# Patient Record
Sex: FEMALE | Race: WHITE | NOT HISPANIC OR LATINO | Employment: OTHER | URBAN - METROPOLITAN AREA
[De-identification: names, ages, dates, MRNs, and addresses within clinical notes are randomized per-mention and may not be internally consistent; named-entity substitution may affect disease eponyms.]

---

## 2017-01-12 ENCOUNTER — TRANSFERRED RECORDS (OUTPATIENT)
Dept: HEALTH INFORMATION MANAGEMENT | Facility: CLINIC | Age: 62
End: 2017-01-12

## 2017-02-28 ENCOUNTER — OFFICE VISIT (OUTPATIENT)
Dept: DERMATOLOGY | Facility: CLINIC | Age: 62
End: 2017-02-28

## 2017-02-28 VITALS — DIASTOLIC BLOOD PRESSURE: 79 MMHG | HEART RATE: 85 BPM | SYSTOLIC BLOOD PRESSURE: 138 MMHG

## 2017-02-28 DIAGNOSIS — L66.10 LICHEN PLANO-PILARIS: Primary | ICD-10-CM

## 2017-02-28 DIAGNOSIS — L30.9 DERMATITIS: ICD-10-CM

## 2017-02-28 DIAGNOSIS — L43.9 LICHEN PLANUS: ICD-10-CM

## 2017-02-28 DIAGNOSIS — L29.9 PRURITUS: ICD-10-CM

## 2017-02-28 LAB
ALBUMIN SERPL-MCNC: 3.7 G/DL (ref 3.4–5)
ALP SERPL-CCNC: 55 U/L (ref 40–150)
ALT SERPL W P-5'-P-CCNC: 28 U/L (ref 0–50)
ANION GAP SERPL CALCULATED.3IONS-SCNC: 6 MMOL/L (ref 3–14)
AST SERPL W P-5'-P-CCNC: 19 U/L (ref 0–45)
BASOPHILS # BLD AUTO: 0 10E9/L (ref 0–0.2)
BASOPHILS NFR BLD AUTO: 0.3 %
BILIRUB SERPL-MCNC: 0.2 MG/DL (ref 0.2–1.3)
BUN SERPL-MCNC: 13 MG/DL (ref 7–30)
CALCIUM SERPL-MCNC: 8.7 MG/DL (ref 8.5–10.1)
CHLORIDE SERPL-SCNC: 99 MMOL/L (ref 94–109)
CO2 SERPL-SCNC: 32 MMOL/L (ref 20–32)
CREAT SERPL-MCNC: 0.73 MG/DL (ref 0.52–1.04)
DIFFERENTIAL METHOD BLD: NORMAL
EOSINOPHIL # BLD AUTO: 0.1 10E9/L (ref 0–0.7)
EOSINOPHIL NFR BLD AUTO: 1 %
ERYTHROCYTE [DISTWIDTH] IN BLOOD BY AUTOMATED COUNT: 12.9 % (ref 10–15)
GFR SERPL CREATININE-BSD FRML MDRD: 82 ML/MIN/1.7M2
GLUCOSE SERPL-MCNC: 94 MG/DL (ref 70–99)
HCT VFR BLD AUTO: 39.7 % (ref 35–47)
HGB BLD-MCNC: 13.4 G/DL (ref 11.7–15.7)
IMM GRANULOCYTES # BLD: 0 10E9/L (ref 0–0.4)
IMM GRANULOCYTES NFR BLD: 0.1 %
LYMPHOCYTES # BLD AUTO: 2.3 10E9/L (ref 0.8–5.3)
LYMPHOCYTES NFR BLD AUTO: 33.6 %
MCH RBC QN AUTO: 32.1 PG (ref 26.5–33)
MCHC RBC AUTO-ENTMCNC: 33.8 G/DL (ref 31.5–36.5)
MCV RBC AUTO: 95 FL (ref 78–100)
MONOCYTES # BLD AUTO: 0.5 10E9/L (ref 0–1.3)
MONOCYTES NFR BLD AUTO: 6.8 %
NEUTROPHILS # BLD AUTO: 4 10E9/L (ref 1.6–8.3)
NEUTROPHILS NFR BLD AUTO: 58.2 %
NRBC # BLD AUTO: 0 10*3/UL
NRBC BLD AUTO-RTO: 0 /100
PLATELET # BLD AUTO: 265 10E9/L (ref 150–450)
POTASSIUM SERPL-SCNC: 3.8 MMOL/L (ref 3.4–5.3)
PROT SERPL-MCNC: 7 G/DL (ref 6.8–8.8)
RBC # BLD AUTO: 4.18 10E12/L (ref 3.8–5.2)
SODIUM SERPL-SCNC: 138 MMOL/L (ref 133–144)
WBC # BLD AUTO: 6.9 10E9/L (ref 4–11)

## 2017-02-28 RX ORDER — BETAMETHASONE DIPROPIONATE 0.5 MG/G
LOTION TOPICAL DAILY
COMMUNITY
End: 2017-09-18

## 2017-02-28 ASSESSMENT — PAIN SCALES - GENERAL: PAINLEVEL: NO PAIN (0)

## 2017-02-28 NOTE — PROGRESS NOTES
"[facility] Dermatology Note      Dermatology Problem List:  1. Lichen planopilaris    Encounter Date: Feb 28, 2017    CC:   Chief Complaint   Patient presents with     Derm Problem     Hair loss. First visit with Dr. Quinn.  Referred by Dr. Arreola at [facility].         History of Present Illness:  Ms. Chitra Monet is a 61 year old female who is being seen at the request of  Dr. Arreola who presents with a history of biopsy-demonstrated lichen planopilaris, previously treated with betamethasone solution and clobetasol solution, as well as ketoconazole shampoo. She is currently only using the betamethasone solution. She has a history of hypothyroidism on supplementation. Despite use of the betamethasone solution she has continued to have hair loss with scalp tingling and itch with marked generalized and perifollicular erythema. Although notes from the outside physician indicate that she was started on plaquenil at one point, she does not recall ever taking this, \"I don't know, I might have taken it, but if I did it didn't work.\" She endorses itch and tingling of her scalp with tenderness to palpation.     She went to an allergist/immunologist who was concerned that her scalp symptom were indicative of contact allergy. He did patch testing which revealed allergy to disperse blue dye 124/106. He then told her that she is probably allergic to PPD and should stop dying her hair. On further clarification it does not appear that she tested positive, but rather that he just presumed she was.     Past Medical History:   There is no problem list on file for this patient.    Past Medical History   Diagnosis Date     Constipation      CHRONIC     Cough      CHRONIC     Migraine, unspecified, without mention of intractable migraine without mention of status migrainosus      Unspecified hypothyroidism      Past Surgical History   Procedure Laterality Date     Biopsy       BREAST     Ent surgery  "      TONSILLECTOMY     Gyn surgery       LAPAROSCOPY     Cosmetic surgery       bleph 2 weeks ago     Colonoscopy  6/6/2013     Procedure: COLONOSCOPY;  COLONOSCOPY;  Surgeon: Caridad Young MD;  Location: [facility]       Social History:  The patient works.     Family History:  No family history of hair loss.     Medications:  Current Outpatient Prescriptions   Medication Sig Dispense Refill     Thyroid (NATURE-THROID) 81.25 MG TABS Take by mouth daily       BuPROPion HCl (WELLBUTRIN PO) Take 50 mg by mouth daily       BIOTIN PO        betamethasone dipropionate (DIPROSONE) 0.05 % lotion Apply topically daily       albuterol (ALBUTEROL) 108 (90 BASE) MCG/ACT inhaler Inhale 2 puffs into the lungs as needed. Patient uses 4 days per week.       SUMAtriptan Succinate (IMITREX PO) Take 100 mg by mouth as needed.       Multiple Vitamin (MULTIVITAMINS PO) Take  by mouth daily.       Levothyroxine Sodium (SYNTHROID PO) Take 50 mcg by mouth daily Reported on 2/28/2017       Liothyronine Sodium (CYTOMEL PO) Take 5 mcg by mouth daily Reported on 2/28/2017       Benzonatate (TESSALON PO) Take 200 mg by mouth 3 times daily Reported on 2/28/2017       Polyethylene Glycol 3350 (MIRALAX PO) Reported on 2/28/2017          No Known Allergies      Review of Systems:  -As per HPI  -Constitutional: The patient denies fatigue, fevers, chills, unintended weight loss, and night sweats.  -HEENT: Patient denies nonhealing oral sores.  -Skin: As above in HPI. No additional skin concerns.    Physical exam:  Vitals: /79 (BP Location: Right arm, Patient Position: Chair, Cuff Size: Adult Regular)  Pulse 85  GEN: This is a well developed, well-nourished female in no acute distress, in a pleasant mood.    SKIN: Focused examination of the face and scalp were performed.   -LPPAI score 1.33 (pruritus, pain, erythema, perifollicular erythema)  -Diffuse thinning of the hair with patches of loss particularly on the back of the scalp with marked  thinning. There is scalp erythema diffusely and in a perifollicular distribution with minimal perifollicular scale. The scalp is mildly tender to palpation.   -No other lesions of concern on areas examined.     Impression/Plan:  1. Lichen planopilaris. Ms. Monet has lichen planopilaris recalcitrant to topical steroids. At this point it would be appropriate to consider plaquenil as a treatment option. We may consider having her go for more detailed patch testing depending on the results of her prior patch testing.     Labs: CBC, CMP    Ophthalmology records to be requested by patient    Patch testing records to be requested by patient    Betamethasone solution 5 days of the week    Ketoconazole shampoo 3 times a week      CC . Dr. Ciara Arreola on close of this encounter.    Follow-up 1 month.        Dr. Quinn staffed the patient.    Staff Involved:  Resident(Gina Richey)/Staff(as above)      Patient was seen and examined with the medicine/dermatology resident. I agree with the history, review of systems, physical examination, assessments and plan.    Rossana Quinn MD  Professor and  Chair  Department of Dermatology  [facility]

## 2017-02-28 NOTE — NURSING NOTE
Dermatology Rooming Note    Chitra Monet's goals for this visit include:   Chief Complaint   Patient presents with     Derm Problem     Hair loss. First visit with Dr. Quinn.  Referred by Dr. Arreola at [facility].       Char Rosenbaum LPN

## 2017-02-28 NOTE — PATIENT INSTRUCTIONS
Call the ophthalmology clinic to ask if you are okay to start plaquenil. Then have them fax the results to this clinic.     Please have your patch test results sent to this clinic.     Try to patch test the hairdye for allergy. Try behind the ear or on the forearm--if you pass that means that your skin can tolerate it. Use the steroid solution before and after coloring. But more important will be to get the records.     Use the betamethasone 5 of 7 days of the week to help decrease thinning of the skin  Use the ketoconazole shampoo 3 times a week

## 2017-02-28 NOTE — LETTER
"2/28/2017       RE: Chitra Monet  [address]  [address]  [facility] [address]     Dear Colleague,    Thank you for referring your patient, Chitra Monet, to the [facility] DERMATOLOGY at [facility]. Please see a copy of my visit note below.                  Pictures taken of patient today to be placed in chart for future reference.        [facility] Dermatology Note      Dermatology Problem List:  1. Lichen planopilaris    Encounter Date: Feb 28, 2017    CC:   Chief Complaint   Patient presents with     Derm Problem     Hair loss. First visit with Dr. Quinn.  Referred by Dr. Arreola at [facility].         History of Present Illness:  Ms. Chitra Monet is a 61 year old female who is being seen at the request of  Dr. Arreola who presents with a history of biopsy-demonstrated lichen planopilaris, previously treated with betamethasone solution and clobetasol solution, as well as ketoconazole shampoo. She is currently only using the betamethasone solution. She has a history of hypothyroidism on supplementation. Despite use of the betamethasone solution she has continued to have hair loss with scalp tingling and itch with marked generalized and perifollicular erythema. Although notes from the outside physician indicate that she was started on plaquenil at one point, she does not recall ever taking this, \"I don't know, I might have taken it, but if I did it didn't work.\" She endorses itch and tingling of her scalp with tenderness to palpation.     She went to an allergist/immunologist who was concerned that her scalp symptom were indicative of contact allergy. He did patch testing which revealed allergy to disperse blue dye 124/106. He then told her that she is probably allergic to PPD and should stop dying her hair. On further clarification it does not appear that she tested positive, but rather that he just presumed she was.     Past " Medical History:   There is no problem list on file for this patient.    Past Medical History   Diagnosis Date     Constipation      CHRONIC     Cough      CHRONIC     Migraine, unspecified, without mention of intractable migraine without mention of status migrainosus      Unspecified hypothyroidism      Past Surgical History   Procedure Laterality Date     Biopsy       BREAST     Ent surgery       TONSILLECTOMY     Gyn surgery       LAPAROSCOPY     Cosmetic surgery       bleph 2 weeks ago     Colonoscopy  6/6/2013     Procedure: COLONOSCOPY;  COLONOSCOPY;  Surgeon: Caridad Young MD;  Location: [facility]       Social History:  The patient works.     Family History:  No family history of hair loss.     Medications:  Current Outpatient Prescriptions   Medication Sig Dispense Refill     Thyroid (NATURE-THROID) 81.25 MG TABS Take by mouth daily       BuPROPion HCl (WELLBUTRIN PO) Take 50 mg by mouth daily       BIOTIN PO        betamethasone dipropionate (DIPROSONE) 0.05 % lotion Apply topically daily       albuterol (ALBUTEROL) 108 (90 BASE) MCG/ACT inhaler Inhale 2 puffs into the lungs as needed. Patient uses 4 days per week.       SUMAtriptan Succinate (IMITREX PO) Take 100 mg by mouth as needed.       Multiple Vitamin (MULTIVITAMINS PO) Take  by mouth daily.       Levothyroxine Sodium (SYNTHROID PO) Take 50 mcg by mouth daily Reported on 2/28/2017       Liothyronine Sodium (CYTOMEL PO) Take 5 mcg by mouth daily Reported on 2/28/2017       Benzonatate (TESSALON PO) Take 200 mg by mouth 3 times daily Reported on 2/28/2017       Polyethylene Glycol 3350 (MIRALAX PO) Reported on 2/28/2017          No Known Allergies      Review of Systems:  -As per HPI  -Constitutional: The patient denies fatigue, fevers, chills, unintended weight loss, and night sweats.  -HEENT: Patient denies nonhealing oral sores.  -Skin: As above in HPI. No additional skin concerns.    Physical exam:  Vitals: /79 (BP Location: Right arm,  Patient Position: Chair, Cuff Size: Adult Regular)  Pulse 85  GEN: This is a well developed, well-nourished female in no acute distress, in a pleasant mood.    SKIN: Focused examination of the face and scalp were performed.   -LPPAI score 1.33 (pruritus, pain, erythema, perifollicular erythema)  -Diffuse thinning of the hair with patches of loss particularly on the back of the scalp with marked thinning. There is scalp erythema diffusely and in a perifollicular distribution with minimal perifollicular scale. The scalp is mildly tender to palpation.   -No other lesions of concern on areas examined.     Impression/Plan:  1. Lichen planopilaris. Ms. Monet has lichen planopilaris recalcitrant to topical steroids. At this point it would be appropriate to consider plaquenil as a treatment option. We may consider having her go for more detailed patch testing depending on the results of her prior patch testing.     Labs: CBC, CMP    Ophthalmology records to be requested by patient    Patch testing records to be requested by patient    Betamethasone solution 5 days of the week    Ketoconazole shampoo 3 times a week      CC Dr. Dr. Ciara Arreola on close of this encounter.    Follow-up 1 month.        Dr. Quinn staffed the patient.    Staff Involved:  Resident(Gina Richey)/Staff(as above)      Patient was seen and examined with the medicine/dermatology resident. I agree with the history, review of systems, physical examination, assessments and plan.    Rossana Quinn MD  Professor and  Chair  Department of Dermatology  [facility]    Again, thank you for allowing me to participate in the care of your patient.      Sincerely,    Rossana Quinn MD

## 2017-02-28 NOTE — MR AVS SNAPSHOT
After Visit Summary   2/28/2017    Chitra Monet    MRN: [id number]           Patient Information     Date Of Birth          1955        Visit Information        Provider Department      2/28/2017 4:15 PM Rossana Quinn MD [facility] Dermatology        Today's Diagnoses     Lichen planus    -  1      Care Instructions    Call the ophthalmology clinic to ask if you are okay to start plaquenil. Then have them fax the results to this clinic.     Please have your patch test results sent to this clinic.     Try to patch test the hairdye for allergy. Try behind the ear or on the forearm--if you pass that means that your skin can tolerate it. Use the steroid solution before and after coloring. But more important will be to get the records.     Use the betamethasone 5 of 7 days of the week to help decrease thinning of the skin  Use the ketoconazole shampoo 3 times a week        Follow-ups after your visit        Follow-up notes from your care team     Return in about 4 weeks (around 3/28/2017).      Future tests that were ordered for you today     Open Future Orders        Priority Expected Expires Ordered    CBC with platelets differential Routine  2/28/2018 2/28/2017    Comprehensive metabolic panel Routine  2/28/2018 2/28/2017            Who to contact     Please call your clinic at [phone] to:    Ask questions about your health    Make or cancel appointments    Discuss your medicines    Learn about your test results    Speak to your doctor   If you have compliments or concerns about an experience at your clinic, or if you wish to file a complaint, please contact [facility] Patient Relations at [phone] or email us at [email or web address]         Additional Information About Your Visit        Care EveryWhere ID     This is your Care EveryWhere ID. This could be used by other organizations to access your Pep medical records  [id number]         Your Vitals Were     Pulse                   85            Blood Pressure from Last 3 Encounters:   02/28/17 138/79   06/06/13 129/82    Weight from Last 3 Encounters:   06/06/13 71.7 kg (158 lb)               Primary Care Provider Office Phone # Fax #    Amy Garcia [phone]       [employer] [facility] [address]  [facility] [address]        Thank you!     Thank you for choosing [facility] DERMATOLOGY  for your care. Our goal is always to provide you with excellent care. Hearing back from our patients is one way we can continue to improve our services. Please take a few minutes to complete the written survey that you may receive in the mail after your visit with us. Thank you!             Your Updated Medication List - Protect others around you: Learn how to safely use, store and throw away your medicines at www.disposemymeds.org.          This list is accurate as of: 2/28/17  5:19 PM.  Always use your most recent med list.                   Brand Name Dispense Instructions for use    albuterol 108 (90 BASE) MCG/ACT Inhaler   Generic drug:  albuterol      Inhale 2 puffs into the lungs as needed. Patient uses 4 days per week.       betamethasone dipropionate 0.05 % lotion    DIPROSONE     Apply topically daily       BIOTIN PO          CYTOMEL PO      Take 5 mcg by mouth daily Reported on 2/28/2017       IMITREX PO      Take 100 mg by mouth as needed.       MIRALAX PO      Reported on 2/28/2017       MULTIVITAMINS PO      Take  by mouth daily.       NATURE-THROID 81.25 MG Tabs   Generic drug:  Thyroid      Take by mouth daily       SYNTHROID PO      Take 50 mcg by mouth daily Reported on 2/28/2017       TESSALON PO      Take 200 mg by mouth 3 times daily Reported on 2/28/2017       WELLBUTRIN PO      Take 50 mg by mouth daily

## 2017-02-28 NOTE — LETTER
"2/28/2017      RE: Chitra Monet  [address]  [address]  [place] [facility] [address]                     Pictures taken of patient today to be placed in chart for future reference.        [facility] Dermatology Note      Dermatology Problem List:  1. Lichen planopilaris    Encounter Date: Feb 28, 2017    CC:   Chief Complaint   Patient presents with     Derm Problem     Hair loss. First visit with Dr. Quinn.  Referred by Dr. Arreola at [facility].         History of Present Illness:  Ms. Chitra Monet is a 61 year old female who is being seen at the request of  Dr. Arreola who presents with a history of biopsy-demonstrated lichen planopilaris, previously treated with betamethasone solution and clobetasol solution, as well as ketoconazole shampoo. She is currently only using the betamethasone solution. She has a history of hypothyroidism on supplementation. Despite use of the betamethasone solution she has continued to have hair loss with scalp tingling and itch with marked generalized and perifollicular erythema. Although notes from the outside physician indicate that she was started on plaquenil at one point, she does not recall ever taking this, \"I don't know, I might have taken it, but if I did it didn't work.\" She endorses itch and tingling of her scalp with tenderness to palpation.     She went to an allergist/immunologist who was concerned that her scalp symptom were indicative of contact allergy. He did patch testing which revealed allergy to disperse blue dye 124/106. He then told her that she is probably allergic to PPD and should stop dying her hair. On further clarification it does not appear that she tested positive, but rather that he just presumed she was.     Past Medical History:   There is no problem list on file for this patient.    Past Medical History   Diagnosis Date     Constipation      CHRONIC     Cough      CHRONIC     Migraine, unspecified, without " mention of intractable migraine without mention of status migrainosus      Unspecified hypothyroidism      Past Surgical History   Procedure Laterality Date     Biopsy       BREAST     Ent surgery       TONSILLECTOMY     Gyn surgery       LAPAROSCOPY     Cosmetic surgery       bleph 2 weeks ago     Colonoscopy  6/6/2013     Procedure: COLONOSCOPY;  COLONOSCOPY;  Surgeon: Caridad Young MD;  Location: [facility]       Social History:  The patient works.     Family History:  No family history of hair loss.     Medications:  Current Outpatient Prescriptions   Medication Sig Dispense Refill     Thyroid (NATURE-THROID) 81.25 MG TABS Take by mouth daily       BuPROPion HCl (WELLBUTRIN PO) Take 50 mg by mouth daily       BIOTIN PO        betamethasone dipropionate (DIPROSONE) 0.05 % lotion Apply topically daily       albuterol (ALBUTEROL) 108 (90 BASE) MCG/ACT inhaler Inhale 2 puffs into the lungs as needed. Patient uses 4 days per week.       SUMAtriptan Succinate (IMITREX PO) Take 100 mg by mouth as needed.       Multiple Vitamin (MULTIVITAMINS PO) Take  by mouth daily.       Levothyroxine Sodium (SYNTHROID PO) Take 50 mcg by mouth daily Reported on 2/28/2017       Liothyronine Sodium (CYTOMEL PO) Take 5 mcg by mouth daily Reported on 2/28/2017       Benzonatate (TESSALON PO) Take 200 mg by mouth 3 times daily Reported on 2/28/2017       Polyethylene Glycol 3350 (MIRALAX PO) Reported on 2/28/2017          No Known Allergies      Review of Systems:  -As per HPI  -Constitutional: The patient denies fatigue, fevers, chills, unintended weight loss, and night sweats.  -HEENT: Patient denies nonhealing oral sores.  -Skin: As above in HPI. No additional skin concerns.    Physical exam:  Vitals: /79 (BP Location: Right arm, Patient Position: Chair, Cuff Size: Adult Regular)  Pulse 85  GEN: This is a well developed, well-nourished female in no acute distress, in a pleasant mood.    SKIN: Focused examination of the face and  scalp were performed.   -LPPAI score 1.33 (pruritus, pain, erythema, perifollicular erythema)  -Diffuse thinning of the hair with patches of loss particularly on the back of the scalp with marked thinning. There is scalp erythema diffusely and in a perifollicular distribution with minimal perifollicular scale. The scalp is mildly tender to palpation.   -No other lesions of concern on areas examined.     Impression/Plan:  1. Lichen planopilaris. Ms. Monet has lichen planopilaris recalcitrant to topical steroids. At this point it would be appropriate to consider plaquenil as a treatment option. We may consider having her go for more detailed patch testing depending on the results of her prior patch testing.     Labs: CBC, CMP    Ophthalmology records to be requested by patient    Patch testing records to be requested by patient    Betamethasone solution 5 days of the week    Ketoconazole shampoo 3 times a week      CC Dr. Dr. Ciara Arreola on close of this encounter.    Follow-up 1 month.        Dr. Quinn staffed the patient.    Staff Involved:  Resident(Gina Richey)/Staff(as above)      Patient was seen and examined with the medicine/dermatology resident. I agree with the history, review of systems, physical examination, assessments and plan.    Rossana Quinn MD  Professor and  Chair  Department of Dermatology  [facility]

## 2017-03-10 ENCOUNTER — TELEPHONE (OUTPATIENT)
Dept: DERMATOLOGY | Facility: CLINIC | Age: 62
End: 2017-03-10

## 2017-03-27 ENCOUNTER — TRANSFERRED RECORDS (OUTPATIENT)
Dept: HEALTH INFORMATION MANAGEMENT | Facility: CLINIC | Age: 62
End: 2017-03-27

## 2017-03-30 ENCOUNTER — TELEPHONE (OUTPATIENT)
Dept: DERMATOLOGY | Facility: CLINIC | Age: 62
End: 2017-03-30

## 2017-04-02 PROBLEM — L66.10 LICHEN PLANO-PILARIS: Status: ACTIVE | Noted: 2017-04-02

## 2017-04-02 PROBLEM — L30.9 DERMATITIS: Status: ACTIVE | Noted: 2017-04-02

## 2017-04-02 PROBLEM — L29.9 PRURITUS: Status: ACTIVE | Noted: 2017-04-02

## 2017-04-21 ENCOUNTER — TELEPHONE (OUTPATIENT)
Dept: DERMATOLOGY | Facility: CLINIC | Age: 62
End: 2017-04-21

## 2017-04-21 DIAGNOSIS — L30.9 DERMATITIS: ICD-10-CM

## 2017-04-21 DIAGNOSIS — L66.10 LICHEN PLANO-PILARIS: Primary | ICD-10-CM

## 2017-04-21 RX ORDER — HYDROXYCHLOROQUINE SULFATE 200 MG/1
200 TABLET, FILM COATED ORAL 2 TIMES DAILY
Qty: 60 TABLET | Refills: 1 | Status: SHIPPED | OUTPATIENT
Start: 2017-04-21 | End: 2017-06-25

## 2017-04-21 NOTE — TELEPHONE ENCOUNTER
Spoke to Ms. Monet this afternoon - she is ready to start plaquenil. The following plan was initiated:    Bethamethasone solution at night and shampooing with 2% ketoconazole shampoo - do twice a week at a minimum  Initiate plaquenil 200 mg daily for one week and if tolerated increase to one tablet twice a day  Cancel May appointment and schedule a new appointment at the end of June - request routed to clinic manager and staff  Will have safety labs at that time.    Rossana Quinn MD

## 2017-06-19 ENCOUNTER — OFFICE VISIT (OUTPATIENT)
Dept: DERMATOLOGY | Facility: CLINIC | Age: 62
End: 2017-06-19

## 2017-06-19 VITALS — DIASTOLIC BLOOD PRESSURE: 70 MMHG | HEART RATE: 84 BPM | SYSTOLIC BLOOD PRESSURE: 116 MMHG

## 2017-06-19 DIAGNOSIS — L66.10 LICHEN PLANO-PILARIS: Primary | ICD-10-CM

## 2017-06-19 DIAGNOSIS — L66.10 LICHEN PLANO-PILARIS: ICD-10-CM

## 2017-06-19 DIAGNOSIS — L30.9 DERMATITIS: ICD-10-CM

## 2017-06-19 DIAGNOSIS — L29.9 PRURITUS: ICD-10-CM

## 2017-06-19 LAB
ALBUMIN SERPL-MCNC: 3.8 G/DL (ref 3.4–5)
ALP SERPL-CCNC: 69 U/L (ref 40–150)
ALT SERPL W P-5'-P-CCNC: 28 U/L (ref 0–50)
AST SERPL W P-5'-P-CCNC: 24 U/L (ref 0–45)
BASOPHILS # BLD AUTO: 0 10E9/L (ref 0–0.2)
BASOPHILS NFR BLD AUTO: 0.5 %
BILIRUB DIRECT SERPL-MCNC: 0.1 MG/DL (ref 0–0.2)
BILIRUB SERPL-MCNC: 0.3 MG/DL (ref 0.2–1.3)
DIFFERENTIAL METHOD BLD: NORMAL
EOSINOPHIL # BLD AUTO: 0.1 10E9/L (ref 0–0.7)
EOSINOPHIL NFR BLD AUTO: 1.2 %
ERYTHROCYTE [DISTWIDTH] IN BLOOD BY AUTOMATED COUNT: 12.6 % (ref 10–15)
HCT VFR BLD AUTO: 39.7 % (ref 35–47)
HGB BLD-MCNC: 13.3 G/DL (ref 11.7–15.7)
IMM GRANULOCYTES # BLD: 0 10E9/L (ref 0–0.4)
IMM GRANULOCYTES NFR BLD: 0.3 %
LYMPHOCYTES # BLD AUTO: 1.7 10E9/L (ref 0.8–5.3)
LYMPHOCYTES NFR BLD AUTO: 26.6 %
MCH RBC QN AUTO: 32 PG (ref 26.5–33)
MCHC RBC AUTO-ENTMCNC: 33.5 G/DL (ref 31.5–36.5)
MCV RBC AUTO: 95 FL (ref 78–100)
MONOCYTES # BLD AUTO: 0.5 10E9/L (ref 0–1.3)
MONOCYTES NFR BLD AUTO: 7.5 %
NEUTROPHILS # BLD AUTO: 4.2 10E9/L (ref 1.6–8.3)
NEUTROPHILS NFR BLD AUTO: 63.9 %
NRBC # BLD AUTO: 0 10*3/UL
NRBC BLD AUTO-RTO: 0 /100
PLATELET # BLD AUTO: 248 10E9/L (ref 150–450)
PROT SERPL-MCNC: 7.2 G/DL (ref 6.8–8.8)
RBC # BLD AUTO: 4.16 10E12/L (ref 3.8–5.2)
WBC # BLD AUTO: 6.6 10E9/L (ref 4–11)

## 2017-06-19 ASSESSMENT — PAIN SCALES - GENERAL: PAINLEVEL: NO PAIN (0)

## 2017-06-19 NOTE — LETTER
"6/19/2017       RE: Chitra Monet  [address]  [address]  [facility] [address]     Dear Colleague,    Thank you for referring your patient, Chitra Monet, to the [facility] DERMATOLOGY at [facility]. Please see a copy of my visit note below.    [facility] Dermatology Note      Dermatology Problem List:  1. Lichen planopilaris    Encounter Date: Jun 19, 2017    CC:  Chief Complaint   Patient presents with     Hair/Scalp Problem     Lichen planopilaris - \"I don't see much of a change.\"         History of Present Illness:  Ms. Chitra Monet is a 62 year old female who presents today as follow up for Lichen planopilaris. She was last seen 2/28/2017, at which time she was evaluated to start Plaquenil. On April 23rd she started Plaquenil 200 mg daily for one week, tolerated it well, and increased to one tablet twice a day. She sometimes feels dizzy/lightheaded when she stands up. Additionally she is using betamethasone solution at night 2-3 times a week and 2% ketoconazole shampoo twice a week. She feels like the inflammation is just is bad and is not sure about the hair growth/loss.     She has burning, pruritus, pain, and redness, which is worse at night. Betamethasone does seem to help with the pruritus.     Past Medical History:   Patient Active Problem List   Diagnosis     Lichen plano-pilaris     Dermatitis     Pruritus     Past Medical History:   Diagnosis Date     Constipation     CHRONIC     Cough     CHRONIC     Migraine, unspecified, without mention of intractable migraine without mention of status migrainosus      Unspecified hypothyroidism      Past Surgical History:   Procedure Laterality Date     BIOPSY      BREAST     COLONOSCOPY  6/6/2013    Procedure: COLONOSCOPY;  COLONOSCOPY;  Surgeon: Caridad Young MD;  Location: [facility]     COSMETIC SURGERY      bleph 2 weeks ago     ENT SURGERY      TONSILLECTOMY     GYN SURGERY      " LAPAROSCOPY       Social History:  The patient works.    Family History:  There is no family history of hair loss.     Medications:  Current Outpatient Prescriptions   Medication Sig Dispense Refill     hydroxychloroquine (PLAQUENIL) 200 MG tablet Take 1 tablet (200 mg) by mouth 2 times daily Take one tablet daily for one week, and if tolerated increase to twice a day 60 tablet 1     Thyroid (NATURE-THROID) 81.25 MG TABS Take by mouth daily       BuPROPion HCl (WELLBUTRIN PO) Take 50 mg by mouth daily       BIOTIN PO        betamethasone dipropionate (DIPROSONE) 0.05 % lotion Apply topically daily       Levothyroxine Sodium (SYNTHROID PO) Take 50 mcg by mouth daily Reported on 2/28/2017       Liothyronine Sodium (CYTOMEL PO) Take 5 mcg by mouth daily Reported on 2/28/2017       albuterol (ALBUTEROL) 108 (90 BASE) MCG/ACT inhaler Inhale 2 puffs into the lungs as needed. Patient uses 4 days per week.       Benzonatate (TESSALON PO) Take 200 mg by mouth 3 times daily Reported on 2/28/2017       Polyethylene Glycol 3350 (MIRALAX PO) Reported on 2/28/2017       SUMAtriptan Succinate (IMITREX PO) Take 100 mg by mouth as needed.       Multiple Vitamin (MULTIVITAMINS PO) Take  by mouth daily.       No Known Allergies      Review of Systems:  -Constitutional: The patient denies fatigue, fevers, chills, unintended weight loss, and night sweats.  -Skin: As above in HPI. No additional skin concerns.    Physical exam:  Vitals: /70  Pulse 84  GEN: This is a well developed, well-nourished female in no acute distress, in a pleasant mood.    SKIN: Focused examination of the scalp was performed.  -Decreased hair density on parietal and vertex scalp, stable hair density on temporal scalp, increased hair density on frontal hairline.   -Erythema of frontotemporal hairline and temporal scalp.   -Perifollicular erythema on parietal scalp.   -No other lesions of concern on areas examined.     Impression/Plan:  1. Lichen  planopilaris, associated with possible associated irritant or contact dermatitis    Discussed the importance of decreasing the topical steroids due to scalp atrophy.     Will start 6% topical gabapentin topical today to address scalp symptoms     Continue Plaquenil 200 mg one tablet twice a day.     Safety labs today.      Referred for patch testing.     Follow-up 8 weeks.     Staff Involved:  Scribed by Bill Greco, MS3 for Dr. Quinn.      I agree with the PFSH and ROS as completed by the Medical Student. The remainder of the encounter was performed by me and scribed by the Medical Student. The scribed note accurately reflects my personal services and the medical decisions made by me.      Rossana Quinn MD  Professor and  Chair  Department of Dermatology  [facility]    Pictures were placed in Pt's chart today for future reference.              Again, thank you for allowing me to participate in the care of your patient.      Sincerely,    Rossana Quinn MD

## 2017-06-19 NOTE — PROGRESS NOTES
"[facility] Dermatology Note      Dermatology Problem List:  1. Lichen planopilaris    Encounter Date: Jun 19, 2017    CC:  Chief Complaint   Patient presents with     Hair/Scalp Problem     Lichen planopilaris - \"I don't see much of a change.\"         History of Present Illness:  Ms. Chitra Monet is a 62 year old female who presents today as follow up for Lichen planopilaris. She was last seen 2/28/2017, at which time she was evaluated to start Plaquenil. On April 23rd she started Plaquenil 200 mg daily for one week, tolerated it well, and increased to one tablet twice a day. She sometimes feels dizzy/lightheaded when she stands up. Additionally she is using betamethasone solution at night 2-3 times a week and 2% ketoconazole shampoo twice a week. She feels like the inflammation is just is bad and is not sure about the hair growth/loss.     She has burning, pruritus, pain, and redness, which is worse at night. Betamethasone does seem to help with the pruritus.     Past Medical History:   Patient Active Problem List   Diagnosis     Lichen plano-pilaris     Dermatitis     Pruritus     Past Medical History:   Diagnosis Date     Constipation     CHRONIC     Cough     CHRONIC     Migraine, unspecified, without mention of intractable migraine without mention of status migrainosus      Unspecified hypothyroidism      Past Surgical History:   Procedure Laterality Date     BIOPSY      BREAST     COLONOSCOPY  6/6/2013    Procedure: COLONOSCOPY;  COLONOSCOPY;  Surgeon: Caridad Young MD;  Location: [facility]     COSMETIC SURGERY      bleph 2 weeks ago     ENT SURGERY      TONSILLECTOMY     GYN SURGERY      LAPAROSCOPY       Social History:  The patient works.    Family History:  There is no family history of hair loss.     Medications:  Current Outpatient Prescriptions   Medication Sig Dispense Refill     hydroxychloroquine (PLAQUENIL) 200 MG tablet Take 1 tablet (200 mg) by mouth 2 times daily Take one " tablet daily for one week, and if tolerated increase to twice a day 60 tablet 1     Thyroid (NATURE-THROID) 81.25 MG TABS Take by mouth daily       BuPROPion HCl (WELLBUTRIN PO) Take 50 mg by mouth daily       BIOTIN PO        betamethasone dipropionate (DIPROSONE) 0.05 % lotion Apply topically daily       Levothyroxine Sodium (SYNTHROID PO) Take 50 mcg by mouth daily Reported on 2/28/2017       Liothyronine Sodium (CYTOMEL PO) Take 5 mcg by mouth daily Reported on 2/28/2017       albuterol (ALBUTEROL) 108 (90 BASE) MCG/ACT inhaler Inhale 2 puffs into the lungs as needed. Patient uses 4 days per week.       Benzonatate (TESSALON PO) Take 200 mg by mouth 3 times daily Reported on 2/28/2017       Polyethylene Glycol 3350 (MIRALAX PO) Reported on 2/28/2017       SUMAtriptan Succinate (IMITREX PO) Take 100 mg by mouth as needed.       Multiple Vitamin (MULTIVITAMINS PO) Take  by mouth daily.       No Known Allergies      Review of Systems:  -Constitutional: The patient denies fatigue, fevers, chills, unintended weight loss, and night sweats.  -Skin: As above in HPI. No additional skin concerns.    Physical exam:  Vitals: /70  Pulse 84  GEN: This is a well developed, well-nourished female in no acute distress, in a pleasant mood.    SKIN: Focused examination of the scalp was performed.  -Decreased hair density on parietal and vertex scalp, stable hair density on temporal scalp, increased hair density on frontal hairline.   -Erythema of frontotemporal hairline and temporal scalp.   -Perifollicular erythema on parietal scalp.   -No other lesions of concern on areas examined.     Impression/Plan:  1. Lichen planopilaris, associated with possible associated irritant or contact dermatitis    Discussed the importance of decreasing the topical steroids due to scalp atrophy.     Will start 6% topical gabapentin topical today to address scalp symptoms     Continue Plaquenil 200 mg one tablet twice a day.     Safety labs  today.      Referred for patch testing.     Follow-up 8 weeks.     Staff Involved:  Scribed by Bill Greco, MS3 for Dr. Quinn.      I agree with the PFSH and ROS as completed by the Medical Student. The remainder of the encounter was performed by me and scribed by the Medical Student. The scribed note accurately reflects my personal services and the medical decisions made by me.      Rossana Quinn MD  Professor and  Chair  Department of Dermatology  [facility]

## 2017-06-19 NOTE — PATIENT INSTRUCTIONS
Will start topical gabapentin.   - Drop 2 cc onto the part once a day    Continue the Plaquenil    Will refer for patch testing to test against 120 chemicals.     Try shampooing every other day in the summer when outside a lot or with increased activity.

## 2017-06-19 NOTE — NURSING NOTE
"Dermatology Rooming Note    Chitra Monet's goals for this visit include:   Chief Complaint   Patient presents with     Hair/Scalp Problem     Lichen planopilaris - \"I don't see much of a change.\"     Keri Delgadillo, [facility]  "

## 2017-06-19 NOTE — MR AVS SNAPSHOT
"              After Visit Summary   6/19/2017    Chitra Monet    MRN: [id number]           Patient Information     Date Of Birth          1955        Visit Information        Provider Department      6/19/2017 2:05 PM Rossana Quinn MD [facility] Dermatology        Today's Diagnoses     Lichen plano-pilaris    -  1    Dermatitis          Care Instructions    Will start topical gabapentin.   - Drop 2 cc onto the part once a day    Continue the Plaquenil    Will refer for patch testing to test against 120 chemicals.     Try shampooing every other day in the summer when outside a lot or with increased activity.           Follow-ups after your visit        Additional Services     [facility] Patch Test Referral       [facility]  [address], [address], [facility] [address]    Nidia Zamudio M.D., M.S.  Vicky De Anda M.D.    Christen \"Necy\" Katie [facility] Coordinator  [phone]    You are being referred to the [facility] for Patch Testing.  They should be calling you within 10 days.  If you have NOT heard from them after 10 days, PLEASE CALL THEM at the number listed above.  If the phone is not answered \"live\", please leave a message with your name and contact information and Stephanie will call you back.    Thank you,  [facility] Staff                  Follow-up notes from your care team     Return in about 2 months (around 8/19/2017).      Your next 10 appointments already scheduled     Jun 19, 2017  3:45 PM CDT   LAB with  LAB   [facility] ([facility])    [address] [address] [phone]           Patient must bring picture ID.  Patient should be prepared to give a urine specimen  Please do not eat 10-12 hours before your appointment if you are coming in fasting for labs on lipids, cholesterol, or glucose (sugar).  Pregnant women should follow their Care Team " instructions. Water with medications is okay. Do not drink coffee or other fluids.   If you have concerns about taking  your medications, please ask at office or if scheduling via [employer], send a message by clicking on Secure Messaging, Message Your Care Team.              Future tests that were ordered for you today     Open Future Orders        Priority Expected Expires Ordered    CBC with platelets differential Routine  6/19/2018 6/19/2017    Hepatic panel Routine  6/19/2018 6/19/2017            Who to contact     Please call your clinic at [phone] to:    Ask questions about your health    Make or cancel appointments    Discuss your medicines    Learn about your test results    Speak to your doctor   If you have compliments or concerns about an experience at your clinic, or if you wish to file a complaint, please contact [facility] Patient Relations at [phone] or email us at [email or web address]         Additional Information About Your Visit        [employer] Information     [employer] gives you secure access to your electronic health record. If you see a primary care provider, you can also send messages to your care team and make appointments. If you have questions, please call your primary care clinic.  If you do not have a primary care provider, please call [phone] and they will assist you.      [employer] is an electronic gateway that provides easy, online access to your medical records. With [employer], you can request a clinic appointment, read your test results, renew a prescription or communicate with your care team.     To access your existing account, please contact your [facility] or call [phone] for assistance.        Care EveryWhere ID     This is your Care EveryWhere ID. This could be used by other organizations to access your [place] medical records  [id number]        Your Vitals Were     Pulse                   84             Blood Pressure from Last 3 Encounters:   06/19/17 116/70   02/28/17 138/79   06/06/13 129/82    Weight from Last 3 Encounters:   06/06/13 71.7 kg (158 lb)              We Performed the Following     [facility] Patch Test Referral          Today's Medication Changes          These changes are accurate as of: 6/19/17  3:37 PM.  If you have any questions, ask your nurse or doctor.               Start taking these medicines.        Dose/Directions    gabapentin 6 % Soln solution   Used for:  Lichen plano-pilaris, Dermatitis   Started by:  Rossana Quinn MD        Apply 2 cc to scalp at night   Quantity:  60 mL   Refills:  3         Stop taking these medicines if you haven't already. Please contact your care team if you have questions.     CYTOMEL PO   Stopped by:  Rossana Quinn MD           MIRALAX PO   Stopped by:  Rossana Quinn MD           SYNTHROID PO   Stopped by:  Rossana Quinn MD           TESSALON PO   Stopped by:  Rossana Quinn MD                Where to get your medicines      These medications were sent to [facility] - [place], [place] - [address]  [address] [address]     Phone:  [phone]     gabapentin 6 % Soln solution                Primary Care Provider Office Phone # Fax #    Amy Garcia [phone]       [facility] [address]  [facility] [address]        Thank you!     Thank you for choosing [facility] DERMATOLOGY  for your care. Our goal is always to provide you with excellent care. Hearing back from our patients is one way we can continue to improve our services. Please take a few minutes to complete the written survey that you may receive in the mail after your visit with us. Thank you!             Your Updated Medication List - Protect others around you: Learn how to safely use, store and throw away your medicines at  www.disposemymeds.org.          This list is accurate as of: 6/19/17  3:37 PM.  Always use your most recent med list.                   Brand Name Dispense Instructions for use    albuterol 108 (90 BASE) MCG/ACT Inhaler   Generic drug:  albuterol      Inhale 2 puffs into the lungs as needed. Patient uses 4 days per week.       betamethasone dipropionate 0.05 % lotion    DIPROSONE     Apply topically daily       BIOTIN PO          gabapentin 6 % Soln solution     60 mL    Apply 2 cc to scalp at night       hydroxychloroquine 200 MG tablet    PLAQUENIL    60 tablet    Take 1 tablet (200 mg) by mouth 2 times daily Take one tablet daily for one week, and if tolerated increase to twice a day       IMITREX PO      Take 100 mg by mouth as needed.       MULTIVITAMINS PO      Take  by mouth daily.       NATURE-THROID 81.25 MG Tabs   Generic drug:  Thyroid      Take by mouth daily       WELLBUTRIN PO      Take 50 mg by mouth daily

## 2017-06-22 ENCOUNTER — TELEPHONE (OUTPATIENT)
Dept: DERMATOLOGY | Facility: CLINIC | Age: 62
End: 2017-06-22

## 2017-06-24 ENCOUNTER — HEALTH MAINTENANCE LETTER (OUTPATIENT)
Age: 62
End: 2017-06-24

## 2017-06-25 DIAGNOSIS — L30.9 DERMATITIS: ICD-10-CM

## 2017-06-25 DIAGNOSIS — L66.10 LICHEN PLANO-PILARIS: ICD-10-CM

## 2017-06-26 RX ORDER — HYDROXYCHLOROQUINE SULFATE 200 MG/1
200 TABLET, FILM COATED ORAL 2 TIMES DAILY
Qty: 60 TABLET | Refills: 1 | Status: SHIPPED | OUTPATIENT
Start: 2017-06-26 | End: 2017-09-18

## 2017-06-26 NOTE — TELEPHONE ENCOUNTER
Last seen 6/19/17:  1. Lichen planopilaris.     Discussed the importance of decreasing the topical steroids due to scalp atrophy.     Will start gabapentin topical today to decrease the pruritis.     Continue Plaquenil 200 mg one tablet twice a day.     Safety labs today.      Referred for patch testing.      Follow-up 8 weeks.

## 2017-06-26 NOTE — TELEPHONE ENCOUNTER
Received refill request for Plaquenil as the resident on call. Reviewed patient's chart and attached communication. Patient last seen 6/2017 for LPP. RTC 2 months. After reviewing the medication list and assessment and plan from last visit, the refill request was accepted.    Jamil Lund MD  Dermatology Resident, PGY2  Pager: [phone]

## 2017-07-19 ENCOUNTER — TELEPHONE (OUTPATIENT)
Dept: DERMATOLOGY | Facility: CLINIC | Age: 62
End: 2017-07-19

## 2017-07-26 NOTE — TELEPHONE ENCOUNTER
Pt called checking in on this. Has the PA been initiated?    Julia Dubose  [facility]    [phone]

## 2017-07-27 NOTE — TELEPHONE ENCOUNTER
[facility] Prior Authorization Team   Phone: [phone]  Fax: [phone]    PA Initiation    Medication: gabapentin 6 % SOLN solution/compound  Insurance Company: [employer] - Phone [phone] Fax [phone]  Pharmacy Filling the Rx: [facility] - [place], [place] - [address]  Filling Pharmacy Phone: [phone]  Filling Pharmacy Fax: [phone]  Start Date: 7/27/2017    PA done over the phone. We will receive a determination via fax

## 2017-07-31 NOTE — TELEPHONE ENCOUNTER
PRIOR AUTHORIZATION DENIED    Medication: gabapentin 6 % SOLN solution/compound - denied    Denial Date: 7/27/2017    Denial Rational: compound is denied because one or more of the ingredients is excluded from pt's plan                 Appeal Information:

## 2017-08-11 ENCOUNTER — TRANSFERRED RECORDS (OUTPATIENT)
Dept: HEALTH INFORMATION MANAGEMENT | Facility: CLINIC | Age: 62
End: 2017-08-11

## 2017-09-18 ENCOUNTER — OFFICE VISIT (OUTPATIENT)
Dept: DERMATOLOGY | Facility: CLINIC | Age: 62
End: 2017-09-18

## 2017-09-18 VITALS — HEART RATE: 86 BPM | SYSTOLIC BLOOD PRESSURE: 121 MMHG | DIASTOLIC BLOOD PRESSURE: 70 MMHG

## 2017-09-18 DIAGNOSIS — L23.5 ALLERGIC DERMATITIS DUE TO OTHER CHEMICAL PRODUCT: ICD-10-CM

## 2017-09-18 DIAGNOSIS — L29.9 PRURITUS: ICD-10-CM

## 2017-09-18 DIAGNOSIS — Z51.81 THERAPEUTIC DRUG MONITORING: ICD-10-CM

## 2017-09-18 DIAGNOSIS — L66.10 LICHEN PLANO-PILARIS: Primary | ICD-10-CM

## 2017-09-18 DIAGNOSIS — L30.9 DERMATITIS: ICD-10-CM

## 2017-09-18 RX ORDER — CLOBETASOL PROPIONATE 0.5 MG/ML
SOLUTION TOPICAL PRN
Qty: 50 ML | Refills: 3 | Status: SHIPPED | OUTPATIENT
Start: 2017-09-18 | End: 2019-06-14

## 2017-09-18 RX ORDER — CLOBETASOL PROPIONATE 0.5 MG/ML
SOLUTION TOPICAL PRN
COMMUNITY
End: 2017-09-18

## 2017-09-18 RX ORDER — HYDROXYCHLOROQUINE SULFATE 200 MG/1
200 TABLET, FILM COATED ORAL 2 TIMES DAILY
Qty: 60 TABLET | Refills: 1 | Status: SHIPPED | OUTPATIENT
Start: 2017-09-18 | End: 2018-02-03

## 2017-09-18 RX ORDER — FEXOFENADINE HCL 180 MG/1
180 TABLET ORAL DAILY
Qty: 60 TABLET | Refills: 3 | Status: SHIPPED | OUTPATIENT
Start: 2017-09-18 | End: 2017-09-20

## 2017-09-18 RX ORDER — FLUOCINOLONE ACETONIDE 0.11 MG/ML
OIL TOPICAL
Qty: 118 ML | Refills: 3 | Status: CANCELLED | OUTPATIENT
Start: 2017-09-18

## 2017-09-18 ASSESSMENT — PAIN SCALES - GENERAL: PAINLEVEL: NO PAIN (0)

## 2017-09-18 NOTE — LETTER
"9/18/2017       RE: Chitra Monet  [address]  [address]  [place] [address]     Dear Colleague,    Thank you for referring your patient, Chitra Monet, to the [facility] DERMATOLOGY at [facility]. Please see a copy of my visit note below.    [facility] Dermatology Note      Dermatology Problem List:  1. Lichen planopilaris    Encounter Date: Sep 18, 2017    CC:  Chief Complaint   Patient presents with     Hair Loss     Chitra comes to clinic today for Lichen planopilaris. States \" it's still inflamed.\"     History of Present Illness:  Ms. Chitra Monet is a 62 year old female who presents today as follow up for Lichen planopilaris. She was last seen 6/19/17, at which time she was evaluated and we decided to refer her to patch testing as well as add 6% topical gabapentin and continue Plaquenil 200 mg BID.    She saw Dr. Chan on 8/9/17 - 8/11/17 and underwent patch testing that showed she had mild positive reaction to   nickel, linalool, methylisothiezolinone, sodium metabisulfite, methyldibromoglutaronitrile/phenoxyethanol, benzisothiazolinone, disperse dye mix, gold, p-methylaminophenol sulphate (Metol), and iodine. They determined that many of her products contained these ingredients and recommended that she use products off of the [place] safe list.    Since last visit she has stopped the Plaquenil for a week and then restarted with only 200 mg QD around the beginning of August. Since this dose, she has been doing okay. She thinks that 400 mg QD gave her head aches. She still has headaches, but has not had dizziness with the 200 mg. She says that Dr. Chan said to possibly switch to quinacrine +/- chloroquine.     She doesn't think that the hair loss is improving. She thinks that it is stable or possibly worse. She states that she is using clobetasol at night maybe 2x/week. She switched all unscented, fragrance-free products. She thinks that it " is better than when she was using ketoconazole. Her scalp itches a lot less now since switching. She is not using the ketoconazole at all. She is using Free and Clear products.    She has recently increased her dose of her thyroid     She has burning, pruritus, pain, and redness, which is worse at night. Clobetasol does seem to help with the pruritus.     Past Medical History:   Patient Active Problem List   Diagnosis     Lichen plano-pilaris     Dermatitis     Pruritus     Past Medical History:   Diagnosis Date     Constipation     CHRONIC     Cough     CHRONIC     Migraine, unspecified, without mention of intractable migraine without mention of status migrainosus      Unspecified hypothyroidism      Past Surgical History:   Procedure Laterality Date     BIOPSY      BREAST     COLONOSCOPY  6/6/2013    Procedure: COLONOSCOPY;  COLONOSCOPY;  Surgeon: Caridad Young MD;  Location: [facility]     COSMETIC SURGERY      bleph 2 weeks ago     ENT SURGERY      TONSILLECTOMY     GYN SURGERY      LAPAROSCOPY       Social History:  The patient works as a caregiver for seniors.    Family History:  There is no family history of hair loss. Father with ocular cicatricial pemphigoid. Brother with rosacea.    Medications:  Current Outpatient Prescriptions   Medication Sig Dispense Refill     clobetasol (TEMOVATE) 0.05 % external solution Apply topically as needed       hydroxychloroquine (PLAQUENIL) 200 MG tablet Take 1 tablet (200 mg) by mouth 2 times daily 60 tablet 1     Thyroid (NATURE-THROID) 81.25 MG TABS Take by mouth daily       BuPROPion HCl (WELLBUTRIN PO) Take 50 mg by mouth daily       BIOTIN PO        albuterol (ALBUTEROL) 108 (90 BASE) MCG/ACT inhaler Inhale 2 puffs into the lungs as needed. Patient uses 4 days per week.       SUMAtriptan Succinate (IMITREX PO) Take 100 mg by mouth as needed.       Multiple Vitamin (MULTIVITAMINS PO) Take  by mouth daily.       Allergies   Allergen Reactions     Gold Salts Rash      Mild positive reaction to gold from derm patch testing      Iodine Rash     Mild positive reaction to derm patch testing     Linalool Rash     Mild positive reaction from derm patch testing     Methylisothiazolinone Rash     Mild positive reaction from derm patch testing and also to phenoxy ethanol     Nickel Rash     Derm patch test results= mild positive reaction     No Clinical Screening - See Comments Rash     Results of derm skin patch testing :  Mild Positive reactions = Benzisothiazolinone , disperse dye mix  And P-methylaminophenol shlphat (metol).                                                              Doubtful reactions ( possible irritations )= Balsam of [place] and Balsam [place]     Shellac Rash     Doubtful reaction ( possible irritation) from derm patch testing results     Sodium Benzoate Rash     Doubtful reaction ( possible irritation) results from derm patch testing     Sodium Metabisulfite Rash     Mild positive reaction from derm patch testing       Review of Systems:  -Constitutional: The patient denies fatigue, fevers, chills, unintended weight loss, and night sweats.  -Skin: As above in HPI. No additional skin concerns.    Physical exam:  Vitals: /70 (BP Location: Right arm, Patient Position: Chair, Cuff Size: Adult Regular)  Pulse 86  GEN: This is a well developed, well-nourished female in no acute distress, in a pleasant mood.    SKIN: Focused examination of the scalp was performed.  -Decreased hair density on parietal and vertex scalp, stable hair density on temporal scalp and frontal hairline.  -Decreased hair density occipital scalp, erythema and positive hair pull test.  -Erythema of frontotemporal hairline and temporal scalp.   Frontal scalp growth layers   1st: 1-2cm, tapered   2nd: 4 cm, tapered   3rd: 6-8 cm, tapered  Occipital scalp growth layers   1st: 1 cm, tapered  -No involvement of the eyebrows.  -Ridging of nails on bilateral hands.  -No other lesions of concern on areas  examined.     Impression/Plan:  1. 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 Patient decreased Plaquenil to 200 mg QD, has changed hair products to hypoallergenic versions and is using clobetasol solution 2x/week. On exam today she has a large active area covering her occipital scalp.    Continue Plaquenil 200 mg one tablet once a day.    Safety labs ordered 6/19/17. Will check around December. Patient is making eye exam soon.    She is getting labs drawn next week: Vitamin D, Iron studies, Thyroid labs should be done.    Continue clobetasol solution as needed for inflamed, itchy scalp.    Will trial Allegra given her itchy, sensitive skin.       Phone follow-up 4 weeks.   Follow-up in clinic in 3 months.    Staff Involved:  Scribed by Federico Dukes, MS4 for Dr. Quinn.      I agree with the PFSH and ROS as completed by the Medical Student. The remainder of the encounter was performed by me and scribed by the Medical Student. The scribed note accurately reflects my personal services and the medical decisions made by me.      Rossana Quinn MD  Professor and Chair  Department of Dermatology  [facility]                        Pictures were placed in Pt's chart today for future reference.      Again, thank you for allowing me to participate in the care of your patient.      Sincerely,    Rossana Quinn MD

## 2017-09-18 NOTE — PROGRESS NOTES
"[facility] Dermatology Note      Dermatology Problem List:  1. Lichen planopilaris    Encounter Date: Sep 18, 2017    CC:  Chief Complaint   Patient presents with     Hair Loss     Chitra comes to clinic today for Lichen planopilaris. States \" it's still inflamed.\"     History of Present Illness:  Ms. Chitra Monet is a 62 year old female who presents today as follow up for Lichen planopilaris. She was last seen 6/19/17, at which time she was evaluated and we decided to refer her to patch testing as well as add 6% topical gabapentin and continue Plaquenil 200 mg BID.    She saw Dr. Chan on 8/9/17 - 8/11/17 and underwent patch testing that showed she had mild positive reaction to   nickel, linalool, methylisothiezolinone, sodium metabisulfite, methyldibromoglutaronitrile/phenoxyethanol, benzisothiazolinone, disperse dye mix, gold, p-methylaminophenol sulphate (Metol), and iodine. They determined that many of her products contained these ingredients and recommended that she use products off of the [employer] safe list.    Since last visit she has stopped the Plaquenil for a week and then restarted with only 200 mg QD around the beginning of August. Since this dose, she has been doing okay. She thinks that 400 mg QD gave her head aches. She still has headaches, but has not had dizziness with the 200 mg. She says that Dr. Chan said to possibly switch to quinacrine +/- chloroquine.     She doesn't think that the hair loss is improving. She thinks that it is stable or possibly worse. She states that she is using clobetasol at night maybe 2x/week. She switched all unscented, fragrance-free products. She thinks that it is better than when she was using ketoconazole. Her scalp itches a lot less now since switching. She is not using the ketoconazole at all. She is using Free and Clear products.    She has recently increased her dose of her thyroid     She has burning, pruritus, pain, and redness, which is " worse at night. Clobetasol does seem to help with the pruritus.     Past Medical History:   Patient Active Problem List   Diagnosis     Lichen plano-pilaris     Dermatitis     Pruritus     Past Medical History:   Diagnosis Date     Constipation     CHRONIC     Cough     CHRONIC     Migraine, unspecified, without mention of intractable migraine without mention of status migrainosus      Unspecified hypothyroidism      Past Surgical History:   Procedure Laterality Date     BIOPSY      BREAST     COLONOSCOPY  6/6/2013    Procedure: COLONOSCOPY;  COLONOSCOPY;  Surgeon: Caridad Young MD;  Location: [facility]     COSMETIC SURGERY      bleph 2 weeks ago     ENT SURGERY      TONSILLECTOMY     GYN SURGERY      LAPAROSCOPY       Social History:  The patient works as a caregiver for seniors.    Family History:  There is no family history of hair loss. Father with ocular cicatricial pemphigoid. Brother with rosacea.    Medications:  Current Outpatient Prescriptions   Medication Sig Dispense Refill     clobetasol (TEMOVATE) 0.05 % external solution Apply topically as needed       hydroxychloroquine (PLAQUENIL) 200 MG tablet Take 1 tablet (200 mg) by mouth 2 times daily 60 tablet 1     Thyroid (NATURE-THROID) 81.25 MG TABS Take by mouth daily       BuPROPion HCl (WELLBUTRIN PO) Take 50 mg by mouth daily       BIOTIN PO        albuterol (ALBUTEROL) 108 (90 BASE) MCG/ACT inhaler Inhale 2 puffs into the lungs as needed. Patient uses 4 days per week.       SUMAtriptan Succinate (IMITREX PO) Take 100 mg by mouth as needed.       Multiple Vitamin (MULTIVITAMINS PO) Take  by mouth daily.       Allergies   Allergen Reactions     Gold Salts Rash     Mild positive reaction to gold from derm patch testing      Iodine Rash     Mild positive reaction to derm patch testing     Linalool Rash     Mild positive reaction from derm patch testing     Methylisothiazolinone Rash     Mild positive reaction from derm patch testing and also to phenoxy  ethanol     Nickel Rash     Derm patch test results= mild positive reaction     No Clinical Screening - See Comments Rash     Results of derm skin patch testing :  Mild Positive reactions = Benzisothiazolinone , disperse dye mix  And P-methylaminophenol shlphat (metol).                                                              Doubtful reactions ( possible irritations )= Balsam of [place] and Balsam [place]     Shellac Rash     Doubtful reaction ( possible irritation) from derm patch testing results     Sodium Benzoate Rash     Doubtful reaction ( possible irritation) results from derm patch testing     Sodium Metabisulfite Rash     Mild positive reaction from derm patch testing       Review of Systems:  -Constitutional: The patient denies fatigue, fevers, chills, unintended weight loss, and night sweats.  -Skin: As above in HPI. No additional skin concerns.    Physical exam:  Vitals: /70 (BP Location: Right arm, Patient Position: Chair, Cuff Size: Adult Regular)  Pulse 86  GEN: This is a well developed, well-nourished female in no acute distress, in a pleasant mood.    SKIN: Focused examination of the scalp was performed.  -Decreased hair density on parietal and vertex scalp, stable hair density on temporal scalp and frontal hairline.  -Decreased hair density occipital scalp, erythema and positive hair pull test.  -Erythema of frontotemporal hairline and temporal scalp.   Frontal scalp growth layers   1st: 1-2cm, tapered   2nd: 4 cm, tapered   3rd: 6-8 cm, tapered  Occipital scalp growth layers   1st: 1 cm, tapered  -No involvement of the eyebrows.  -Ridging of nails on bilateral hands.  -No other lesions of concern on areas examined.     Impression/Plan:  1. 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 Patient decreased Plaquenil to 200 mg QD, has changed hair products to hypoallergenic versions and is using clobetasol solution 2x/week. On  exam today she has a large active area covering her occipital scalp.    Continue Plaquenil 200 mg one tablet once a day.    Safety labs ordered 6/19/17. Will check around December. Patient is making eye exam soon.    She is getting labs drawn next week: Vitamin D, Iron studies, Thyroid labs should be done.    Continue clobetasol solution as needed for inflamed, itchy scalp.    Will trial Allegra given her itchy, sensitive skin.       Phone follow-up 4 weeks.   Follow-up in clinic in 3 months.    Staff Involved:  Scribed by Federico Dukes, MS4 for Dr. Quinn.      I agree with the PFSH and ROS as completed by the Medical Student. The remainder of the encounter was performed by me and scribed by the Medical Student. The scribed note accurately reflects my personal services and the medical decisions made by me.      Rossana Quinn MD  Professor and Chair  Department of Dermatology  [facility]

## 2017-09-18 NOTE — NURSING NOTE
"Dermatology Rooming Note    Chitra Monet's goals for this visit include:   Chief Complaint   Patient presents with     Hair Loss     Chitra comes to clinic today for Lichen planopilaris. States \" it's still inflamed.\"       Char Rosenbaum LPN       "

## 2017-09-18 NOTE — PATIENT INSTRUCTIONS
Vitamin D, Iron studies (Ferritin, Iron, TIBC), Thyroid (TSH with reflex) labs should be done.  Continue Plaquenil 200 mg one tablet once a day.  Continue clobetasol solution as needed for inflamed, itchy scalp.  Take Allegra in the morning. You may need to redose at 6pm. If you're itchy at night, you can use Benadryl.  Phone follow-up in 4 weeks.  Return to clinic in 3 months.    Patch testing :    Ph: [phone]  Fax:[phone]

## 2017-09-18 NOTE — MR AVS SNAPSHOT
After Visit Summary   9/18/2017    Chitra Monet    MRN: [id number]           Patient Information     Date Of Birth          1955        Visit Information        Provider Department      9/18/2017 1:50 PM Rossana Quinn MD [facility] Dermatology        Today's Diagnoses     Lichen plano-pilaris    -  1    Therapeutic drug monitoring        Dermatitis          Care Instructions    Vitamin D, Iron studies (Ferritin, Iron, TIBC), Thyroid (TSH with reflex) labs should be done.  Continue Plaquenil 200 mg one tablet once a day.  Continue clobetasol solution as needed for inflamed, itchy scalp.  Take Allegra in the morning. You may need to redose at 6pm. If you're itchy at night, you can use Benadryl.  Phone follow-up in 4 weeks.  Return to clinic in 3 months.    Patch testing :    Ph: [phone]  Fax:[phone]          Follow-ups after your visit        Follow-up notes from your care team     Return in about 3 months (around 12/18/2017).      Who to contact     Please call your clinic at [phone] to:    Ask questions about your health    Make or cancel appointments    Discuss your medicines    Learn about your test results    Speak to your doctor   If you have compliments or concerns about an experience at your clinic, or if you wish to file a complaint, please contact [facility] Patient Relations at [phone] or email us at [email or web address]         Additional Information About Your Visit        [employer] Information     [employer] bookt gives you secure access to your electronic health record. If you see a primary care provider, you can also send messages to your care team and make appointments. If you have questions, please call your primary care clinic.  If you do not have a primary care provider, please call [phone] and they will assist you.      [employer] is an electronic gateway that provides easy, online access to your medical records.  With [employer], you can request a clinic appointment, read your test results, renew a prescription or communicate with your care team.     To access your existing account, please contact your [facility] or call [phone] for assistance.        Care EveryWhere ID     This is your Care EveryWhere ID. This could be used by other organizations to access your [place] medical records  [id number]        Your Vitals Were     Pulse                   86            Blood Pressure from Last 3 Encounters:   09/18/17 121/70   06/19/17 116/70   02/28/17 138/79    Weight from Last 3 Encounters:   06/06/13 71.7 kg (158 lb)              Today, you had the following     No orders found for display         Today's Medication Changes          These changes are accurate as of: 9/18/17  3:32 PM.  If you have any questions, ask your nurse or doctor.               Stop taking these medicines if you haven't already. Please contact your care team if you have questions.     betamethasone dipropionate 0.05 % lotion   Commonly known as:  DIPROSONE   Stopped by:  Rossana Quinn MD           gabapentin 6 % Soln solution   Stopped by:  Rossana Quinn MD                    Primary Care Provider Office Phone # Fax #    Amy Garcia [phone]       [address] [address]        Equal Access to Services     [facility]: Hadii rosa isela rangel hadasho Sokatie, waaxda luqadaha, qaybta kaalmada darion, delilah jaramillo. So [facility] [phone].    ATENCIÓN: Si habla español, tiene a bowling disposición servicios gratuitos de asistencia lingüística. Paul al [phone].    We comply with applicable federal civil rights laws and [place] laws. We do not discriminate on the basis of race, color, national origin, age, disability sex, sexual orientation or gender identity.            Thank you!     Thank you for  choosing [facility] DERMATOLOG[facility]  for your care. Our goal is always to provide you with excellent care. Hearing back from our patients is one way we can continue to improve our services. Please take a few minutes to complete the written survey that you may receive in the mail after your visit with us. Thank you!             Your Updated Medication List - Protect others around you: Learn how to safely use, store and throw away your medicines at www.disposemymeds.org.          This list is accurate as of: 9/18/17  3:32 PM.  Always use your most recent med list.                   Brand Name Dispense Instructions for use Diagnosis    BIOTIN PO           clobetasol 0.05 % external solution    TEMOVATE     Apply topically as needed        hydroxychloroquine 200 MG tablet    PLAQUENIL    60 tablet    Take 1 tablet (200 mg) by mouth 2 times daily    Lichen plano-pilaris, Dermatitis       IMITREX PO      Take 100 mg by mouth as needed.        MULTIVITAMINS PO      Take  by mouth daily.        NATURE-THROID 81.25 MG Tabs   Generic drug:  Thyroid      Take by mouth daily        PROAIR  (90 BASE) MCG/ACT Inhaler   Generic drug:  albuterol      Inhale 2 puffs into the lungs as needed. Patient uses 4 days per week.        WELLBUTRIN PO      Take 50 mg by mouth daily

## 2017-09-20 DIAGNOSIS — L29.9 PRURITUS: ICD-10-CM

## 2017-09-20 RX ORDER — FEXOFENADINE HCL 180 MG/1
180 TABLET ORAL DAILY
Qty: 60 TABLET | Refills: 3 | Status: SHIPPED | OUTPATIENT
Start: 2017-09-20 | End: 2020-10-13

## 2017-09-20 NOTE — TELEPHONE ENCOUNTER
Pt requesting a refill to see if her insurance will cover this medication since Dr. Ren recommended it.  Was just seen on 9/18/17    Impression/Plan:  1. 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 Patient decreased Plaquenil to 200 mg QD, has changed hair products to hypoallergenic versions and is using clobetasol solution 2x/week. On exam today she has a large active area covering her occipital scalp.    Continue Plaquenil 200 mg one tablet once a day.    Safety labs ordered 6/19/17. Will check around December. Patient is making eye exam soon.    She is getting labs drawn next week: Vitamin D, Iron studies, Thyroid labs should be done.    Continue clobetasol solution as needed for inflamed, itchy scalp.    Will trial Allegra given her itchy, sensitive skin.         Phone follow-up 4 weeks.   Follow-up in clinic in 3 months.

## 2017-09-25 ENCOUNTER — TELEPHONE (OUTPATIENT)
Dept: DERMATOLOGY | Facility: CLINIC | Age: 62
End: 2017-09-25

## 2017-09-25 NOTE — TELEPHONE ENCOUNTER
Prior Authorization Retail Medication Request  Medication/Dose: Allegra  Diagnosis and ICD code: Pruritus [L29.9]   New/Renewal/Insurance Change PA:   Previously Tried and Failed Therapies:     Insurance ID (if provided):   Insurance Phone (if provided):     Any additional info from fax request:     If you received a fax notification from an outside Pharmacy:  Pharmacy Name:[facility]  Pharmacy #:[phone]  Pharmacy Fax:[phone]

## 2017-09-26 NOTE — TELEPHONE ENCOUNTER
[facility] Prior Authorization Team   Phone: [phone]  Fax: [phone]      PA Initiation    Medication: fexofenadine (Allegra) 180mg tablets  Insurance Company: [employer] - Phone [phone] Fax [phone]  Pharmacy Filling the Rx: [id number] - [place], [place] - [address] [place] [address]  Filling Pharmacy Phone: [phone]  Filling Pharmacy Fax: [phone]  Start Date: 9/26/2017    Manually faxed PA form to [employer] at fax: [phone]

## 2017-09-27 NOTE — TELEPHONE ENCOUNTER
Answered additional questions and faxed back to [employer] at fax: [phone]; phone:[phone]

## 2017-09-27 NOTE — TELEPHONE ENCOUNTER
Answered some more additional questions and faxed back to [employer] at fax: [phone], ph: [phone]

## 2017-09-29 NOTE — TELEPHONE ENCOUNTER
[facility] Prior Authorization Team   Phone: [phone]  Fax: [phone]      PRIOR AUTHORIZATION DENIED    Medication: fexofenadine (Allegra) 180mg tablets- Denied    Denial Date: 9/29/2017    Denial Rational: Denied due to [employer] formulary only covering a limited number of over-the-counter products, this medication is not included in their benefit plan:         Appeal Information:

## 2017-10-15 PROBLEM — L23.5 ALLERGIC DERMATITIS DUE TO OTHER CHEMICAL PRODUCT: Status: ACTIVE | Noted: 2017-10-15

## 2017-11-07 ENCOUNTER — HOSPITAL ENCOUNTER (OUTPATIENT)
Dept: MAMMOGRAPHY | Facility: CLINIC | Age: 62
Discharge: HOME | End: 2017-11-07
Attending: FAMILY MEDICINE | Admitting: FAMILY MEDICINE
Payer: COMMERCIAL

## 2017-11-07 DIAGNOSIS — Z12.31 VISIT FOR SCREENING MAMMOGRAM: ICD-10-CM

## 2017-11-07 PROCEDURE — G0202 SCR MAMMO BI INCL CAD: HCPCS

## 2017-11-09 ENCOUNTER — TELEPHONE (OUTPATIENT)
Dept: DERMATOLOGY | Facility: CLINIC | Age: 62
End: 2017-11-09

## 2017-11-09 DIAGNOSIS — L30.9 DERMATITIS: Primary | ICD-10-CM

## 2017-11-09 RX ORDER — TACROLIMUS 1 MG/G
OINTMENT TOPICAL
Qty: 60 G | Refills: 1 | Status: SHIPPED | OUTPATIENT
Start: 2017-11-09

## 2017-11-09 NOTE — TELEPHONE ENCOUNTER
I had the opportunity to speak with Ms. Monet today. She reports improvement with her itch! I recommended she continue to take both the Allegra during the day time and Benadryl in the evening. Chitra also reports she noticed hair regrowth in the back of the head today. Ms. Monet did have an eye exam recently and it was normal; she is scheduled for a more comprehensive exam in the next few weeks. Of note, she has not had to use clobetasol solution. She continues to avoid fragrance containing products. She does though report increased scalp redness after she uses her fragrance free hair spray.  Recommend we continue the same treatment plan until she is seen in mid December and add tacrolimus ointment, a fine thin layer to the active area along the frontal hair line, especially before she uses any type of hair spray product.     She already has an appointment scheduled for mid December.    Rossana Quinn MD  Professor and Chair  Department of Dermatology

## 2017-11-10 ENCOUNTER — TELEPHONE (OUTPATIENT)
Dept: DERMATOLOGY | Facility: CLINIC | Age: 62
End: 2017-11-10

## 2017-11-10 NOTE — TELEPHONE ENCOUNTER
[facility] Prior Authorization Team   Phone: [phone]  Fax: [phone]      Prior Authorization Approval- epa    Authorization Effective Date: 11/9/2017  Authorization Expiration Date: 11/10/2018  Medication: tacrolimus (PROTOPIC) 0.1 % ointment   Insurance Company:    Expected CoPay: 0.00      CoPay Card Available:      Foundation Assistance Needed:    Which Pharmacy is filling the prescription (Not needed for infusion/clinic administered): [id number] - [place], [place] - [address] [place] [address]  Pharmacy Notified:  Yes   Patient Notified:  no

## 2017-11-17 ENCOUNTER — MYC MEDICAL ADVICE (OUTPATIENT)
Dept: DERMATOLOGY | Facility: CLINIC | Age: 62
End: 2017-11-17

## 2017-12-05 DIAGNOSIS — L65.9 LOSS OF HAIR: Primary | ICD-10-CM

## 2017-12-08 ENCOUNTER — TELEPHONE (OUTPATIENT)
Dept: DERMATOLOGY | Facility: CLINIC | Age: 62
End: 2017-12-08

## 2017-12-08 NOTE — TELEPHONE ENCOUNTER
She received her script in the mail but there was no letter accompanying it for her insurance. Writer will let provider know and follow up on Monday.

## 2017-12-12 NOTE — TELEPHONE ENCOUNTER
Spoke with Dr Quinn who states she will contact patient before she leaves clinic today. Informed patient and she would like to be called at [phone].

## 2017-12-12 NOTE — TELEPHONE ENCOUNTER
Apologized to Chitra for not following up on Monday as planned. Informed her that I discussed her request with Dr Quinn and also let her know that she is requesting A.A be on the script. She understands nothing further has been done at this point and we will again follow up tomorrow.

## 2017-12-13 ENCOUNTER — TELEPHONE (OUTPATIENT)
Dept: DERMATOLOGY | Facility: CLINIC | Age: 62
End: 2017-12-13

## 2017-12-13 NOTE — LETTER
"12/13/2017      RE: Chitra Monet  [address]  [address]  [facility] [address]       This afternoon I had the opportunity to talk with Ms. Monet regarding her request for a scalp prosthesis. Will update her prescription to read \"one full cranial prosthesis\" and address the letter of necessity To Whom It May Concern.     Will follow-up with Chitra in clinic on Monday if there are any still any outstanding issues.     Rossana Quinn MD    "

## 2017-12-13 NOTE — TELEPHONE ENCOUNTER
"This afternoon I had the opportunity to talk with Ms. Monet regarding her request for a scalp prosthesis. Will update her prescription to read \"one full cranial prosthesis\" and address the letter of necessity To Whom It May Concern.     Will follow-up with Chitra in clinic on Monday if there are any still any outstanding issues.     Rossana Quinn MD  "

## 2017-12-14 NOTE — TELEPHONE ENCOUNTER
"Pt would like Sierra to call back. The prescription should state \"one full cranial prosthetics\"  and \"Alopecia areata\"       "

## 2017-12-14 NOTE — TELEPHONE ENCOUNTER
Spoke with patient. She understands she will receive letter and script at visit on Monday. No further questions.

## 2017-12-18 ENCOUNTER — OFFICE VISIT (OUTPATIENT)
Dept: DERMATOLOGY | Facility: CLINIC | Age: 62
End: 2017-12-18
Payer: COMMERCIAL

## 2017-12-18 VITALS — DIASTOLIC BLOOD PRESSURE: 84 MMHG | SYSTOLIC BLOOD PRESSURE: 135 MMHG | HEART RATE: 98 BPM

## 2017-12-18 DIAGNOSIS — L66.10 LICHEN PLANO-PILARIS: ICD-10-CM

## 2017-12-18 DIAGNOSIS — L66.10 LICHEN PLANO-PILARIS: Primary | ICD-10-CM

## 2017-12-18 DIAGNOSIS — L65.9 LOSS OF HAIR: ICD-10-CM

## 2017-12-18 DIAGNOSIS — Z79.899 ENCOUNTER FOR LONG-TERM (CURRENT) USE OF HIGH-RISK MEDICATION: ICD-10-CM

## 2017-12-18 LAB
ALBUMIN SERPL-MCNC: 4 G/DL (ref 3.4–5)
ALP SERPL-CCNC: 74 U/L (ref 40–150)
ALT SERPL W P-5'-P-CCNC: 27 U/L (ref 0–50)
AST SERPL W P-5'-P-CCNC: 23 U/L (ref 0–45)
BASOPHILS # BLD AUTO: 0.1 10E9/L (ref 0–0.2)
BASOPHILS NFR BLD AUTO: 0.9 %
BILIRUB DIRECT SERPL-MCNC: <0.1 MG/DL (ref 0–0.2)
BILIRUB SERPL-MCNC: 0.3 MG/DL (ref 0.2–1.3)
DIFFERENTIAL METHOD BLD: NORMAL
EOSINOPHIL # BLD AUTO: 0.2 10E9/L (ref 0–0.7)
EOSINOPHIL NFR BLD AUTO: 3.9 %
ERYTHROCYTE [DISTWIDTH] IN BLOOD BY AUTOMATED COUNT: 12.5 % (ref 10–15)
HCT VFR BLD AUTO: 41 % (ref 35–47)
HGB BLD-MCNC: 13.8 G/DL (ref 11.7–15.7)
IMM GRANULOCYTES # BLD: 0 10E9/L (ref 0–0.4)
IMM GRANULOCYTES NFR BLD: 0.2 %
LYMPHOCYTES # BLD AUTO: 1.6 10E9/L (ref 0.8–5.3)
LYMPHOCYTES NFR BLD AUTO: 29.8 %
MCH RBC QN AUTO: 32.4 PG (ref 26.5–33)
MCHC RBC AUTO-ENTMCNC: 33.7 G/DL (ref 31.5–36.5)
MCV RBC AUTO: 96 FL (ref 78–100)
MONOCYTES # BLD AUTO: 0.4 10E9/L (ref 0–1.3)
MONOCYTES NFR BLD AUTO: 7.4 %
NEUTROPHILS # BLD AUTO: 3.2 10E9/L (ref 1.6–8.3)
NEUTROPHILS NFR BLD AUTO: 57.8 %
NRBC # BLD AUTO: 0 10*3/UL
NRBC BLD AUTO-RTO: 0 /100
PLATELET # BLD AUTO: 280 10E9/L (ref 150–450)
PROT SERPL-MCNC: 7.7 G/DL (ref 6.8–8.8)
RBC # BLD AUTO: 4.26 10E12/L (ref 3.8–5.2)
WBC # BLD AUTO: 5.4 10E9/L (ref 4–11)

## 2017-12-18 RX ORDER — FLUOCINOLONE ACETONIDE 0.11 MG/ML
OIL TOPICAL WEEKLY
Qty: 118 ML | Refills: 1 | Status: SHIPPED | OUTPATIENT
Start: 2017-12-18

## 2017-12-18 ASSESSMENT — PAIN SCALES - GENERAL
PAINLEVEL: MILD PAIN (2)
PAINLEVEL: NO PAIN (0)

## 2017-12-18 NOTE — MR AVS SNAPSHOT
After Visit Summary   12/18/2017    Chitra Monet    MRN: [id number]           Patient Information     Date Of Birth          1955        Visit Information        Provider Department      12/18/2017 2:55 PM Rossana Quinn MD [facility]        Today's Diagnoses     Lichen plano-pilaris    -  1    Loss of hair        Encounter for long-term (current) use of high-risk medication           Follow-ups after your visit        Your next 10 appointments already scheduled     Feb 27, 2018  1:20 PM CST   (Arrive by 1:05 PM)   RETURN HAIRLOSS with Rossana Quinn MD   [facility] ([facility])    [address] [address] [phone]              Future tests that were ordered for you today     Open Future Orders        Priority Expected Expires Ordered    CBC with platelets differential Routine  12/18/2018 12/18/2017            Who to contact     Please call your clinic at [phone] to:    Ask questions about your health    Make or cancel appointments    Discuss your medicines    Learn about your test results    Speak to your doctor   If you have compliments or concerns about an experience at your clinic, or if you wish to file a complaint, please contact [facility] Patient Relations at [phone] or email us at [email or web address]         Additional Information About Your Visit        MyChart Information     Walk Score gives you secure access to your electronic health record. If you see a primary care provider, you can also send messages to your care team and make appointments. If you have questions, please call your primary care clinic.  If you do not have a primary care provider, please call [phone] and they will assist you.      Walk Score is an electronic gateway that provides easy, online access to your medical records. With Walk [employer], you can request a  clinic appointment, read your test results, renew a prescription or communicate with your care team.     To access your existing account, please contact your [facility] or call [phone] for assistance.        Care EveryWhere ID     This is your Care EveryWhere ID. This could be used by other organizations to access your [facility] medical records  [id number]        Your Vitals Were     Pulse                   98            Blood Pressure from Last 3 Encounters:   12/18/17 135/84   09/18/17 121/70   06/19/17 116/70    Weight from Last 3 Encounters:   06/06/13 71.7 kg (158 lb)              We Performed the Following     Hepatic panel          Today's Medication Changes          These changes are accurate as of: 12/18/17  4:14 PM.  If you have any questions, ask your nurse or doctor.               These medicines have changed or have updated prescriptions.        Dose/Directions    * NONFORMULARY   This may have changed:  Another medication with the same name was added. Make sure you understand how and when to take each.   Used for:  Loss of hair   Changed by:  Rossana Quinn MD        One scalp prosthesis   Quantity:  1 each   Refills:  1       * NONFORMULARY   This may have changed:  You were already taking a medication with the same name, and this prescription was added. Make sure you understand how and when to take each.   Used for:  Lichen plano-pilaris   Changed by:  Rossana Quinn MD        One full scalp cranial prosthesis   Quantity:  1 each   Refills:  5       * NONFORMULARY   This may have changed:  You were already taking a medication with the same name, and this prescription was added. Make sure you understand how and when to take each.   Used for:  Lichen plano-pilaris   Changed by:  Rossana Quinn MD        One low level laser light   Quantity:  1 each   Refills:  2       * Notice:  This list has 3 medication(s) that are the  same as other medications prescribed for you. Read the directions carefully, and ask your doctor or other care provider to review them with you.         Where to get your medicines      Some of these will need a paper prescription and others can be bought over the counter.  Ask your nurse if you have questions.     Bring a paper prescription for each of these medications     NONFORMULARY    NONFORMULARY    NONFORMULARY                Primary Care Provider Office Phone # Fax #    Amy Garcia [phone]       [facility] [address]  [facility] [address]        Equal Access to Services     [facility]: Hadii aad ku hadasho Soomaali, waaxda luqadaha, qaybta kaalmada adeegyada, waxshanti carrasquillo . So [facility] [phone].    ATENCIÓN: Si habla español, tiene a bowling disposición servicios gratuitos de asistencia lingüística. [facility] [phone].    We comply with applicable federal civil rights laws and [place] laws. We do not discriminate on the basis of race, color, national origin, age, disability, sex, sexual orientation, or gender identity.            Thank you!     Thank you for choosing [facility]  for your care. Our goal is always to provide you with excellent care. Hearing back from our patients is one way we can continue to improve our services. Please take a few minutes to complete the written survey that you may receive in the mail after your visit with us. Thank you!             Your Updated Medication List - Protect others around you: Learn how to safely use, store and throw away your medicines at www.disposemymeds.org.          This list is accurate as of: 12/18/17  4:14 PM.  Always use your most recent med list.                   Brand Name Dispense Instructions for use Diagnosis    BIOTIN PO           clobetasol 0.05 % external solution    TEMOVATE    50 mL    Apply topically as needed    Lichen plano-pilaris, Dermatitis        fexofenadine 180 MG tablet    ALLEGRA ALLERGY    60 tablet    Take 1 tablet (180 mg) by mouth daily    Pruritus       hydroxychloroquine 200 MG tablet    PLAQUENIL    60 tablet    Take 1 tablet (200 mg) by mouth 2 times daily    Lichen plano-pilaris, Dermatitis       IMITREX PO      Take 100 mg by mouth as needed.        MULTIVITAMINS PO      Take  by mouth daily.        NATURE-THROID 81.25 MG Tabs   Generic drug:  Thyroid      Take by mouth daily        * NONFORMULARY     1 each    One scalp prosthesis    Loss of hair       * NONFORMULARY     1 each    One full scalp cranial prosthesis    Lichen plano-pilaris       * NONFORMULARY     1 each    One low level laser light    Lichen plano-pilaris       PROAIR  (90 BASE) MCG/ACT Inhaler   Generic drug:  albuterol      Inhale 2 puffs into the lungs as needed. Patient uses 4 days per week.        tacrolimus 0.1 % ointment    PROTOPIC    60 g    Apply a thin layer to frontal scalp area before applying hair spray    Dermatitis       WELLBUTRIN PO      Take 50 mg by mouth daily        * Notice:  This list has 3 medication(s) that are the same as other medications prescribed for you. Read the directions carefully, and ask your doctor or other care provider to review them with you.

## 2017-12-18 NOTE — NURSING NOTE
Drug Administration Record    Drug Name: triamcinolone acetonide(kenalog)  Dose: 1mL of triamcinolone 10mg/mL, 10mg dose  Route administered: ID  NDC #: Kenalog-10 ([address]-50)  Amount of waste(mL):4  Reason for waste: Single use vial

## 2017-12-18 NOTE — NURSING NOTE
"Dermatology Rooming Note    Chitra Monet's goals for this visit include:   Chief Complaint   Patient presents with     Hair Loss     Lichen planopilaris - \"It seems less itchy.\"     Keri Delgadillo, CMA  "

## 2017-12-18 NOTE — PROGRESS NOTES
"[facility] Dermatology Note      Dermatology Problem List:  1. Lichen planopilaris, ILK-10 today  2. ACD: patch testing 8/9/2017  mild positive reaction to nickel, linalool, methylisothiezolinone, sodium metabisulfite, methyldibromoglutaronitrile/phenoxyethanol, benzisothiazolinone, disperse dye mix, gold, p-methylaminophenol sulphate (Metol), and iodine. They determined that many of her products contained these ingredients and recommended that she use products off of the [place] safe list.    Encounter Date: Dec 18, 2017    CC:  Chief Complaint   Patient presents with     Hair Loss     Lichen planopilaris - \"It seems less itchy.\"     History of Present Illness:  Ms. Chitra Monet is a 62 year old female who presents today as follow up for Lichen planopilaris. She was last seen 9/2017: She is using Plaquenil 200 mg daily (lower dosing because she has had headaches with BID dosing). Denies headaches, changes in vision, abdominal distress. She is still shedding and having some occasional itching and tenderness in the scalp though does not have any of this today. Is otherwise feeling well today without other skin concerns. Is still interested in obtaining a full scalp prosthesis today.       Past Medical History:   Patient Active Problem List   Diagnosis     Lichen plano-pilaris     Dermatitis     Pruritus     Allergic dermatitis due to other chemical product     Past Medical History:   Diagnosis Date     Constipation     CHRONIC     Cough     CHRONIC     Migraine, unspecified, without mention of intractable migraine without mention of status migrainosus      Unspecified hypothyroidism      Past Surgical History:   Procedure Laterality Date     BIOPSY      BREAST     COLONOSCOPY  6/6/2013    Procedure: COLONOSCOPY;  COLONOSCOPY;  Surgeon: Caridad Young MD;  Location: [facility]     COSMETIC SURGERY      bleph 2 weeks ago     ENT SURGERY      TONSILLECTOMY     GYN SURGERY      LAPAROSCOPY "       Social History:  The patient works as a caregiver for seniors.    Family History:  There is no family history of hair loss. Father with ocular cicatricial pemphigoid. Brother with rosacea.    Medications:  Current Outpatient Prescriptions   Medication Sig Dispense Refill     NONFORMULARY One scalp prosthesis 1 each 1     tacrolimus (PROTOPIC) 0.1 % ointment Apply a thin layer to frontal scalp area before applying hair spray 60 g 1     fexofenadine (ALLEGRA ALLERGY) 180 MG tablet Take 1 tablet (180 mg) by mouth daily 60 tablet 3     hydroxychloroquine (PLAQUENIL) 200 MG tablet Take 1 tablet (200 mg) by mouth 2 times daily 60 tablet 1     clobetasol (TEMOVATE) 0.05 % external solution Apply topically as needed 50 mL 3     Thyroid (NATURE-THROID) 81.25 MG TABS Take by mouth daily       BuPROPion HCl (WELLBUTRIN PO) Take 50 mg by mouth daily       BIOTIN PO        albuterol (ALBUTEROL) 108 (90 BASE) MCG/ACT inhaler Inhale 2 puffs into the lungs as needed. Patient uses 4 days per week.       SUMAtriptan Succinate (IMITREX PO) Take 100 mg by mouth as needed.       Multiple Vitamin (MULTIVITAMINS PO) Take  by mouth daily.       Allergies   Allergen Reactions     Gold Salts Rash     Mild positive reaction to gold from derm patch testing      Iodine Rash     Mild positive reaction to derm patch testing     Linalool Rash     Mild positive reaction from derm patch testing     Methylisothiazolinone Rash     Mild positive reaction from derm patch testing and also to phenoxy ethanol     Nickel Rash     Derm patch test results= mild positive reaction     No Clinical Screening - See Comments Rash     Results of derm skin patch testing :  Mild Positive reactions = Benzisothiazolinone , disperse dye mix  And P-methylaminophenol shlphat (metol).                                                              Doubtful reactions ( possible irritations )= Balsam of [place] and Balsam Sherif     Shellac Rash     Doubtful reaction ( possible  irritation) from derm patch testing results     Sodium Benzoate Rash     Doubtful reaction ( possible irritation) results from derm patch testing     Sodium Metabisulfite Rash     Mild positive reaction from derm patch testing       Review of Systems:  -Skin: As above in HPI. No additional skin concerns.    Physical exam:  Vitals: /84  Pulse 98  GEN: This is a well developed, well-nourished female in no acute distress, in a pleasant mood.    SKIN: Focused examination of the scalp was performed.  -Decreased hair density on parietal and vertex scalp, stable hair density on temporal scalp and frontal hairline.  -Decreased hair density occipital scalp, erythema and positive hair pull test.  -Erythema of frontotemporal hairline and temporal scalp.   Occipital scalp growth layers   1st: 3 cm, tapered  -No involvement of the eyebrows.  -Ridging of nails on bilateral hands.  -No other lesions of concern on areas examined.   LPPAI: 3.5    Impression/Plan:  1. 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 Given her number of contact allergens, reducing the number of products safe to use on her scalp, we think the low level laser light therapy may benefit her as she is still showing inflammation in the scalp.     Continue Plaquenil 200 mg one tablet once a day.    Safety labs ordered 12/18/17    LABS: CBC, LFT    Patient has eye exam soon.    Begin low laser light therapy; use three times weekly    Continue clobetasol solution as needed for inflamed, itchy scalp.    Consider starting fluocinolone oil once week once confirmed there are no ingredients she is sensitive to    See below for Fluocinolone oil per [employer] brand ingredients    Allegra QAM given her itchy, sensitive skin; Benadryl  nightly     ILK-10 1 ml today into occipital scalp into 10 locations        Follow-up in clinic in 3 months.      FLUOCINOLONE ACETONIDE (UNII: [id number]) (FLUOCINOLONE ACETONIDE - UNII:[id number]) FLUOCINOLONE ACETONIDE 0.11 mg in 118.28 mL     Inactive Ingredients   Ingredient Name Strength   ISOPROPYL ALCOHOL (UNII: [id number])    ISOPROPYL MYRISTATE (UNII: [id number])    LIGHT MINERAL OIL (UNII: [id number])    PEANUT OIL (UNII: [id number])    OLETH-2 (UNII: [id number]      Staff Involved:  Seen and staffed with Dr. Rossana Kraus MD  PGY4 Dermatology Resident    Patient was seen and examined with the dermatology resident. I agree with the history, review of systems, physical examination, assessments and plan. ILK injections were done together.     Rossana Quinn MD  Professor and  Chair  Department of Dermatology  [facility]

## 2017-12-18 NOTE — LETTER
"12/18/2017       RE: Chitra Monet  [address]  [address]  [facility] [address]     Dear Colleague,    Thank you for referring your patient, Chitra Monet, to the [facility] DERMATOLOGY at [facility]. Please see a copy of my visit note below.    [facility] Dermatology Note      Dermatology Problem List:  1. Lichen planopilaris, ILK-10 today  2. ACD: patch testing 8/9/2017  mild positive reaction to nickel, linalool, methylisothiezolinone, sodium metabisulfite, methyldibromoglutaronitrile/phenoxyethanol, benzisothiazolinone, disperse dye mix, gold, p-methylaminophenol sulphate (Metol), and iodine. They determined that many of her products contained these ingredients and recommended that she use products off of the [place] safe list.    Encounter Date: Dec 18, 2017    CC:  Chief Complaint   Patient presents with     Hair Loss     Lichen planopilaris - \"It seems less itchy.\"     History of Present Illness:  Ms. Chitra Monet is a 62 year old female who presents today as follow up for Lichen planopilaris. She was last seen 9/2017: She is using Plaquenil 200 mg daily (lower dosing because she has had headaches with BID dosing). Denies headaches, changes in vision, abdominal distress. She is still shedding and having some occasional itching and tenderness in the scalp though does not have any of this today. Is otherwise feeling well today without other skin concerns. Is still interested in obtaining a full scalp prosthesis today.       Past Medical History:   Patient Active Problem List   Diagnosis     Lichen plano-pilaris     Dermatitis     Pruritus     Allergic dermatitis due to other chemical product     Past Medical History:   Diagnosis Date     Constipation     CHRONIC     Cough     CHRONIC     Migraine, unspecified, without mention of intractable migraine without mention of status migrainosus      Unspecified hypothyroidism      Past Surgical " History:   Procedure Laterality Date     BIOPSY      BREAST     COLONOSCOPY  6/6/2013    Procedure: COLONOSCOPY;  COLONOSCOPY;  Surgeon: Caridad Young MD;  Location: [facility]     COSMETIC SURGERY      bleph 2 weeks ago     ENT SURGERY      TONSILLECTOMY     GYN SURGERY      LAPAROSCOPY       Social History:  The patient works as a caregiver for seniors.    Family History:  There is no family history of hair loss. Father with ocular cicatricial pemphigoid. Brother with rosacea.    Medications:  Current Outpatient Prescriptions   Medication Sig Dispense Refill     NONFORMULARY One scalp prosthesis 1 each 1     tacrolimus (PROTOPIC) 0.1 % ointment Apply a thin layer to frontal scalp area before applying hair spray 60 g 1     fexofenadine (ALLEGRA ALLERGY) 180 MG tablet Take 1 tablet (180 mg) by mouth daily 60 tablet 3     hydroxychloroquine (PLAQUENIL) 200 MG tablet Take 1 tablet (200 mg) by mouth 2 times daily 60 tablet 1     clobetasol (TEMOVATE) 0.05 % external solution Apply topically as needed 50 mL 3     Thyroid (NATURE-THROID) 81.25 MG TABS Take by mouth daily       BuPROPion HCl (WELLBUTRIN PO) Take 50 mg by mouth daily       BIOTIN PO        albuterol (ALBUTEROL) 108 (90 BASE) MCG/ACT inhaler Inhale 2 puffs into the lungs as needed. Patient uses 4 days per week.       SUMAtriptan Succinate (IMITREX PO) Take 100 mg by mouth as needed.       Multiple Vitamin (MULTIVITAMINS PO) Take  by mouth daily.       Allergies   Allergen Reactions     Gold Salts Rash     Mild positive reaction to gold from derm patch testing      Iodine Rash     Mild positive reaction to derm patch testing     Linalool Rash     Mild positive reaction from derm patch testing     Methylisothiazolinone Rash     Mild positive reaction from derm patch testing and also to phenoxy ethanol     Nickel Rash     Derm patch test results= mild positive reaction     No Clinical Screening - See Comments Rash     Results of derm skin patch testing  :  Mild Positive reactions = Benzisothiazolinone , disperse dye mix  And P-methylaminophenol shlphat (metol).                                                              Doubtful reactions ( possible irritations )= Balsam of [place] and Balsam [place]     Shellac Rash     Doubtful reaction ( possible irritation) from derm patch testing results     Sodium Benzoate Rash     Doubtful reaction ( possible irritation) results from derm patch testing     Sodium Metabisulfite Rash     Mild positive reaction from derm patch testing       Review of Systems:  -Skin: As above in HPI. No additional skin concerns.    Physical exam:  Vitals: /84  Pulse 98  GEN: This is a well developed, well-nourished female in no acute distress, in a pleasant mood.    SKIN: Focused examination of the scalp was performed.  -Decreased hair density on parietal and vertex scalp, stable hair density on temporal scalp and frontal hairline.  -Decreased hair density occipital scalp, erythema and positive hair pull test.  -Erythema of frontotemporal hairline and temporal scalp.   Occipital scalp growth layers   1st: 3 cm, tapered  -No involvement of the eyebrows.  -Ridging of nails on bilateral hands.  -No other lesions of concern on areas examined.   LPPAI: 3.5    Impression/Plan:  1. 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 Given her number of contact allergens, reducing the number of products safe to use on her scalp, we think the low level laser light therapy may benefit her as she is still showing inflammation in the scalp.     Continue Plaquenil 200 mg one tablet once a day.    Safety labs ordered 12/18/17    LABS: CBC, LFT    Patient has eye exam soon.    Begin low laser light therapy; use three times  weekly    Continue clobetasol solution as needed for inflamed, itchy scalp.    Consider starting fluocinolone oil once week once confirmed there are no ingredients she is sensitive to    See below for Fluocinolone oil per Amneal brand ingredients    Allegra QAM given her itchy, sensitive skin; Benadryl nightly     ILK-10 1 ml today into occipital scalp into 10 locations        Follow-up in clinic in 3 months.      FLUOCINOLONE ACETONIDE (UNII: [id number]) (FLUOCINOLONE ACETONIDE - UNII:[id number]) FLUOCINOLONE ACETONIDE 0.11 mg in 118.28 mL     Inactive Ingredients   Ingredient Name Strength   ISOPROPYL ALCOHOL (UNII: [id number])    ISOPROPYL MYRISTATE (UNII: [id number])    LIGHT MINERAL OIL (UNII: [id number])    PEANUT OIL (UNII: [id number])    OLETH-2 (UNII: [id number]      Staff Involved:  Seen and staffed with Dr. Rossana Kraus MD  PGY4 Dermatology Resident    Patient was seen and examined with the dermatology resident. I agree with the history, review of systems, physical examination, assessments and plan. ILK injections were done together.     Rossana Quinn MD  Professor and  Chair  Department of Dermatology  [facility]    Pictures were placed in Pt's chart today for future reference.            Again, thank you for allowing me to participate in the care of your patient.      Sincerely,    Rossana Quinn MD

## 2017-12-18 NOTE — TELEPHONE ENCOUNTER
Last apt: 12/18/17  Future: none    Impression/Plan:  1. 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 Patient decreased Plaquenil to 200 mg QD, has changed hair products to hypoallergenic versions and is using clobetasol solution 2x/week. On exam today she has a large active area covering her occipital scalp.    Continue Plaquenil 200 mg one tablet once a day.    Safety labs ordered 6/19/17. Will check around December. Patient is making eye exam soon.    She is getting labs drawn next week: Vitamin D, Iron studies, Thyroid labs should be done.    Continue clobetasol solution as needed for inflamed, itchy scalp.    Will trial Allegra given her itchy, sensitive skin.         Phone follow-up 4 weeks.   Follow-up in clinic in 3 months.

## 2017-12-20 ENCOUNTER — TELEPHONE (OUTPATIENT)
Dept: DERMATOLOGY | Facility: CLINIC | Age: 62
End: 2017-12-20

## 2017-12-20 NOTE — TELEPHONE ENCOUNTER
Central Prior Authorization Team   Phone: [phone]      PA Initiation- man faxed     Medication: Fluocinolone Acetonide Scalp 0.01 % OIL oil -pa initaited   Insurance Company:  [employer]  Pharmacy Filling the Rx: [id number] - [place], [place] - [address] [place] [address]  Filling Pharmacy Phone: [phone]  Filling Pharmacy Fax:    Start Date: 12/20/2017

## 2017-12-21 NOTE — TELEPHONE ENCOUNTER
Central Prior Authorization Team   Phone: [phone]    Prior Authorization Approval    Authorization Effective Date: 12/19/2017  Authorization Expiration Date: 12/20/2018  Medication: Fluocinolone Acetonide Scalp 0.01 % OIL oil -pa approved    Insurance Company:  [employer]  Expected CoPay:   0.00     Which Pharmacy is filling the prescription (Not needed for infusion/clinic administered): [id number] - [place], [place] - [address] [place] [address]  Pharmacy Notified: Yes  Patient Notified: Yes

## 2018-01-15 ENCOUNTER — TELEPHONE (OUTPATIENT)
Dept: DERMATOLOGY | Facility: CLINIC | Age: 63
End: 2018-01-15

## 2018-01-15 NOTE — TELEPHONE ENCOUNTER
"Last apt: 12/18/17  Future apt: 2/27/18     Pharmacy states \"not sure of 24 hour or 12 hour. Patient requesting we ask for Rx from Dr. Quinn\".       Impression/Plan:  1. 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 Given her number of contact allergens, reducing the number of products safe to use on her scalp, we think the low level laser light therapy may benefit her as she is still showing inflammation in the scalp.     Continue Plaquenil 200 mg one tablet once a day.    Safety labs ordered 12/18/17    LABS: CBC, LFT    Patient has eye exam soon.    Begin low laser light therapy; use three times weekly    Continue clobetasol solution as needed for inflamed, itchy scalp.    Consider starting fluocinolone oil once week once confirmed there are no ingredients she is sensitive to    See below for Fluocinolone oil per Am[employer]al brand ingredients    Allegra QAM given her itchy, sensitive skin; Benadryl nightly     ILK-10 1 ml today into occipital scalp into 10 locations          Follow-up in clinic in 3 months.     "

## 2018-01-16 ENCOUNTER — TRANSFERRED RECORDS (OUTPATIENT)
Dept: HEALTH INFORMATION MANAGEMENT | Facility: CLINIC | Age: 63
End: 2018-01-16

## 2018-01-19 ENCOUNTER — TELEPHONE (OUTPATIENT)
Dept: DERMATOLOGY | Facility: CLINIC | Age: 63
End: 2018-01-19

## 2018-01-19 NOTE — TELEPHONE ENCOUNTER
Chitra called to let us know that she is going to be requesting Retina-A Micro-gel pump 0.10% 50g through [facility] and that we should be receiving a fax from them soon.

## 2018-01-25 ENCOUNTER — TELEPHONE (OUTPATIENT)
Dept: DERMATOLOGY | Facility: CLINIC | Age: 63
End: 2018-01-25

## 2018-01-25 RX ORDER — TRETINOIN 1 MG/G
GEL TOPICAL AT BEDTIME
Qty: 50 G | Refills: 3 | Status: CANCELLED | OUTPATIENT
Start: 2018-01-25

## 2018-01-25 NOTE — TELEPHONE ENCOUNTER
Last apt: 12/18/17   Future apt: 2/27/18     [facility] is requesting a refill for Retin A gel.     Impression/Plan:  1. 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 Given her number of contact allergens, reducing the number of products safe to use on her scalp, we think the low level laser light therapy may benefit her as she is still showing inflammation in the scalp.     Continue Plaquenil 200 mg one tablet once a day.    Safety labs ordered 12/18/17    LABS: CBC, LFT    Patient has eye exam soon.    Begin low laser light therapy; use three times weekly    Continue clobetasol solution as needed for inflamed, itchy scalp.    Consider starting fluocinolone oil once week once confirmed there are no ingredients she is sensitive to    See below for Fluocinolone oil per Amneal brand ingredients    Allegra QAM given her itchy, sensitive skin; Benadryl nightly     ILK-10 1 ml today into occipital scalp into 10 locations          Follow-up in clinic in 3 months.

## 2018-01-25 NOTE — TELEPHONE ENCOUNTER
"[facility] states \"not sure if 24 hour or 12 hour..patient requested we ask for Rx from Dr. Quinn\".     This is the 3rd request.     Neisha Mcdermott MA    "

## 2018-01-26 NOTE — TELEPHONE ENCOUNTER
Called pharmacy to [facility].    Prescribed 180 mg fexofenadene once daily for skin itching per clinic note.     No need for pseudoephedrine.    Jessie Bailey MD  PGY-4 Dermatology  Pager: [phone]

## 2018-01-30 NOTE — TELEPHONE ENCOUNTER
Called patient and asked her to call us back. Not sure where she wants the Retin A cream sent. There was no pharmacy selected.   Neisha Mcdermott MA

## 2018-02-03 DIAGNOSIS — L30.9 DERMATITIS: ICD-10-CM

## 2018-02-03 DIAGNOSIS — L66.10 LICHEN PLANO-PILARIS: ICD-10-CM

## 2018-02-05 RX ORDER — HYDROXYCHLOROQUINE SULFATE 200 MG/1
TABLET, FILM COATED ORAL
Qty: 60 TABLET | Refills: 0 | Status: SHIPPED | OUTPATIENT
Start: 2018-02-05 | End: 2018-04-16

## 2018-02-05 NOTE — TELEPHONE ENCOUNTER
Refill request received  for plaquenil 200mg tab. Dr. Quinn's notes reviewed. Routing to [facility] for consideration.    Sierra Francis RN    LOV 12/18/17  F/u 2/27/18  Labs done 12/18    Impression/Plan:  1. 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 Given her number of contact allergens, reducing the number of products safe to use on her scalp, we think the low level laser light therapy may benefit her as she is still showing inflammation in the scalp.     Continue Plaquenil 200 mg one tablet once a day.    Safety labs ordered 12/18/17    LABS: CBC, LFT    Patient has eye exam soon.    Begin low laser light therapy; use three times weekly    Continue clobetasol solution as needed for inflamed, itchy scalp.    Consider starting fluocinolone oil once week once confirmed there are no ingredients she is sensitive to    See below for Fluocinolone oil per Amneal brand ingredients    Allegra QAM given her itchy, sensitive skin; Benadryl nightly     ILK-10 1 ml today into occipital scalp into 10 locations          Follow-up in clinic in 3 months.

## 2018-02-05 NOTE — TELEPHONE ENCOUNTER
Medication refill request received and reviewed. Patient requesting refill of Plaquenil 200mg daily.  Last visit from Dr. Quinn was reviewed. At that point, plan was to continue on this medication through planned 3 month f/u. Safety labs reviewed and wnl. Reassuring.   Will refill medication as previously prescribed with enough refills to bridge to f/u appointment on 2/27.     Abel Leone MD  PGY3 Dermatology  [phone]

## 2018-02-06 DIAGNOSIS — L66.10 LICHEN PLANO-PILARIS: ICD-10-CM

## 2018-02-06 DIAGNOSIS — L30.9 DERMATITIS: ICD-10-CM

## 2018-02-06 RX ORDER — HYDROXYCHLOROQUINE SULFATE 200 MG/1
TABLET, FILM COATED ORAL
Qty: 60 TABLET | Refills: 0 | OUTPATIENT
Start: 2018-02-06

## 2018-03-12 ENCOUNTER — OFFICE VISIT (OUTPATIENT)
Dept: DERMATOLOGY | Facility: CLINIC | Age: 63
End: 2018-03-12
Payer: COMMERCIAL

## 2018-03-12 VITALS — DIASTOLIC BLOOD PRESSURE: 83 MMHG | HEART RATE: 90 BPM | SYSTOLIC BLOOD PRESSURE: 132 MMHG

## 2018-03-12 DIAGNOSIS — L66.10 LICHEN PLANO-PILARIS: Primary | ICD-10-CM

## 2018-03-12 DIAGNOSIS — L23.5 ALLERGIC DERMATITIS DUE TO OTHER CHEMICAL PRODUCT: ICD-10-CM

## 2018-03-12 ASSESSMENT — PAIN SCALES - GENERAL: PAINLEVEL: NO PAIN (0)

## 2018-03-12 NOTE — NURSING NOTE
Dermatology Rooming Note    Chitra Monet's goals for this visit include:   Chief Complaint   Patient presents with     Hair Loss     Chitra is here today for a hair loss follow up- Chitra feels like there is more loss on the top of her head.      Neisha Mcdermott MA

## 2018-03-12 NOTE — LETTER
3/12/2018       RE: Chitra Monet  [address]  [address]  [facility] [address]     Dear Colleague,    Thank you for referring your patient, Chitra Monet, to the [facility] DERMATOLOGY at [facility]. Please see a copy of my visit note below.    [facility] Dermatology Note      Dermatology Problem List:  1. Lichen planopilaris, ILK- 10 today  2. ACD: patch testing 8/9/2017  mild positive reaction to nickel, linalool, methylisothiezolinone, sodium metabisulfite, methyldibromoglutaronitrile/phenoxyethanol, benzisothiazolinone, disperse dye mix, gold, p-methylaminophenol sulphate (Metol), and iodine. It was  determined that many of her products contained these ingredients and recommended that she use products off of the [place] safe list.    Encounter Date: Mar 12, 2018    CC:  Chief Complaint   Patient presents with     Hair Loss     Chitra is here today for a hair loss follow up- Chitra feels like there is more loss on the top of her head.        History of Present Illness:  Ms. Chitra Monet is a 62 year old female who presents as a follow-up for lichen planopilaris. The patient was last seen 12/18/2017 when she received 10 IL Kenalog 10 injections to the scalp which she tolerated well. Since that time, she believes there is increased thinning on the vertex scalp. Low level laser light therapy was given as an option, but she has tried that in the past without improvement. She uses Clobetasol solution about 1-2x week, which helps relieve itching and redness. She started using Fluocinolone oil for her allergic contact dermatitis (history of several contact allergies) which helped considerably with decreasing scale. . She only applied it once and had significant relief. She uses Allegra QAM and Benadryl QHS which seems to help itching/sensitive skin. Today, she endorses no itching, pain, or burning. She otherwise feels well and voices no additional  concerns.       Past Medical History:   Patient Active Problem List   Diagnosis     Lichen plano-pilaris     Dermatitis     Pruritus     Allergic dermatitis due to other chemical product     Past Medical History:   Diagnosis Date     Constipation     CHRONIC     Cough     CHRONIC     Migraine, unspecified, without mention of intractable migraine without mention of status migrainosus      Unspecified hypothyroidism      Past Surgical History:   Procedure Laterality Date     BIOPSY      BREAST     COLONOSCOPY  6/6/2013    Procedure: COLONOSCOPY;  COLONOSCOPY;  Surgeon: Caridad Young MD;  Location: [facility]     COSMETIC SURGERY      bleph 2 weeks ago     ENT SURGERY      TONSILLECTOMY     GYN SURGERY      LAPAROSCOPY       Social History:  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    Family History:  There is no family history of hair loss. There is no family history of skin cancer.     Medications:  Current Outpatient Prescriptions   Medication Sig Dispense Refill     hydroxychloroquine (PLAQUENIL) 200 MG tablet Take 1 tablet (200 mg) by mouth 2 times daily 60 tablet 0     NONFORMULARY One scalp prosthesis 1 each 1     NONFORMULARY One full scalp cranial prosthesis 1 each 5     NONFORMULARY One low level laser light 1 each 2     Fluocinolone Acetonide Scalp 0.01 % OIL oil Apply topically once a week Use one capful on affected areas of scalp at bedtime once weekly, may increase to twice weekly as needed 118 mL 1     tacrolimus (PROTOPIC) 0.1 % ointment Apply a thin layer to frontal scalp area before applying hair spray 60 g 1     fexofenadine (ALLEGRA ALLERGY) 180 MG tablet Take 1 tablet (180 mg) by mouth daily 60 tablet 3     clobetasol (TEMOVATE) 0.05 % external solution Apply topically as needed 50 mL 3     Thyroid (NATURE-THROID) 81.25 MG TABS Take by mouth daily       BuPROPion HCl (WELLBUTRIN PO) Take 50 mg by mouth daily       BIOTIN PO        albuterol (ALBUTEROL)  108 (90 BASE) MCG/ACT inhaler Inhale 2 puffs into the lungs as needed. Patient uses 4 days per week.       SUMAtriptan Succinate (IMITREX PO) Take 100 mg by mouth as needed.       Multiple Vitamin (MULTIVITAMINS PO) Take  by mouth daily.       Allergies   Allergen Reactions     Gold Salts Rash     Mild positive reaction to gold from derm patch testing      Iodine Rash     Mild positive reaction to derm patch testing     Linalool Rash     Mild positive reaction from derm patch testing     Methylisothiazolinone Rash     Mild positive reaction from derm patch testing and also to phenoxy ethanol     Nickel Rash     Derm patch test results= mild positive reaction     No Clinical Screening - See Comments Rash     Results of derm skin patch testing :  Mild Positive reactions = Benzisothiazolinone , disperse dye mix  And P-methylaminophenol shlphat (metol).                                                              Doubtful reactions ( possible irritations )= Balsam of [place] and Balsam [place]     Shellac Rash     Doubtful reaction ( possible irritation) from derm patch testing results     Sodium Benzoate Rash     Doubtful reaction ( possible irritation) results from derm patch testing     Sodium Metabisulfite Rash     Mild positive reaction from derm patch testing         Review of Systems:  -Constitutional: The patient denies fatigue, fevers, chills, unintended weight loss, and night sweats.  -HEENT: Patient denies nonhealing oral sores.  -Skin: As above in HPI. No additional skin concerns.    Physical exam:  Vitals: /83 (Cuff Size: Adult Regular)  Pulse 90  GEN: This is a well developed, well-nourished female in no acute distress, in a pleasant mood.    SKIN: Focused examination of the face and scalp was performed.  -Decreased hair density of vertex and parietal scalp.   -Vertex scalp with mild perifollicular erythema and moderate scaling.   -Remainder of scalp with mild perifollicular erythema and mild scaling.    -Crusting in midline occipital scalp.   -No other lesions of concern on areas examined.   LPPAI: 0.67      Impression/Plan:  1. Lichen planopilaris, associated with irritant versus allergic contact dermatitis s/p patch testing 8/11/2017. LPPAI 0.67, improved compared to 12/18/2017    Continue Plaquenil 200mg tablet qDay - CBC & LFT normal 12/2017; had normal eye exam recently and follow up with ophthalmology in 1 year.     Continue Clobetasol 0.05% solution prn itching/redness.     Continue Fluocinolone oil once weekly.     Continue Allegra QAM and Benadryl QHS prn sensitive/itchy skin    Declined retrying laser light therapy - provided no improvement in the past     Kenalog intralesional injection procedure note : After verbal consent and discussion of risks including but not limited to atrophy, pain, and bruising,  time out was performed, 2 total cc's of Kenalog 10 mg/cc was injected into 20 sites on the vertex and posterior scalp.  The patient tolerated the procedure well and left the Dermatology clinic in good condition.    Follow-up in 3 months, earlier for new or changing lesions.     Staff Involved:  Scribed by Lance Martinez, MS4 for Dr. Quinn.      I agree with the PFSH and ROS as completed by the Medical Student. The remainder of the encounter was performed by me and scribed by the Medical Student. The scribed note accurately reflects my personal services and the medical decisions made by me. ILK injections were done together.       Rossana Quinn MD  Professor and Chair  Department of Dermatology  [facility]                            Again, thank you for allowing me to participate in the care of your patient.      Sincerely,    Rossana Quinn MD

## 2018-03-12 NOTE — LETTER
March 12, 2018      Ms. Chitra Monet is a 61 year old female who was referred for evaluation by Dr. Fransisca Arreola. She presented with a history of biopsy-demonstrated lichen planopilaris, previously treated with betamethasone solution and clobetasol solution, as well as ketoconazole shampoo. Despite use of the betamethasone solution she continued to have hair loss with scalp tingling and itch with marked generalized and perifollicular erythema. She was prescribed plaquenil 200 mg twice a day as well as topical gabapentin to address the scalp symptoms.      Ms. Zavala was also seen by an allergist/immunologist who was concerned that her scalp symptom were indicative of contact allergy and  did patch testing which revealed allergy to disperse blue dye 124/106.  We then requested additional allergy testing  She saw Dr. Chan on 8/9/17 - 8/11/17 in the occupational and contact dermatitis clinic and underwent extensive patch testing that showed she had mild positive reactions to the following: Nickel, linalool, methylisothiezolinone, sodium metabisulfite, methyldibromoglutaronitrile/phenoxyethanol, benzisothiazolinone, disperse dye mix, gold, p-methylaminophenol sulphate (Metol), and iodine     It was determined that many of her products contained these ingredients and it was recommended that she use only products that do not contain these chemicals..      Despite all these measures, Ms. Monet continues to struggle with this disease. Therefore, as treatment continues and the focus is on reducing scalp inflammation, wearing a scalp prosthesis can be very helpful. Therefore, I strongly support her request for a full cranial prosthesis as part of the management of her lichen plano-pilaris.     Sincerely,           Dr. Rossana Quinn  Professor and Chair, Department of Dermatology

## 2018-03-12 NOTE — PROGRESS NOTES
[facility] Dermatology Note      Dermatology Problem List:  1. Lichen planopilaris, ILK- 10 today  2. ACD: patch testing 8/9/2017  mild positive reaction to nickel, linalool, methylisothiezolinone, sodium metabisulfite, methyldibromoglutaronitrile/phenoxyethanol, benzisothiazolinone, disperse dye mix, gold, p-methylaminophenol sulphate (Metol), and iodine. It was  determined that many of her products contained these ingredients and recommended that she use products off of the [facility] safe list.    Encounter Date: Mar 12, 2018    CC:  Chief Complaint   Patient presents with     Hair Loss     Chitra is here today for a hair loss follow up- Chitra feels like there is more loss on the top of her head.        History of Present Illness:  Ms. Chitra Monet is a 62 year old female who presents as a follow-up for lichen planopilaris. The patient was last seen 12/18/2017 when she received 10 IL Kenalog 10 injections to the scalp which she tolerated well. Since that time, she believes there is increased thinning on the vertex scalp. Low level laser light therapy was given as an option, but she has tried that in the past without improvement. She uses Clobetasol solution about 1-2x week, which helps relieve itching and redness. She started using Fluocinolone oil for her allergic contact dermatitis (history of several contact allergies) which helped considerably with decreasing scale. . She only applied it once and had significant relief. She uses Allegra QAM and Benadryl QHS which seems to help itching/sensitive skin. Today, she endorses no itching, pain, or burning. She otherwise feels well and voices no additional concerns.       Past Medical History:   Patient Active Problem List   Diagnosis     Lichen plano-pilaris     Dermatitis     Pruritus     Allergic dermatitis due to other chemical product     Past Medical History:   Diagnosis Date     Constipation     CHRONIC     Cough     CHRONIC     Migraine,  unspecified, without mention of intractable migraine without mention of status migrainosus      Unspecified hypothyroidism      Past Surgical History:   Procedure Laterality Date     BIOPSY      BREAST     COLONOSCOPY  6/6/2013    Procedure: COLONOSCOPY;  COLONOSCOPY;  Surgeon: Caridad Young MD;  [facility]: [facility]     COSMETIC SURGERY      bleph 2 weeks ago     ENT SURGERY      TONSILLECTOMY     GYN SURGERY      LAPAROSCOPY       Social History:  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    Family History:  There is no family history of hair loss. There is no family history of skin cancer.     Medications:  Current Outpatient Prescriptions   Medication Sig Dispense Refill     hydroxychloroquine (PLAQUENIL) 200 MG tablet Take 1 tablet (200 mg) by mouth 2 times daily 60 tablet 0     NONFORMULARY One scalp prosthesis 1 each 1     NONFORMULARY One full scalp cranial prosthesis 1 each 5     NONFORMULARY One low level laser light 1 each 2     Fluocinolone Acetonide Scalp 0.01 % OIL oil Apply topically once a week Use one capful on affected areas of scalp at bedtime once weekly, may increase to twice weekly as needed 118 mL 1     tacrolimus (PROTOPIC) 0.1 % ointment Apply a thin layer to frontal scalp area before applying hair spray 60 g 1     fexofenadine (ALLEGRA ALLERGY) 180 MG tablet Take 1 tablet (180 mg) by mouth daily 60 tablet 3     clobetasol (TEMOVATE) 0.05 % external solution Apply topically as needed 50 mL 3     Thyroid (NATURE-THROID) 81.25 MG TABS Take by mouth daily       BuPROPion HCl (WELLBUTRIN PO) Take 50 mg by mouth daily       BIOTIN PO        albuterol (ALBUTEROL) 108 (90 BASE) MCG/ACT inhaler Inhale 2 puffs into the lungs as needed. Patient uses 4 days per week.       SUMAtriptan Succinate (IMITREX PO) Take 100 mg by mouth as needed.       Multiple Vitamin (MULTIVITAMINS PO) Take  by mouth daily.       Allergies   Allergen Reactions     Gold Salts Rash      Mild positive reaction to gold from derm patch testing      Iodine Rash     Mild positive reaction to derm patch testing     Linalool Rash     Mild positive reaction from derm patch testing     Methylisothiazolinone Rash     Mild positive reaction from derm patch testing and also to phenoxy ethanol     Nickel Rash     Derm patch test results= mild positive reaction     No Clinical Screening - See Comments Rash     Results of derm skin patch testing :  Mild Positive reactions = Benzisothiazolinone , disperse dye mix  And P-methylaminophenol shlphat (metol).                                                              Doubtful reactions ( possible irritations )= Balsam of [place] and Balsam Shreif     Shellac Rash     Doubtful reaction ( possible irritation) from derm patch testing results     Sodium Benzoate Rash     Doubtful reaction ( possible irritation) results from derm patch testing     Sodium Metabisulfite Rash     Mild positive reaction from derm patch testing         Review of Systems:  -Constitutional: The patient denies fatigue, fevers, chills, unintended weight loss, and night sweats.  -HEENT: Patient denies nonhealing oral sores.  -Skin: As above in HPI. No additional skin concerns.    Physical exam:  Vitals: /83 (Cuff Size: Adult Regular)  Pulse 90  GEN: This is a well developed, well-nourished female in no acute distress, in a pleasant mood.    SKIN: Focused examination of the face and scalp was performed.  -Decreased hair density of vertex and parietal scalp.   -Vertex scalp with mild perifollicular erythema and moderate scaling.   -Remainder of scalp with mild perifollicular erythema and mild scaling.   -Crusting in midline occipital scalp.   -No other lesions of concern on areas examined.   LPPAI: 0.67      Impression/Plan:  1. Lichen planopilaris, associated with irritant versus allergic contact dermatitis s/p patch testing 8/11/2017. LPPAI 0.67, improved compared to 12/18/2017    Continue  Plaquenil 200mg tablet qDay - CBC & LFT normal 12/2017; had normal eye exam recently and follow up with ophthalmology in 1 year.     Continue Clobetasol 0.05% solution prn itching/redness.     Continue Fluocinolone oil once weekly.     Continue Allegra QAM and Benadryl QHS prn sensitive/itchy skin    Declined retrying laser light therapy - provided no improvement in the past     Kenalog intralesional injection procedure note : After verbal consent and discussion of risks including but not limited to atrophy, pain, and bruising,  time out was performed, 2 total cc's of Kenalog 10 mg/cc was injected into 20 sites on the vertex and posterior scalp.  The patient tolerated the procedure well and left the Dermatology clinic in good condition.    Follow-up in 3 months, earlier for new or changing lesions.     Staff Involved:  Scribed by Lance Martinez, MS4 for Dr. Quinn.      I agree with the PFSH and ROS as completed by the Medical Student. The remainder of the encounter was performed by me and scribed by the Medical Student. The scribed note accurately reflects my personal services and the medical decisions made by me. ILK injections were done together.       Rossana Quinn MD  Professor and Chair  Department of Dermatology  [facility]

## 2018-03-12 NOTE — MR AVS SNAPSHOT
After Visit Summary   3/12/2018    hCitra Monet    MRN: [id number]           Patient Information     Date Of Birth          1955        Visit Information        Provider Department      3/12/2018 11:30 AM Rossana Quinn MD [facility] Dermatology        Today's Diagnoses     Lichen plano-pilaris    -  1    Allergic dermatitis due to other chemical product           Follow-ups after your visit        Who to contact     Please call your clinic at [phone] to:    Ask questions about your health    Make or cancel appointments    Discuss your medicines    Learn about your test results    Speak to your doctor            Additional Information About Your Visit        MyChart Information     [employer] gives you secure access to your electronic health record. If you see a primary care provider, you can also send messages to your care team and make appointments. If you have questions, please call your primary care clinic.  If you do not have a primary care provider, please call [phone] and they will assist you.      [employer] is an electronic gateway that provides easy, online access to your medical records. With [employer], you can request a clinic appointment, read your test results, renew a prescription or communicate with your care team.     To access your existing account, please contact your [facility] or call [phone] for assistance.        Care EveryWhere ID     This is your Care EveryWhere ID. This could be used by other organizations to access your [facility] medical records  [id number]        Your Vitals Were     Pulse                   90            Blood Pressure from Last 3 Encounters:   03/12/18 132/83   12/18/17 135/84   09/18/17 121/70    Weight from Last 3 Encounters:   06/06/13 71.7 kg (158 lb)              We Performed the Following     INJECTION INTO SKIN LESIONS >7          Today's Medication Changes          These changes are accurate  as of 3/12/18 11:59 PM.  If you have any questions, ask your nurse or doctor.               Start taking these medicines.        Dose/Directions    triamcinolone acetonide 10 MG/ML injection   Commonly known as:  KENALOG   Used for:  Lichen plano-pilaris   Started by:  Rossana Quinn MD        See med note   Quantity:  5 mL   Refills:  0         These medicines have changed or have updated prescriptions.        Dose/Directions    * NONFORMULARY   This may have changed:  Another medication with the same name was added. Make sure you understand how and when to take each.   Used for:  Loss of hair   Changed by:  Rossana Quinn MD        One scalp prosthesis   Quantity:  1 each   Refills:  1       * NONFORMULARY   This may have changed:  Another medication with the same name was added. Make sure you understand how and when to take each.   Used for:  Lichen plano-pilaris   Changed by:  Rossana Quinn MD        One full scalp cranial prosthesis   Quantity:  1 each   Refills:  5       * NONFORMULARY   This may have changed:  Another medication with the same name was added. Make sure you understand how and when to take each.   Used for:  Lichen plano-pilaris   Changed by:  Rossana Quinn MD        One low level laser light   Quantity:  1 each   Refills:  2       * NONFORMULARY   This may have changed:  You were already taking a medication with the same name, and this prescription was added. Make sure you understand how and when to take each.   Used for:  Lichen plano-pilaris   Changed by:  Rossana Quinn MD        One scalp prosthesis for scalp hair loss, lichen plano pilaris   Quantity:  1 each   Refills:  5       * Notice:  This list has 4 medication(s) that are the same as other medications prescribed for you. Read the directions carefully, and ask your doctor or other care provider to review them with you.         Where to get your medicines      Some  of these will need a paper prescription and others can be bought over the counter.  Ask your nurse if you have questions.     Bring a paper prescription for each of these medications     NONFORMULARY       You don't need a prescription for these medications     triamcinolone acetonide 10 MG/ML injection                Primary Care Provider Office Phone # Fax #    Amy Garcia [phone]       [facility] [address]  [facility] [address]        Equal Access to Services     PRESLEY CRUZ : Hadii aad ku hadasho Soomaali, waaxda luqadaha, qaybta kaalmada adeegyada, waxay idiin hayaan aderubio carrasquillo . [facility] [phone].    ATENCIÓN: Si habla español, tiene a bowling disposición servicios gratuitos de asistencia lingüística. [facility] [phone].    We comply with applicable federal civil rights laws and [place] laws. We do not discriminate on the basis of race, color, national origin, age, disability, sex, sexual orientation, or gender identity.            Thank you!     Thank you for choosing [facility]  for your care. Our goal is always to provide you with excellent care. Hearing back from our patients is one way we can continue to improve our services. Please take a few minutes to complete the written survey that you may receive in the mail after your visit with us. Thank you!             Your Updated Medication List - Protect others around you: Learn how to safely use, store and throw away your medicines at www.disposemymeds.org.          This list is accurate as of 3/12/18 11:59 PM.  Always use your most recent med list.                   Brand Name Dispense Instructions for use Diagnosis    BIOTIN PO           clobetasol 0.05 % external solution    TEMOVATE    50 mL    Apply topically as needed    Lichen plano-pilaris, Dermatitis       fexofenadine 180 MG tablet    ALLEGRA ALLERGY    60 tablet    Take 1 tablet (180 mg) by mouth daily    Pruritus        Fluocinolone Acetonide Scalp 0.01 % Oil oil     118 mL    Apply topically once a week Use one capful on affected areas of scalp at bedtime once weekly, may increase to twice weekly as needed    Lichen plano-pilaris       hydroxychloroquine 200 MG tablet    PLAQUENIL    60 tablet    Take 1 tablet (200 mg) by mouth 2 times daily    Lichen plano-pilaris, Dermatitis       IMITREX PO      Take 100 mg by mouth as needed.        MULTIVITAMINS PO      Take  by mouth daily.        NATURE-THROID 81.25 MG Tabs   Generic drug:  Thyroid      Take by mouth daily        * NONFORMULARY     1 each    One scalp prosthesis    Loss of hair       * NONFORMULARY     1 each    One full scalp cranial prosthesis    Lichen plano-pilaris       * NONFORMULARY     1 each    One low level laser light    Lichen plano-pilaris       * NONFORMULARY     1 each    One scalp prosthesis for scalp hair loss, lichen plano pilaris    Lichen plano-pilaris       PROAIR  (90 BASE) MCG/ACT Inhaler   Generic drug:  albuterol      Inhale 2 puffs into the lungs as needed. Patient uses 4 days per week.        tacrolimus 0.1 % ointment    PROTOPIC    60 g    Apply a thin layer to frontal scalp area before applying hair spray    Dermatitis       triamcinolone acetonide 10 MG/ML injection    KENALOG    5 mL    See med note    Lichen plano-pilaris       WELLBUTRIN PO      Take 50 mg by mouth daily        * Notice:  This list has 4 medication(s) that are the same as other medications prescribed for you. Read the directions carefully, and ask your doctor or other care provider to review them with you.

## 2018-03-12 NOTE — NURSING NOTE
The following medication was given:     MEDICATION:  Kenalog 10 mg  ROUTE: ID  SITE: Scalp  DOSE: 50mg per 5mL  LOT #: [id number]  : New KCBX  EXPIRATION DATE: 04/2019  NDC#: 1975-0935-64   Was there drug waste? Yes  Amount of drug waste (mL): 3.  Reason for waste:  As per MD Neisha Mcdermott, [facility]  March 12, 2018

## 2018-04-16 ENCOUNTER — TELEPHONE (OUTPATIENT)
Dept: DERMATOLOGY | Facility: CLINIC | Age: 63
End: 2018-04-16

## 2018-04-16 DIAGNOSIS — L30.9 DERMATITIS: ICD-10-CM

## 2018-04-16 DIAGNOSIS — L66.10 LICHEN PLANO-PILARIS: ICD-10-CM

## 2018-04-16 NOTE — TELEPHONE ENCOUNTER
[facility] Call Center    Phone Message    May a detailed message be left on voicemail: yes    Reason for Call: Medication Refill Request    Has the patient contacted the pharmacy for the refill? Yes   Name of medication being requested: Plaquenil  Provider who prescribed the medication: Sulema  Pharmacy: [facility] in [place]  Date medication is needed: asap- patient only has one days worth left       Action Taken: Message routed to:  [facility] Dermatology

## 2018-04-17 RX ORDER — HYDROXYCHLOROQUINE SULFATE 200 MG/1
TABLET, FILM COATED ORAL
Qty: 60 TABLET | Refills: 3 | Status: SHIPPED | OUTPATIENT
Start: 2018-04-17

## 2018-05-25 DIAGNOSIS — L30.9 DERMATITIS: Primary | ICD-10-CM

## 2018-05-25 RX ORDER — KETOCONAZOLE 20 MG/ML
SHAMPOO TOPICAL
Qty: 120 ML | Refills: 11 | Status: SHIPPED | OUTPATIENT
Start: 2018-05-25 | End: 2019-06-14

## 2018-07-09 ENCOUNTER — OFFICE VISIT (OUTPATIENT)
Dept: DERMATOLOGY | Facility: CLINIC | Age: 63
End: 2018-07-09
Payer: COMMERCIAL

## 2018-07-09 VITALS — HEART RATE: 86 BPM | DIASTOLIC BLOOD PRESSURE: 67 MMHG | SYSTOLIC BLOOD PRESSURE: 125 MMHG

## 2018-07-09 DIAGNOSIS — L23.5 ALLERGIC DERMATITIS DUE TO OTHER CHEMICAL PRODUCT: ICD-10-CM

## 2018-07-09 DIAGNOSIS — L66.10 LICHEN PLANO-PILARIS: Primary | ICD-10-CM

## 2018-07-09 DIAGNOSIS — L30.9 DERMATITIS: ICD-10-CM

## 2018-07-09 ASSESSMENT — PAIN SCALES - GENERAL: PAINLEVEL: NO PAIN (0)

## 2018-07-09 NOTE — PROGRESS NOTES
[facility] Dermatology Note      Dermatology Problem List:  1. Lichen planopilaris  Current treatment:  -ketoconazole shampoo 2x/wk  -clobetasol 0.05% solution as needed for pruritus  -patient uses Quercetin and Bromelain supplements orally for pruritus  Past treatment:  -plaquenil 200 mg daily (d/c July 2018)  -LLLT  -Benadryl and Allegra for pruritus  -fluocinolone oil  -gabapentin    2. ACD: patch testing 8/9/2017  mild positive reaction to nickel, linalool, methylisothiezolinone, sodium metabisulfite, methyldibromoglutaronitrile/phenoxyethanol, benzisothiazolinone, disperse dye mix, gold, p-methylaminophenol sulphate (Metol), and iodine. It was  determined that many of her products contained these ingredients and recommended that she use products off of the [place] safe list.    Encounter Date: Jul 9, 2018    CC:  Chief Complaint   Patient presents with     Hair Loss     Chitra is here today for her two month follow up, patient states she is feeling better even after stopping plaqeunil a week and a half a go per patient.          History of Present Illness:  Ms. Chitra Monet is a 63 year old female who presents as a follow-up for lichen planopilaris. The patient was last seen 3/12/2018 when she felt that her vertex scalp had been thinning. In the past she had tried using LLLT which she did not wish to continue. She also has tried gabapentin in the past and did not feel that it helped. A week and a half ago, she discontinued her use of Plaquenil as she felt it was leading to redness of her scalp. She does not wish to restart use of Plaquenil even at a less frequent dose. She is happy that she was able to get a wig that she may use if necessary if her condition worsens. She shampoos her hair about once every four days, using ketoconazole shampoo. She uses clobetasol 0.05% solution as needed for pruritus of the scalp, which occurs about once weekly. She has not been using fluocinolone oil,  Allegra, or Benadryl. For her general pruritus, she uses Quercetin and Bromelain supplements, which she takes orally. She is happy about the status of her hair now, and feels that it has improved, especially that the redness of her scalp has gone away. The patient denies any burning, numbness, and only experiences a little flaking. The patient states that she has had no changes in medication or medical history since she was last seen in March 2018. The patient also mentioned that she has a lesion on her R middle finger that has not healed in four years. The patient is otherwise feeling well.    Past Medical History:   Patient Active Problem List   Diagnosis     Lichen plano-pilaris     Dermatitis     Pruritus     Allergic dermatitis due to other chemical product     Past Medical History:   Diagnosis Date     Constipation     CHRONIC     Cough     CHRONIC     Migraine, unspecified, without mention of intractable migraine without mention of status migrainosus      Unspecified hypothyroidism      Past Surgical History:   Procedure Laterality Date     BIOPSY      BREAST     COLONOSCOPY  6/6/2013    Procedure: COLONOSCOPY;  COLONOSCOPY;  Surgeon: Caridad Young MD;  Location: [facility]     COSMETIC SURGERY      bleph 2 weeks ago     ENT SURGERY      TONSILLECTOMY     GYN SURGERY      LAPAROSCOPY       Social History:  Social history was not asked at this visit.    Family History:  Patient states her father had male pattern baldness in his 80s.    Medications:  Current Outpatient Prescriptions   Medication Sig Dispense Refill     albuterol (ALBUTEROL) 108 (90 BASE) MCG/ACT inhaler Inhale 2 puffs into the lungs as needed. Patient uses 4 days per week.       BIOTIN PO        BuPROPion HCl (WELLBUTRIN PO) Take 50 mg by mouth daily       clobetasol (TEMOVATE) 0.05 % external solution Apply topically as needed 50 mL 3     fexofenadine (ALLEGRA ALLERGY) 180 MG tablet Take 1 tablet (180 mg) by mouth daily 60 tablet 3      Fluocinolone Acetonide Scalp 0.01 % OIL oil Apply topically once a week Use one capful on affected areas of scalp at bedtime once weekly, may increase to twice weekly as needed 118 mL 1     ketoconazole (NIZORAL) 2 % shampoo Apply to scalp, lather, massage into skin, leave on for 2-3 minutes, then rinse, do up to 5 times per week 120 mL 11     Multiple Vitamin (MULTIVITAMINS PO) Take  by mouth daily.       NONFORMULARY One scalp prosthesis for scalp hair loss, lichen plano pilaris 1 each 5     NONFORMULARY One scalp prosthesis 1 each 1     NONFORMULARY One full scalp cranial prosthesis 1 each 5     NONFORMULARY One low level laser light 1 each 2     SUMAtriptan Succinate (IMITREX PO) Take 100 mg by mouth as needed.       tacrolimus (PROTOPIC) 0.1 % ointment Apply a thin layer to frontal scalp area before applying hair spray 60 g 1     Thyroid (NATURE-THROID) 81.25 MG TABS Take by mouth daily       hydroxychloroquine (PLAQUENIL) 200 MG tablet Take 1 tablet (200 mg) by mouth 2 times daily (Patient not taking: Reported on 7/9/2018) 60 tablet 3     Allergies   Allergen Reactions     Gold Salts Rash     Mild positive reaction to gold from derm patch testing      Iodine Rash     Mild positive reaction to derm patch testing     Linalool Rash     Mild positive reaction from derm patch testing     Methylisothiazolinone Rash     Mild positive reaction from derm patch testing and also to phenoxy ethanol     Nickel Rash     Derm patch test results= mild positive reaction     No Clinical Screening - See Comments Rash     Results of derm skin patch testing :  Mild Positive reactions = Benzisothiazolinone , disperse dye mix  And P-methylaminophenol shlphat (metol).                                                              Doubtful reactions ( possible irritations )= Balsam of [place] and Balsam Sherif     Shellac Rash     Doubtful reaction ( possible irritation) from derm patch testing results     Sodium Benzoate Rash     Doubtful  reaction ( possible irritation) results from derm patch testing     Sodium Metabisulfite Rash     Mild positive reaction from derm patch testing         Review of Systems:  -As per HPI  -Constitutional: The patient denies fatigue, fevers, chills, unintended weight loss, and night sweats.  -HEENT: Patient denies nonhealing oral sores.  -Skin: As above in HPI. No additional skin concerns.    Physical exam:  Vitals: /67 (BP Location: Right arm, Patient Position: Sitting, Cuff Size: Adult Regular)  Pulse 86  GEN: This is a well developed, well-nourished female in no acute distress, in a pleasant mood.    SKIN: Focused examination of the scalp was performed. Lichen planopilaris is improved with regrowth in vertex and front hairline. General scale present on vertex.  -Regrowth layers: 1-2, 4-5, 8-9.  -LPPAI 0.67 (1 for perifollicular scale, 1 for perifollicular erythema, both in vertex).  -Part 1.2  -No general erythema, much improved from previous visits as seen in photographs.  -No other lesions of concern on areas examined.     Impression/Plan:  1. Lichen planopilaris - improved, regrowth of frontal hairline, parietal regions.    Continue ketoconazole 2% shampoo use 2x/wk.    Continue clobetasol 0.05% solution to use as needed for scalp pruritus.    Discussed it is okay for patient to discontinue use of plaquenil, okay to discontinue Derma-smoothe (patient had used it 2x total in past).    Discussed it is okay for patient to use Quercetin and Bromelain supplements rather than Benadryl and Allegra for pruritus as this is her preference and she finds this helpful    Patient pleased with current status and improvement of her condition, will follow up in 4 months or as needed.      Follow-up in 4 months, earlier for new or changing lesions.     Staff Involved:  Scribed by TAYLOR Amezcua for Dr. Rossana Quinn.            I agree with the PFSH and ROS as completed by the Medical Student. The remainder of the  encounter was performed by me and scribed by the Medical Student. The scribed note accurately reflects my personal services and the medical decisions made by me.      Rossana Quinn MD  Professor and Chair  Department of Dermatology  [facility]

## 2018-07-09 NOTE — LETTER
7/9/2018       RE: Chitra Monet  [address]  [address]  [facility] [address]     Dear Colleague,    Thank you for referring your patient, Chitra Monet, to the [facility] DERMATOLOGY at [facility]. Please see a copy of my visit note below.    [facility] Dermatology Note      Dermatology Problem List:  1. Lichen planopilaris  Current treatment:  -ketoconazole shampoo 2x/wk  -clobetasol 0.05% solution as needed for pruritus  -patient uses Quercetin and Bromelain supplements orally for pruritus  Past treatment:  -plaquenil 200 mg daily (d/c July 2018)  -LLLT  -Benadryl and Allegra for pruritus  -fluocinolone oil  -gabapentin    2. ACD: patch testing 8/9/2017  mild positive reaction to nickel, linalool, methylisothiezolinone, sodium metabisulfite, methyldibromoglutaronitrile/phenoxyethanol, benzisothiazolinone, disperse dye mix, gold, p-methylaminophenol sulphate (Metol), and iodine. It was  determined that many of her products contained these ingredients and recommended that she use products off of the [place] safe list.    Encounter Date: Jul 9, 2018    CC:  Chief Complaint   Patient presents with     Hair Loss     Chitra is here today for her two month follow up, patient states she is feeling better even after stopping plaqeunil a week and a half a go per patient.          History of Present Illness:  Ms. Chitra Monet is a 63 year old female who presents as a follow-up for lichen planopilaris. The patient was last seen 3/12/2018 when she felt that her vertex scalp had been thinning. In the past she had tried using LLLT which she did not wish to continue. She also has tried gabapentin in the past and did not feel that it helped. A week and a half ago, she discontinued her use of Plaquenil as she felt it was leading to redness of her scalp. She does not wish to restart use of Plaquenil even at a less frequent dose. She is happy that she was able to  get a wig that she may use if necessary if her condition worsens. She shampoos her hair about once every four days, using ketoconazole shampoo. She uses clobetasol 0.05% solution as needed for pruritus of the scalp, which occurs about once weekly. She has not been using fluocinolone oil, Allegra, or Benadryl. For her general pruritus, she uses Quercetin and Bromelain supplements, which she takes orally. She is happy about the status of her hair now, and feels that it has improved, especially that the redness of her scalp has gone away. The patient denies any burning, numbness, and only experiences a little flaking. The patient states that she has had no changes in medication or medical history since she was last seen in March 2018. The patient also mentioned that she has a lesion on her R middle finger that has not healed in four years. The patient is otherwise feeling well.    Past Medical History:   Patient Active Problem List   Diagnosis     Lichen plano-pilaris     Dermatitis     Pruritus     Allergic dermatitis due to other chemical product     Past Medical History:   Diagnosis Date     Constipation     CHRONIC     Cough     CHRONIC     Migraine, unspecified, without mention of intractable migraine without mention of status migrainosus      Unspecified hypothyroidism      Past Surgical History:   Procedure Laterality Date     BIOPSY      BREAST     COLONOSCOPY  6/6/2013    Procedure: COLONOSCOPY;  COLONOSCOPY;  Surgeon: Caridad Young MD;  [facility]: [facility]     COSMETIC SURGERY      bleph 2 weeks ago     ENT SURGERY      TONSILLECTOMY     GYN SURGERY      LAPAROSCOPY       Social History:  Social history was not asked at this visit.    Family History:  Patient states her father had male pattern baldness in his 80s.    Medications:  Current Outpatient Prescriptions   Medication Sig Dispense Refill     albuterol (ALBUTEROL) 108 (90 BASE) MCG/ACT inhaler Inhale 2 puffs into the lungs as needed. Patient uses 4  days per week.       BIOTIN PO        BuPROPion HCl (WELLBUTRIN PO) Take 50 mg by mouth daily       clobetasol (TEMOVATE) 0.05 % external solution Apply topically as needed 50 mL 3     fexofenadine (ALLEGRA ALLERGY) 180 MG tablet Take 1 tablet (180 mg) by mouth daily 60 tablet 3     Fluocinolone Acetonide Scalp 0.01 % OIL oil Apply topically once a week Use one capful on affected areas of scalp at bedtime once weekly, may increase to twice weekly as needed 118 mL 1     ketoconazole (NIZORAL) 2 % shampoo Apply to scalp, lather, massage into skin, leave on for 2-3 minutes, then rinse, do up to 5 times per week 120 mL 11     Multiple Vitamin (MULTIVITAMINS PO) Take  by mouth daily.       NONFORMULARY One scalp prosthesis for scalp hair loss, lichen plano pilaris 1 each 5     NONFORMULARY One scalp prosthesis 1 each 1     NONFORMULARY One full scalp cranial prosthesis 1 each 5     NONFORMULARY One low level laser light 1 each 2     SUMAtriptan Succinate (IMITREX PO) Take 100 mg by mouth as needed.       tacrolimus (PROTOPIC) 0.1 % ointment Apply a thin layer to frontal scalp area before applying hair spray 60 g 1     Thyroid (NATURE-THROID) 81.25 MG TABS Take by mouth daily       hydroxychloroquine (PLAQUENIL) 200 MG tablet Take 1 tablet (200 mg) by mouth 2 times daily (Patient not taking: Reported on 7/9/2018) 60 tablet 3     Allergies   Allergen Reactions     Gold Salts Rash     Mild positive reaction to gold from derm patch testing      Iodine Rash     Mild positive reaction to derm patch testing     Linalool Rash     Mild positive reaction from derm patch testing     Methylisothiazolinone Rash     Mild positive reaction from derm patch testing and also to phenoxy ethanol     Nickel Rash     Derm patch test results= mild positive reaction     No Clinical Screening - See Comments Rash     Results of derm skin patch testing :  Mild Positive reactions = Benzisothiazolinone , disperse dye mix  And P-methylaminophenol  shlphat (metol).                                                              Doubtful reactions ( possible irritations )= Balsam of [place] and Balsam Sherif     Shellac Rash     Doubtful reaction ( possible irritation) from derm patch testing results     Sodium Benzoate Rash     Doubtful reaction ( possible irritation) results from derm patch testing     Sodium Metabisulfite Rash     Mild positive reaction from derm patch testing         Review of Systems:  -As per HPI  -Constitutional: The patient denies fatigue, fevers, chills, unintended weight loss, and night sweats.  -HEENT: Patient denies nonhealing oral sores.  -Skin: As above in HPI. No additional skin concerns.    Physical exam:  Vitals: /67 (BP Location: Right arm, Patient Position: Sitting, Cuff Size: Adult Regular)  Pulse 86  GEN: This is a well developed, well-nourished female in no acute distress, in a pleasant mood.    SKIN: Focused examination of the scalp was performed. Lichen planopilaris is improved with regrowth in vertex and front hairline. General scale present on vertex.  -Regrowth layers: 1-2, 4-5, 8-9.  -LPPAI 0.67 (1 for perifollicular scale, 1 for perifollicular erythema, both in vertex).  -Part 1.2  -No general erythema, much improved from previous visits as seen in photographs.  -No other lesions of concern on areas examined.     Impression/Plan:  1. Lichen planopilaris - improved, regrowth of frontal hairline, parietal regions.    Continue ketoconazole 2% shampoo use 2x/wk.    Continue clobetasol 0.05% solution to use as needed for scalp pruritus.    Discussed it is okay for patient to discontinue use of plaquenil, okay to discontinue Derma-smoothe (patient had used it 2x total in past).    Discussed it is okay for patient to use Quercetin and Bromelain supplements rather than Benadryl and Allegra for pruritus as this is her preference and she finds this helpful    Patient pleased with current status and improvement of her  condition, will follow up in 4 months or as needed.      Follow-up in 4 months, earlier for new or changing lesions.     Staff Involved:  Scribed by TAYLOR Amezcua for Dr. Rossana Quinn.            I agree with the PFSH and ROS as completed by the Medical Student. The remainder of the encounter was performed by me and scribed by the Medical Student. The scribed note accurately reflects my personal services and the medical decisions made by me.      Rossana Quinn MD  Professor and Chair  Department of Dermatology  [facility]                            Again, thank you for allowing me to participate in the care of your patient.      Sincerely,    Rossana Quinn MD

## 2018-07-09 NOTE — MR AVS SNAPSHOT
After Visit Summary   7/9/2018    Chitra Monet    MRN: [id number]           Patient Information     Date Of Birth          1955        Visit Information        Provider Department      7/9/2018 3:30 PM Rossana Quinn MD [facility] Dermatology        Care Instructions    1. Continue to shampoo with ketoconazole shampoo about once every 4 days, or as often you are shampooing.  2. Continue to use clobetasol solution as needed for scalp itch.          Follow-ups after your visit        Your next 10 appointments already scheduled     Oct 29, 2018  2:50 PM CDT   (Arrive by 2:35 PM)   RETURN HAIRLOSS with Rossana Quinn MD   [facility] Dermatology ([facility])    [address] [place] [address] [phone]              Who to contact     Please call your clinic at [phone] to:    Ask questions about your health    Make or cancel appointments    Discuss your medicines    Learn about your test results    Speak to your doctor            Additional Information About Your Visit        Ask Ziggyhart Information     [employer] gives you secure access to your electronic health record. If you see a primary care provider, you can also send messages to your care team and make appointments. If you have questions, please call your primary care clinic.  If you do not have a primary care provider, please call [phone] and they will assist you.      [employer] is an electronic gateway that provides easy, online access to your medical records. With [employer], you can request a clinic appointment, read your test results, renew a prescription or communicate with your care team.     To access your existing account, please contact your [facility] or call [phone] for assistance.        Care EveryWhere ID     This is your Care EveryWhere ID. This could be used by other organizations to access your [facility]  records  [id number]        Your Vitals Were     Pulse                   86            Blood Pressure from Last 3 Encounters:   07/09/18 125/67   03/12/18 132/83   12/18/17 135/84    Weight from Last 3 Encounters:   06/06/13 71.7 kg (158 lb)              Today, you had the following     No orders found for display       Primary Care Provider Office Phone # Fax #    Amy Garcia [phone]       [facility] [address]  [place] [address]        Equal Access to Services     [facility]: Hadii aad ku hadasho Soomaali, waaxda luqadaha, qaybta kaalmada adeegyada, waxay jamiin hayshakiran sharifa carrasquillo . So [facility] [phone].    ATENCIÓN: Si habla español, tiene a bowling disposición servicios gratuitos de asistencia lingüística. [facility] [phone].    We comply with applicable federal civil rights laws and [place] laws. We do not discriminate on the basis of race, color, national origin, age, disability, sex, sexual orientation, or gender identity.            Thank you!     Thank you for choosing [facility] DERMATOLOGY  for your care. Our goal is always to provide you with excellent care. Hearing back from our patients is one way we can continue to improve our services. Please take a few minutes to complete the written survey that you may receive in the mail after your visit with us. Thank you!             Your Updated Medication List - Protect others around you: Learn how to safely use, store and throw away your medicines at www.disposemymeds.org.          This list is accurate as of 7/9/18  4:43 PM.  Always use your most recent med list.                   Brand Name Dispense Instructions for use Diagnosis    BIOTIN PO           clobetasol 0.05 % external solution    TEMOVATE    50 mL    Apply topically as needed    Lichen plano-pilaris, Dermatitis       fexofenadine 180 MG tablet    ALLEGRA ALLERGY    60 tablet    Take 1 tablet (180 mg) by mouth daily    Pruritus        Fluocinolone Acetonide Scalp 0.01 % Oil oil     118 mL    Apply topically once a week Use one capful on affected areas of scalp at bedtime once weekly, may increase to twice weekly as needed    Lichen plano-pilaris       hydroxychloroquine 200 MG tablet    PLAQUENIL    60 tablet    Take 1 tablet (200 mg) by mouth 2 times daily    Lichen plano-pilaris, Dermatitis       IMITREX PO      Take 100 mg by mouth as needed.        ketoconazole 2 % shampoo    NIZORAL    120 mL    Apply to scalp, lather, massage into skin, leave on for 2-3 minutes, then rinse, do up to 5 times per week    Dermatitis       MULTIVITAMINS PO      Take  by mouth daily.        NATURE-THROID 81.25 MG Tabs   Generic drug:  Thyroid      Take by mouth daily        * NONFORMULARY     1 each    One scalp prosthesis    Loss of hair       * NONFORMULARY     1 each    One full scalp cranial prosthesis    Lichen plano-pilaris       * NONFORMULARY     1 each    One low level laser light    Lichen plano-pilaris       * NONFORMULARY     1 each    One scalp prosthesis for scalp hair loss, lichen plano pilaris    Lichen plano-pilaris       PROAIR  (90 Base) MCG/ACT Inhaler   Generic drug:  albuterol      Inhale 2 puffs into the lungs as needed. Patient uses 4 days per week.        tacrolimus 0.1 % ointment    PROTOPIC    60 g    Apply a thin layer to frontal scalp area before applying hair spray    Dermatitis       WELLBUTRIN PO      Take 50 mg by mouth daily        * Notice:  This list has 4 medication(s) that are the same as other medications prescribed for you. Read the directions carefully, and ask your doctor or other care provider to review them with you.

## 2018-07-09 NOTE — PATIENT INSTRUCTIONS
1. Continue to shampoo with ketoconazole shampoo about once every 4 days, or as often you are shampooing.  2. Continue to use clobetasol solution as needed for scalp itch.

## 2018-07-09 NOTE — NURSING NOTE
Chief Complaint   Patient presents with     Hair Loss     Chitra is here today for her two month follow up, patient states she is feeling better even after stopping plaqeunil a week and a half a go per patient.      Zaria Mccray LPN

## 2018-12-30 NOTE — TELEPHONE ENCOUNTER
Chart reviewed, refill for hydroxychloroquine appropriate and sent.    Juarez Oswald MD  Dermatology resident, PGY-4  [phone]   
Last seen:3/12/18  Next appt:7/9/18    1. Lichen planopilaris, associated with irritant versus allergic contact dermatitis s/p patch testing 8/11/2017. LPPAI 0.67, improved compared to 12/18/2017    Continue Plaquenil 200mg tablet qDay - CBC & LFT normal 12/2017; had normal eye exam recently and follow up with ophthalmology in 1 year.     Continue Clobetasol 0.05% solution prn itching/redness.     Continue Fluocinolone oil once weekly.     Continue Allegra QAM and Benadryl QHS prn sensitive/itchy skin    Declined retrying laser light therapy - provided no improvement in the past     Kenalog intralesional injection procedure note : After verbal consent and discussion of risks including but not limited to atrophy, pain, and bruising,  time out was performed, 2 total cc's of Kenalog 10 mg/cc was injected into 20 sites on the vertex and posterior scalp.  The patient tolerated the procedure well and left the Dermatology clinic in good condition.     Follow-up in 3 months, earlier for new or changing lesions.   
5

## 2019-05-30 ENCOUNTER — TELEPHONE (OUTPATIENT)
Dept: DERMATOLOGY | Facility: CLINIC | Age: 64
End: 2019-05-30

## 2019-05-30 ENCOUNTER — HOSPITAL ENCOUNTER (OUTPATIENT)
Dept: MAMMOGRAPHY | Facility: CLINIC | Age: 64
Discharge: HOME | End: 2019-05-30
Attending: FAMILY MEDICINE | Admitting: FAMILY MEDICINE
Payer: COMMERCIAL

## 2019-05-30 DIAGNOSIS — Z12.31 VISIT FOR SCREENING MAMMOGRAM: ICD-10-CM

## 2019-05-30 PROCEDURE — 77063 BREAST TOMOSYNTHESIS BI: CPT

## 2019-06-08 DIAGNOSIS — L30.9 DERMATITIS: ICD-10-CM

## 2019-06-08 DIAGNOSIS — L66.10 LICHEN PLANO-PILARIS: ICD-10-CM

## 2019-06-12 RX ORDER — CLOBETASOL PROPIONATE 0.5 MG/ML
SOLUTION TOPICAL
Qty: 50 ML | Refills: 2 | OUTPATIENT
Start: 2019-06-12

## 2019-09-29 ENCOUNTER — HEALTH MAINTENANCE LETTER (OUTPATIENT)
Age: 64
End: 2019-09-29

## 2019-10-14 ENCOUNTER — OFFICE VISIT (OUTPATIENT)
Dept: DERMATOLOGY | Facility: CLINIC | Age: 64
End: 2019-10-14
Payer: COMMERCIAL

## 2019-10-14 VITALS — HEART RATE: 78 BPM | DIASTOLIC BLOOD PRESSURE: 54 MMHG | SYSTOLIC BLOOD PRESSURE: 105 MMHG

## 2019-10-14 DIAGNOSIS — L23.5 ALLERGIC DERMATITIS DUE TO OTHER CHEMICAL PRODUCT: ICD-10-CM

## 2019-10-14 DIAGNOSIS — L66.10 LICHEN PLANO-PILARIS: Primary | ICD-10-CM

## 2019-10-14 DIAGNOSIS — L30.9 DERMATITIS: ICD-10-CM

## 2019-10-14 RX ORDER — FLUOCINOLONE ACETONIDE 0.1 MG/ML
SOLUTION TOPICAL
Refills: 1 | Status: CANCELLED | OUTPATIENT
Start: 2019-10-14

## 2019-10-14 RX ORDER — FLUOCINOLONE ACETONIDE 0.11 MG/ML
OIL TOPICAL
Qty: 60 ML | Refills: 5 | Status: SHIPPED | OUTPATIENT
Start: 2019-10-14 | End: 2021-01-18

## 2019-10-14 ASSESSMENT — PAIN SCALES - GENERAL
PAINLEVEL: MILD PAIN (2)
PAINLEVEL: NO PAIN (0)

## 2019-10-14 NOTE — PROGRESS NOTES
[facility] Dermatology Note      Dermatology Problem List:  1. Lichen planopilaris  - Current tx: ILK 10mg/cc q 4-6 weeks (treated 10/14/19), flucinolone 0.01% to scalp twice weekly, clobetasol 0.05% solution as needed for pruritus  - Past tx: plaquenil 200 mg daily (d/c July 2018), LLLT, Benadryl and Allegra for pruritus, fluocinolone oil, gabapentin    2. ACD: patch testing 8/9/2017  - mild positive reaction to nickel, linalool, methylisothiezolinone, sodium metabisulfite, methyldibromoglutaronitrile/phenoxyethanol, benzisothiazolinone, disperse dye mix, gold, p-methylaminophenol sulphate (Metol), and iodine.  - It was determined that many of her products contained these ingredients and recommended that she use products off of the [employer] safe list.    Encounter Date: Oct 14, 2019    CC:  No chief complaint on file.        History of Present Illness:  Ms. Chitra Monet is a 64 year old female who presents as a follow-up for lichen planopilaris. She was last seen 7/9/2018 when her LPP seemed to be improved, with regrowth noted along her frontal hairline and parietal regions. However she returns to clinic today because she feels her LPP has flared. She has increased redness, pain, and itching since she was last seen. Also feels the affected area has expanded onto the back of her head and she has more scaling on the top of her scalp.     She does not use ketoconazole- feels her hair becomes darker when she uses it. Uses some sensitive skin shampoo every 4 days and clobetasol intermittently for pruritis- usually a few times a week.     She has been following with advanced dermatology for her LPP with ILK, most recently 8/28/2019. She is interested in following with us in the future for ILK and also has questions about PRP.     She also has waxing and waning irritation on her tongue which has been exacerbated recently by acidic foods. She has never been formally diagnosed but she believes this is  lichen planus on her tongue. Has had this for years intermittently. Was seen by the dentist 6 mo ago for a general checkup but has never been seen for this issue by them.    She reports no changes to her medical history or illness since her last visit. No other concerns.    Past Medical History:   Patient Active Problem List   Diagnosis     Lichen plano-pilaris     Dermatitis     Pruritus     Allergic dermatitis due to other chemical product     Past Medical History:   Diagnosis Date     Constipation     CHRONIC     Cough     CHRONIC     Migraine, unspecified, without mention of intractable migraine without mention of status migrainosus      Unspecified hypothyroidism      Past Surgical History:   Procedure Laterality Date     BIOPSY      BREAST     COLONOSCOPY  6/6/2013    Procedure: COLONOSCOPY;  COLONOSCOPY;  Surgeon: Caridad Young MD;  Location: [facility]     COSMETIC SURGERY      bleph 2 weeks ago     ENT SURGERY      TONSILLECTOMY     GYN SURGERY      LAPAROSCOPY       Social History:  Social history was not asked at this visit.    Family History:  Patient states her father had male pattern baldness in his 80s.    Medications:  Current Outpatient Medications   Medication Sig Dispense Refill     albuterol (ALBUTEROL) 108 (90 BASE) MCG/ACT inhaler Inhale 2 puffs into the lungs as needed. Patient uses 4 days per week.       BIOTIN PO        BuPROPion HCl (WELLBUTRIN PO) Take 50 mg by mouth daily       clobetasol (TEMOVATE) 0.05 % external solution Apply topically as needed 50 mL 3     fexofenadine (ALLEGRA ALLERGY) 180 MG tablet Take 1 tablet (180 mg) by mouth daily 60 tablet 3     Fluocinolone Acetonide Scalp 0.01 % OIL oil Apply topically once a week Use one capful on affected areas of scalp at bedtime once weekly, may increase to twice weekly as needed 118 mL 1     hydroxychloroquine (PLAQUENIL) 200 MG tablet Take 1 tablet (200 mg) by mouth 2 times daily (Patient not taking: Reported on 7/9/2018) 60 tablet  3     ketoconazole (NIZORAL) 2 % external shampoo Apply to scalp, lather, massage into skin, leave on for 2-3 minutes, then rinse, do up to 5 times per week 120 mL 11     Multiple Vitamin (MULTIVITAMINS PO) Take  by mouth daily.       NONFORMULARY One scalp prosthesis for scalp hair loss, lichen plano pilaris 1 each 5     NONFORMULARY One scalp prosthesis 1 each 1     NONFORMULARY One full scalp cranial prosthesis 1 each 5     NONFORMULARY One low level laser light 1 each 2     SUMAtriptan Succinate (IMITREX PO) Take 100 mg by mouth as needed.       tacrolimus (PROTOPIC) 0.1 % ointment Apply a thin layer to frontal scalp area before applying hair spray 60 g 1     Thyroid (NATURE-THROID) 81.25 MG TABS Take by mouth daily       Allergies   Allergen Reactions     Gold Salts Rash     Mild positive reaction to gold from derm patch testing      Iodine Rash     Mild positive reaction to derm patch testing     Linalool Rash     Mild positive reaction from derm patch testing     Methylisothiazolinone Rash     Mild positive reaction from derm patch testing and also to phenoxy ethanol     Nickel Rash     Derm patch test results= mild positive reaction     No Clinical Screening - See Comments Rash     Results of derm skin patch testing :  Mild Positive reactions = Benzisothiazolinone , disperse dye mix  And P-methylaminophenol shlphat (metol).                                                              Doubtful reactions ( possible irritations )= Balsam of [place] and Balsam [place]     Shellac Rash     Doubtful reaction ( possible irritation) from derm patch testing results     Sodium Benzoate Rash     Doubtful reaction ( possible irritation) results from derm patch testing     Sodium Metabisulfite Rash     Mild positive reaction from derm patch testing         Review of Systems:  -As per HPI  -Constitutional: The patient is feeling generally well, in usual state of health.  -Skin: As above in HPI. No additional skin  concerns.    Physical exam:  Vitals: /54   Pulse 78   GEN: This is a well developed, well-nourished female in no acute distress, in a pleasant mood.    SKIN: Focused examination of the scalp, face, and hands/nails was performed.   - LPPAI Score 0.67  - perifollicular erythema and scaling on the occiput extending onto the crown of the head with significant crusting and scaling on the scalp. Diffuse hair thinning with some loss localized around occiput   - tongue with diffuse white plaque covering anterior and midline not extending onto lateral surfaces. No other oral lesions seen.  -No other lesions of concern on areas examined.       Impression/Plan:  1. Lichen planopilaris -     Fluocinolone 0.01% solution to scalp twice weekly. Pt advised to use DHS zinc shampoo to help clean scalp after oil treatments.    Clobetasol 0.05% prn for scalp pruritis    Pt to consider PRP in future    Kenalog intralesional injection procedure note (performed by faculty): After verbal consent and discussion of risks including but not limited to atrophy, pain, and bruising,  time out was performed, 1 total cc of Kenalog 10 mg/cc was injected into 20 sites on the scalp. The patient tolerated the procedure well and left the Dermatology clinic in good condition.    2. Plaque on tongue suface- DDx includes leukoplakia, lichen pilaris, candidiasis     Pt encouraged to have further evaluation with her DDS. Continue to avoid foods that exacerbate symptoms.       Follow-up in 3 months, earlier for new or changing lesions.     Staff Involved:  I, Alejandra Astudillo MS4, saw and discussed this patient with my attending Dr. Cheyenne BURKETT.    Staff Physician:  I was present with the medical student who participated in the service and in the documentation of the note. I have verified the history and personally performed the physical exam and medical decision making. I agree with the assessment and plan of care as documented in the note.  Chitra was  also seen with the scribe today. ILK injections were primarily done by me.      Rossana Quinn MD  Professor and Chair  Department of Dermatology  [facility]: Phone: [phone], Fax:[phone]  [facility]: Phone: [phone], Fax: [phone]

## 2019-10-14 NOTE — LETTER
10/14/2019       RE: Chitra Monet  [address]  [address]  [place] [address]     Dear Colleague,    Thank you for referring your patient, Chitra Monet, to the [facility] DERMATOLOGY at [facility]. Please see a copy of my visit note below.    [facility] Dermatology Note      Dermatology Problem List:  1. Lichen planopilaris  - Current tx: ILK 10mg/cc q 4-6 weeks (treated 10/14/19), flucinolone 0.01% to scalp twice weekly, clobetasol 0.05% solution as needed for pruritus  - Past tx: plaquenil 200 mg daily (d/c July 2018), LLLT, Benadryl and Allegra for pruritus, fluocinolone oil, gabapentin    2. ACD: patch testing 8/9/2017  - mild positive reaction to nickel, linalool, methylisothiezolinone, sodium metabisulfite, methyldibromoglutaronitrile/phenoxyethanol, benzisothiazolinone, disperse dye mix, gold, p-methylaminophenol sulphate (Metol), and iodine.  - It was determined that many of her products contained these ingredients and recommended that she use products off of the [employer] safe list.    Encounter Date: Oct 14, 2019    CC:  No chief complaint on file.        History of Present Illness:  Ms. Chitra Monet is a 64 year old female who presents as a follow-up for lichen planopilaris. She was last seen 7/9/2018 when her LPP seemed to be improved, with regrowth noted along her frontal hairline and parietal regions. However she returns to clinic today because she feels her LPP has flared. She has increased redness, pain, and itching since she was last seen. Also feels the affected area has expanded onto the back of her head and she has more scaling on the top of her scalp.     She does not use ketoconazole- feels her hair becomes darker when she uses it. Uses some sensitive skin shampoo every 4 days and clobetasol intermittently for pruritis- usually a few times a week.     She has been following with advanced dermatology for her LPP with ILK, most  recently 8/28/2019. She is interested in following with us in the future for ILK and also has questions about PRP.     She also has waxing and waning irritation on her tongue which has been exacerbated recently by acidic foods. She has never been formally diagnosed but she believes this is lichen planus on her tongue. Has had this for years intermittently. Was seen by the dentist 6 mo ago for a general checkup but has never been seen for this issue by them.    She reports no changes to her medical history or illness since her last visit. No other concerns.    Past Medical History:   Patient Active Problem List   Diagnosis     Lichen plano-pilaris     Dermatitis     Pruritus     Allergic dermatitis due to other chemical product     Past Medical History:   Diagnosis Date     Constipation     CHRONIC     Cough     CHRONIC     Migraine, unspecified, without mention of intractable migraine without mention of status migrainosus      Unspecified hypothyroidism      Past Surgical History:   Procedure Laterality Date     BIOPSY      BREAST     COLONOSCOPY  6/6/2013    Procedure: COLONOSCOPY;  COLONOSCOPY;  Surgeon: Caridad Young MD;  Location: [facility]     COSMETIC SURGERY      bleph 2 weeks ago     ENT SURGERY      TONSILLECTOMY     GYN SURGERY      LAPAROSCOPY       Social History:  Social history was not asked at this visit.    Family History:  Patient states her father had male pattern baldness in his 80s.    Medications:  Current Outpatient Medications   Medication Sig Dispense Refill     albuterol (ALBUTEROL) 108 (90 BASE) MCG/ACT inhaler Inhale 2 puffs into the lungs as needed. Patient uses 4 days per week.       BIOTIN PO        BuPROPion HCl (WELLBUTRIN PO) Take 50 mg by mouth daily       clobetasol (TEMOVATE) 0.05 % external solution Apply topically as needed 50 mL 3     fexofenadine (ALLEGRA ALLERGY) 180 MG tablet Take 1 tablet (180 mg) by mouth daily 60 tablet 3     Fluocinolone Acetonide Scalp 0.01 % OIL oil  Apply topically once a week Use one capful on affected areas of scalp at bedtime once weekly, may increase to twice weekly as needed 118 mL 1     hydroxychloroquine (PLAQUENIL) 200 MG tablet Take 1 tablet (200 mg) by mouth 2 times daily (Patient not taking: Reported on 7/9/2018) 60 tablet 3     ketoconazole (NIZORAL) 2 % external shampoo Apply to scalp, lather, massage into skin, leave on for 2-3 minutes, then rinse, do up to 5 times per week 120 mL 11     Multiple Vitamin (MULTIVITAMINS PO) Take  by mouth daily.       NONFORMULARY One scalp prosthesis for scalp hair loss, lichen plano pilaris 1 each 5     NONFORMULARY One scalp prosthesis 1 each 1     NONFORMULARY One full scalp cranial prosthesis 1 each 5     NONFORMULARY One low level laser light 1 each 2     SUMAtriptan Succinate (IMITREX PO) Take 100 mg by mouth as needed.       tacrolimus (PROTOPIC) 0.1 % ointment Apply a thin layer to frontal scalp area before applying hair spray 60 g 1     Thyroid (NATURE-THROID) 81.25 MG TABS Take by mouth daily       Allergies   Allergen Reactions     Gold Salts Rash     Mild positive reaction to gold from derm patch testing      Iodine Rash     Mild positive reaction to derm patch testing     Linalool Rash     Mild positive reaction from derm patch testing     Methylisothiazolinone Rash     Mild positive reaction from derm patch testing and also to phenoxy ethanol     Nickel Rash     Derm patch test results= mild positive reaction     No Clinical Screening - See Comments Rash     Results of derm skin patch testing :  Mild Positive reactions = Benzisothiazolinone , disperse dye mix  And P-methylaminophenol shlphat (metol).                                                              Doubtful reactions ( possible irritations )= Balsam of [place] and Balsam [place]     Shellac Rash     Doubtful reaction ( possible irritation) from derm patch testing results     Sodium Benzoate Rash     Doubtful reaction ( possible irritation)  results from derm patch testing     Sodium Metabisulfite Rash     Mild positive reaction from derm patch testing         Review of Systems:  -As per HPI  -Constitutional: The patient is feeling generally well, in usual state of health.  -Skin: As above in HPI. No additional skin concerns.    Physical exam:  Vitals: /54   Pulse 78   GEN: This is a well developed, well-nourished female in no acute distress, in a pleasant mood.    SKIN: Focused examination of the scalp, face, and hands/nails was performed.   - LPPAI Score 0.67  - perifollicular erythema and scaling on the occiput extending onto the crown of the head with significant crusting and scaling on the scalp. Diffuse hair thinning with some loss localized around occiput   - tongue with diffuse white plaque covering anterior and midline not extending onto lateral surfaces. No other oral lesions seen.  -No other lesions of concern on areas examined.       Impression/Plan:  1. Lichen planopilaris -     Fluocinolone 0.01% solution to scalp twice weekly. Pt advised to use DHS zinc shampoo to help clean scalp after oil treatments.    Clobetasol 0.05% prn for scalp pruritis    Pt to consider PRP in future    Kenalog intralesional injection procedure note (performed by faculty): After verbal consent and discussion of risks including but not limited to atrophy, pain, and bruising,  time out was performed, 1 total cc of Kenalog 10 mg/cc was injected into 20 sites on the scalp. The patient tolerated the procedure well and left the Dermatology clinic in good condition.    2. Plaque on tongue suface- DDx includes leukoplakia, lichen pilaris, candidiasis     Pt encouraged to have further evaluation with her DDS. Continue to avoid foods that exacerbate symptoms.       Follow-up in 3 months, earlier for new or changing lesions.     Staff Involved:  I, Alejandra Astudillo MS4, saw and discussed this patient with my attending Dr. Cheyenne BURKETT.    Staff Physician:  I was  present with the medical student who participated in the service and in the documentation of the note. I have verified the history and personally performed the physical exam and medical decision making. I agree with the assessment and plan of care as documented in the note.  Chitra was also seen with the scribe today. ILK injections were primarily done by me.      Rossana Quinn MD  Professor and Chair  Department of Dermatology  [facility]: Phone: [phone], Fax:[phone]  [facility]: Phone: [phone], Fax: [phone]                                                                            Drug Administration Record    Prior to injection, verified patient identity using patient's name and date of birth.  Due to injection administration, patient instructed to remain in clinic for 15 minutes  afterwards, and to report any adverse reaction to me immediately.    Drug Name: triamcinolone acetonide(kenalog)  Dose: 1mL of triamcinolone 10mg/mL, 10mg dose  Route administered: ID  NDC #: [id number]: Kenalog-10 (2339-8661-74)  Amount of waste(mL): 4mL  Reason for waste: Multi dose vial    LOT #: [id number]  SITE: See provider note   : [employer]  EXPIRATION DATE: 02/21      Again, thank you for allowing me to participate in the care of your patient.      Sincerely,    Rossana Quinn MD

## 2019-10-14 NOTE — PATIENT INSTRUCTIONS
Massage the oil into your whole scalp once a week and leave on for a minimum of 4 hours or overnight.    Use DHS zinc shampoo for sensitive skin to help clean the scalp after oil treatments. You could also use head and shoulders if you wish.

## 2019-10-14 NOTE — NURSING NOTE
Dermatology Rooming Note    Chitra Monet's goals for this visit include:   Chief Complaint   Patient presents with     Hair Loss     Lichen planopilaris - Chitra notes that her scalp was really itchy over the weekend.     Derm Problem     Lichen planus on Tongue      Keri Delgadillo, CMA

## 2019-10-14 NOTE — PROGRESS NOTES
Drug Administration Record    Prior to injection, verified patient identity using patient's name and date of birth.  Due to injection administration, patient instructed to remain in clinic for 15 minutes  afterwards, and to report any adverse reaction to me immediately.    Drug Name: triamcinolone acetonide(kenalog)  Dose: 1mL of triamcinolone 10mg/mL, 10mg dose  Route administered: ID  NDC #: [id number]: Kenalog-10 (5526-8614-88)  Amount of waste(mL): 4mL  Reason for waste: Multi dose vial    LOT #: [id number]  SITE: See provider note   : [employer]  EXPIRATION DATE: 02/21

## 2019-12-13 ENCOUNTER — OFFICE VISIT (OUTPATIENT)
Dept: DERMATOLOGY | Facility: CLINIC | Age: 64
End: 2019-12-13
Payer: COMMERCIAL

## 2019-12-13 VITALS — SYSTOLIC BLOOD PRESSURE: 128 MMHG | DIASTOLIC BLOOD PRESSURE: 72 MMHG | WEIGHT: 134.6 LBS

## 2019-12-13 DIAGNOSIS — L66.10 LICHEN PLANO-PILARIS: Primary | ICD-10-CM

## 2019-12-13 DIAGNOSIS — L29.9 PRURITUS: ICD-10-CM

## 2019-12-13 DIAGNOSIS — L23.5 ALLERGIC DERMATITIS DUE TO OTHER CHEMICAL PRODUCT: ICD-10-CM

## 2019-12-13 ASSESSMENT — PAIN SCALES - GENERAL: PAINLEVEL: NO PAIN (0)

## 2019-12-13 NOTE — LETTER
"12/13/2019       RE: Chitra Monet  [address]  [address]  [facility] [address]     Dear Colleague,    Thank you for referring your patient, Chitra Monet, to the [facility] DERMATOLOGY at [facility]. Please see a copy of my visit note below.    [facility] Dermatology Note      Dermatology Problem List:  1. Lichen planopilaris  - Current tx: ILK 10mg/cc q 4-6 weeks (treated 10/14/19), flucinolone 0.01% to scalp twice weekly, clobetasol 0.05% solution as needed for pruritus  - Will start PRP treatment 12/14/2019  - Past tx: plaquenil 200 mg daily (d/c July 2018), LLLT, Benadryl and Allegra for pruritus, fluocinolone oil, gabapentin    2. ACD: patch testing 8/9/2017  - mild positive reaction to nickel, linalool, methylisothiezolinone, sodium metabisulfite, methyldibromoglutaronitrile/phenoxyethanol, benzisothiazolinone, disperse dye mix, gold, p-methylaminophenol sulphate (Metol), and iodine.  - It was determined that many of her products contained these ingredients and recommended that she use products off of the [place] safe list.    Encounter Date: Dec 13, 2019    CC:  Chief Complaint   Patient presents with     Hair Loss     hair loss f/u - Chitra states, I think I've lost more hair\"         History of Present Illness:  Ms. Chitra Monet is a 64 year old female who presents as a follow-up for lichen planopilaris. She was last seen 10/14/2019 when she had 1 ml of Kenalog 10 mg/cc injected into 20 sites of scalp. She is continuing to use fluocinolone 0.01% solution once weekly and clobetasol 0.05% solution every 1-2 weeks as needed for itch. She thinks her hair loss is at least stable.     She has occasional scalp pruritus well controlled with clobetasol solution. Denies pain, burning or tingling. Using sensitive skin shampoo couple times weekly. Today, she is wondering about PRP injections. She reports no changes to her medical history or illness " since her last visit. No new medications. No other concerns.      Past Medical History:   Patient Active Problem List   Diagnosis     Lichen plano-pilaris     Dermatitis     Pruritus     Allergic dermatitis due to other chemical product     Past Medical History:   Diagnosis Date     Constipation     CHRONIC     Cough     CHRONIC     Migraine, unspecified, without mention of intractable migraine without mention of status migrainosus      Unspecified hypothyroidism      Past Surgical History:   Procedure Laterality Date     BIOPSY      BREAST     COLONOSCOPY  6/6/2013    Procedure: COLONOSCOPY;  COLONOSCOPY;  Surgeon: Caridad Young MD;  Location: [facility]     COSMETIC SURGERY      bleph 2 weeks ago     ENT SURGERY      TONSILLECTOMY     GYN SURGERY      LAPAROSCOPY       Social History:  Social history was not asked at this visit.    Family History:  Patient states her father had male pattern baldness in his 80s.    Medications:  Current Outpatient Medications   Medication Sig Dispense Refill     albuterol (ALBUTEROL) 108 (90 BASE) MCG/ACT inhaler Inhale 2 puffs into the lungs as needed. Patient uses 4 days per week.       BuPROPion HCl (WELLBUTRIN PO) Take 50 mg by mouth daily       clobetasol (TEMOVATE) 0.05 % external solution Apply topically as needed 50 mL 3     fexofenadine (ALLEGRA ALLERGY) 180 MG tablet Take 1 tablet (180 mg) by mouth daily 60 tablet 3     Fluocinolone Acetonide Scalp (DERMA-SMOOTHE/FS SCALP) 0.01 % OIL oil One to 2 mil to scalp once to twice a week for a minimum of 4 hours and up to overnight 60 mL 5     Fluocinolone Acetonide Scalp 0.01 % OIL oil Apply topically once a week Use one capful on affected areas of scalp at bedtime once weekly, may increase to twice weekly as needed 118 mL 1     hydroxychloroquine (PLAQUENIL) 200 MG tablet Take 1 tablet (200 mg) by mouth 2 times daily 60 tablet 3     ketoconazole (NIZORAL) 2 % external shampoo Apply to scalp, lather, massage into skin, leave  on for 2-3 minutes, then rinse, do up to 5 times per week 120 mL 11     Multiple Vitamin (MULTIVITAMINS PO) Take  by mouth daily.       NONFORMULARY One scalp prosthesis for scalp hair loss, lichen plano pilaris 1 each 5     NONFORMULARY One scalp prosthesis 1 each 1     NONFORMULARY One full scalp cranial prosthesis 1 each 5     NONFORMULARY One low level laser light 1 each 2     SUMAtriptan Succinate (IMITREX PO) Take 100 mg by mouth as needed.       tacrolimus (PROTOPIC) 0.1 % ointment Apply a thin layer to frontal scalp area before applying hair spray 60 g 1     Thyroid (NATURE-THROID) 81.25 MG TABS Take by mouth daily       Allergies   Allergen Reactions     Gold Salts Rash     Mild positive reaction to gold from derm patch testing      Iodine Rash     Mild positive reaction to derm patch testing     Linalool Rash     Mild positive reaction from derm patch testing     Methylisothiazolinone Rash     Mild positive reaction from derm patch testing and also to phenoxy ethanol     Nickel Rash     Derm patch test results= mild positive reaction     No Clinical Screening - See Comments Rash     Results of derm skin patch testing :  Mild Positive reactions = Benzisothiazolinone , disperse dye mix  And P-methylaminophenol shlphat (metol).                                                              Doubtful reactions ( possible irritations )= Balsam of [place] and Balsam [place]     Shellac Rash     Doubtful reaction ( possible irritation) from derm patch testing results     Sodium Benzoate Rash     Doubtful reaction ( possible irritation) results from derm patch testing     Sodium Metabisulfite Rash     Mild positive reaction from derm patch testing         Review of Systems:  -As per HPI  -Constitutional: The patient is feeling generally well, in usual state of health.  -Skin: As above in HPI. No additional skin concerns.    Physical exam:  Vitals: /72 (BP Location: Right arm, Patient Position: Sitting, Cuff Size:  Adult Regular)   GEN: This is a well developed, well-nourished female in no acute distress, in a pleasant mood.    SKIN: Focused examination of the scalp, face, and hands/nails was performed.   - LPPAI Score 0.67  - New hair regrowth on the right temple; left temple very stable  - Mild perifollicular erythema and scaling on the left parietal scalp   - Nails are normal  - Negative hair pull tests  -No other lesions of concern on areas examined.       Impression/Plan:  1. Lichen planopilaris -   - Continue using Fluocinolone 0.01% solution to scalp twice weekly. Pt advised to use DHS zinc shampoo to help clean scalp after oil treatments.  - Continue using clobetasol 0.05% prn for scalp pruritus  - Discussed PRP treatment in great detail. Patient would like to proceed with this to address areas with thinning hair (not scarred areas)  - Follow up for PRP for tomorrow, 12/14/2019.        Staff Involved:  I, Sean Osman ([email or web address]), saw and examined this patient with Dr. Quinn.     Staff Physician:  I was present with the medical student who participated in the service and in the documentation of the note. I have verified the history and personally performed the physical exam and medical decision making. I agree with the assessment and plan of care as documented in the note.       Rossana Quinn MD  Professor and Chair  Department of Dermatology  [facility]: Phone: [phone], Fax:[phone]  [facility]: Phone: [phone], Fax: [phone]

## 2019-12-13 NOTE — NURSING NOTE
"Dermatology Rooming Note    Chitra Monet's goals for this visit include:   Chief Complaint   Patient presents with     Hair Loss     hair loss f/u - Chitra states, I think I've lost more hair\"     Kaylee Burch, EMT    "

## 2019-12-13 NOTE — PROGRESS NOTES
"[facility] Dermatology Note      Dermatology Problem List:  1. Lichen planopilaris  - Current tx: ILK 10mg/cc q 4-6 weeks (treated 10/14/19), flucinolone 0.01% to scalp twice weekly, clobetasol 0.05% solution as needed for pruritus  - Will start PRP treatment 12/14/2019  - Past tx: plaquenil 200 mg daily (d/c July 2018), LLLT, Benadryl and Allegra for pruritus, fluocinolone oil, gabapentin    2. ACD: patch testing 8/9/2017  - mild positive reaction to nickel, linalool, methylisothiezolinone, sodium metabisulfite, methyldibromoglutaronitrile/phenoxyethanol, benzisothiazolinone, disperse dye mix, gold, p-methylaminophenol sulphate (Metol), and iodine.  - It was determined that many of her products contained these ingredients and recommended that she use products off of the [facility] safe list.    Encounter Date: Dec 13, 2019    CC:  Chief Complaint   Patient presents with     Hair Loss     hair loss f/u - Chitra states, I think I've lost more hair\"         History of Present Illness:  Ms. Chitra Monet is a 64 year old female who presents as a follow-up for lichen planopilaris. She was last seen 10/14/2019 when she had 1 ml of Kenalog 10 mg/cc injected into 20 sites of scalp. She is continuing to use fluocinolone 0.01% solution once weekly and clobetasol 0.05% solution every 1-2 weeks as needed for itch. She thinks her hair loss is at least stable.     She has occasional scalp pruritus well controlled with clobetasol solution. Denies pain, burning or tingling. Using sensitive skin shampoo couple times weekly. Today, she is wondering about PRP injections. She reports no changes to her medical history or illness since her last visit. No new medications. No other concerns.      Past Medical History:   Patient Active Problem List   Diagnosis     Lichen plano-pilaris     Dermatitis     Pruritus     Allergic dermatitis due to other chemical product     Past Medical History:   Diagnosis Date     Constipation  "    CHRONIC     Cough     CHRONIC     Migraine, unspecified, without mention of intractable migraine without mention of status migrainosus      Unspecified hypothyroidism      Past Surgical History:   Procedure Laterality Date     BIOPSY      BREAST     COLONOSCOPY  6/6/2013    Procedure: COLONOSCOPY;  COLONOSCOPY;  Surgeon: Caridad Young MD;  Location: [facility]     COSMETIC SURGERY      bleph 2 weeks ago     ENT SURGERY      TONSILLECTOMY     GYN SURGERY      LAPAROSCOPY       Social History:  Social history was not asked at this visit.    Family History:  Patient states her father had male pattern baldness in his 80s.    Medications:  Current Outpatient Medications   Medication Sig Dispense Refill     albuterol (ALBUTEROL) 108 (90 BASE) MCG/ACT inhaler Inhale 2 puffs into the lungs as needed. Patient uses 4 days per week.       BuPROPion HCl (WELLBUTRIN PO) Take 50 mg by mouth daily       clobetasol (TEMOVATE) 0.05 % external solution Apply topically as needed 50 mL 3     fexofenadine (ALLEGRA ALLERGY) 180 MG tablet Take 1 tablet (180 mg) by mouth daily 60 tablet 3     Fluocinolone Acetonide Scalp (DERMA-SMOOTHE/FS SCALP) 0.01 % OIL oil One to 2 mil to scalp once to twice a week for a minimum of 4 hours and up to overnight 60 mL 5     Fluocinolone Acetonide Scalp 0.01 % OIL oil Apply topically once a week Use one capful on affected areas of scalp at bedtime once weekly, may increase to twice weekly as needed 118 mL 1     hydroxychloroquine (PLAQUENIL) 200 MG tablet Take 1 tablet (200 mg) by mouth 2 times daily 60 tablet 3     ketoconazole (NIZORAL) 2 % external shampoo Apply to scalp, lather, massage into skin, leave on for 2-3 minutes, then rinse, do up to 5 times per week 120 mL 11     Multiple Vitamin (MULTIVITAMINS PO) Take  by mouth daily.       NONFORMULARY One scalp prosthesis for scalp hair loss, lichen plano pilaris 1 each 5     NONFORMULARY One scalp prosthesis 1 each 1     NONFORMULARY One full scalp  cranial prosthesis 1 each 5     NONFORMULARY One low level laser light 1 each 2     SUMAtriptan Succinate (IMITREX PO) Take 100 mg by mouth as needed.       tacrolimus (PROTOPIC) 0.1 % ointment Apply a thin layer to frontal scalp area before applying hair spray 60 g 1     Thyroid (NATURE-THROID) 81.25 MG TABS Take by mouth daily       Allergies   Allergen Reactions     Gold Salts Rash     Mild positive reaction to gold from derm patch testing      Iodine Rash     Mild positive reaction to derm patch testing     Linalool Rash     Mild positive reaction from derm patch testing     Methylisothiazolinone Rash     Mild positive reaction from derm patch testing and also to phenoxy ethanol     Nickel Rash     Derm patch test results= mild positive reaction     No Clinical Screening - See Comments Rash     Results of derm skin patch testing :  Mild Positive reactions = Benzisothiazolinone , disperse dye mix  And P-methylaminophenol shlphat (metol).                                                              Doubtful reactions ( possible irritations )= Balsam of [place] and Balsam [place]     Shellac Rash     Doubtful reaction ( possible irritation) from derm patch testing results     Sodium Benzoate Rash     Doubtful reaction ( possible irritation) results from derm patch testing     Sodium Metabisulfite Rash     Mild positive reaction from derm patch testing         Review of Systems:  -As per HPI  -Constitutional: The patient is feeling generally well, in usual state of health.  -Skin: As above in HPI. No additional skin concerns.    Physical exam:  Vitals: /72 (BP Location: Right arm, Patient Position: Sitting, Cuff Size: Adult Regular)   GEN: This is a well developed, well-nourished female in no acute distress, in a pleasant mood.    SKIN: Focused examination of the scalp, face, and hands/nails was performed.   - LPPAI Score 0.67  - New hair regrowth on the right temple; left temple very stable  - Mild perifollicular  erythema and scaling on the left parietal scalp   - Nails are normal  - Negative hair pull tests  -No other lesions of concern on areas examined.       Impression/Plan:  1. Lichen planopilaris -   - Continue using Fluocinolone 0.01% solution to scalp twice weekly. Pt advised to use DHS zinc shampoo to help clean scalp after oil treatments.  - Continue using clobetasol 0.05% prn for scalp pruritus  - Discussed PRP treatment in great detail. Patient would like to proceed with this to address areas with thinning hair (not scarred areas)  - Follow up for PRP for tomorrow, 12/14/2019.        Staff Involved:  I, Sean Osman (MS4), saw and examined this patient with Dr. Quinn.     Staff Physician:  I was present with the medical student who participated in the service and in the documentation of the note. I have verified the history and personally performed the physical exam and medical decision making. I agree with the assessment and plan of care as documented in the note.       Rossana Quinn MD  Professor and Chair  Department of Dermatology  [facility]: Phone: [phone], Fax:[phone]  [facility]: Phone: [phone], Fax: [phone]

## 2020-01-29 ENCOUNTER — OFFICE VISIT (OUTPATIENT)
Dept: DERMATOLOGY | Facility: CLINIC | Age: 65
End: 2020-01-29
Payer: COMMERCIAL

## 2020-01-29 DIAGNOSIS — Z51.81 MEDICATION MONITORING ENCOUNTER: ICD-10-CM

## 2020-01-29 DIAGNOSIS — L66.10 LICHEN PLANOPILARIS: Primary | ICD-10-CM

## 2020-01-29 LAB
ALBUMIN SERPL-MCNC: 3.5 G/DL (ref 3.4–5)
ALP SERPL-CCNC: 73 U/L (ref 40–150)
ALT SERPL W P-5'-P-CCNC: 28 U/L (ref 0–50)
ANION GAP SERPL CALCULATED.3IONS-SCNC: 3 MMOL/L (ref 3–14)
AST SERPL W P-5'-P-CCNC: 22 U/L (ref 0–45)
BASOPHILS # BLD AUTO: 0 10E9/L (ref 0–0.2)
BASOPHILS NFR BLD AUTO: 0.8 %
BILIRUB SERPL-MCNC: 0.3 MG/DL (ref 0.2–1.3)
BUN SERPL-MCNC: 11 MG/DL (ref 7–30)
CALCIUM SERPL-MCNC: 9.1 MG/DL (ref 8.5–10.1)
CHLORIDE SERPL-SCNC: 106 MMOL/L (ref 94–109)
CO2 SERPL-SCNC: 31 MMOL/L (ref 20–32)
CREAT SERPL-MCNC: 0.58 MG/DL (ref 0.52–1.04)
DIFFERENTIAL METHOD BLD: NORMAL
EOSINOPHIL # BLD AUTO: 0.1 10E9/L (ref 0–0.7)
EOSINOPHIL NFR BLD AUTO: 2.2 %
ERYTHROCYTE [DISTWIDTH] IN BLOOD BY AUTOMATED COUNT: 12.6 % (ref 10–15)
GFR SERPL CREATININE-BSD FRML MDRD: >90 ML/MIN/{1.73_M2}
GLUCOSE SERPL-MCNC: 72 MG/DL (ref 70–99)
HCT VFR BLD AUTO: 39.6 % (ref 35–47)
HGB BLD-MCNC: 12.9 G/DL (ref 11.7–15.7)
IMM GRANULOCYTES # BLD: 0 10E9/L (ref 0–0.4)
IMM GRANULOCYTES NFR BLD: 0.4 %
LYMPHOCYTES # BLD AUTO: 1.7 10E9/L (ref 0.8–5.3)
LYMPHOCYTES NFR BLD AUTO: 34.5 %
MCH RBC QN AUTO: 30.7 PG (ref 26.5–33)
MCHC RBC AUTO-ENTMCNC: 32.6 G/DL (ref 31.5–36.5)
MCV RBC AUTO: 94 FL (ref 78–100)
MONOCYTES # BLD AUTO: 0.4 10E9/L (ref 0–1.3)
MONOCYTES NFR BLD AUTO: 8 %
NEUTROPHILS # BLD AUTO: 2.7 10E9/L (ref 1.6–8.3)
NEUTROPHILS NFR BLD AUTO: 54.1 %
NRBC # BLD AUTO: 0 10*3/UL
NRBC BLD AUTO-RTO: 0 /100
PLATELET # BLD AUTO: 231 10E9/L (ref 150–450)
POTASSIUM SERPL-SCNC: 3.8 MMOL/L (ref 3.4–5.3)
PROT SERPL-MCNC: 6.8 G/DL (ref 6.8–8.8)
RBC # BLD AUTO: 4.2 10E12/L (ref 3.8–5.2)
SODIUM SERPL-SCNC: 140 MMOL/L (ref 133–144)
WBC # BLD AUTO: 5 10E9/L (ref 4–11)

## 2020-01-29 RX ORDER — HYDROXYCHLOROQUINE SULFATE 200 MG/1
TABLET, FILM COATED ORAL
Qty: 90 TABLET | Refills: 3 | Status: SHIPPED | OUTPATIENT
Start: 2020-01-29 | End: 2020-04-06

## 2020-01-29 ASSESSMENT — PAIN SCALES - GENERAL: PAINLEVEL: NO PAIN (0)

## 2020-01-29 NOTE — NURSING NOTE
Dermatology Rooming Note    Chitra Monet's goals for this visit include:   Chief Complaint   Patient presents with     Hair Loss     Lichen planopilaris - Chitra notes no change since her last visit.     Keri Delgadillo, CMA

## 2020-01-29 NOTE — PATIENT INSTRUCTIONS
- Can begin DHS Zinc shampoo over the counter, or anything with pyrithione zinc    We will restart the Plaquenil if labs look okay.    Return in April as planned.

## 2020-01-29 NOTE — LETTER
1/29/2020       RE: Chitra Monet  [address]  [address]  [facility] [address]     Dear Colleague,    Thank you for referring your patient, Chitra Monet, to the [facility] DERMATOLOGY at [facility]. Please see a copy of my visit note below.    [facility] Dermatology Note      Dermatology Problem List:  1. Lichen planopilaris  - Current tx: resumed Plaquenil Jan 2020, ILK 10mg/cc q 4-6 weeks, fluocinolone 0.01% to scalp twice weekly, clobetasol 0.05% solution as needed for pruritus, ketoconazole shampoo occasionally   - Pursuing PRP treatment, hopefully in spring 2020  - Past tx: plaquenil 200 mg daily (d/c July 2018, resumed Jan 2020 as above), LLLT, Benadryl and Allegra for pruritus, fluocinolone oil, gabapentin     2. ACD: patch testing 8/9/2017  - mild positive reaction to nickel, linalool, methylisothiezolinone, sodium metabisulfite, methyldibromoglutaronitrile/phenoxyethanol, benzisothiazolinone, disperse dye mix, gold, p-methylaminophenol sulphate (Metol), and iodine.  - It was determined that many of her products contained these ingredients and recommended that she use products off of the [place] safe list.    Encounter Date: Jan 29, 2020    CC:  Chief Complaint   Patient presents with     Hair Loss     Lichen planopilaris - Chitra notes no change since her last visit.         History of Present Illness:  Ms. Chitra Monet is a 64 year old female who presents as a follow-up for lichen planopilaris. The patient was last seen on 12/13/2019 when PRP treatment was discussed. PRP was planned for 12/14/2019 but patient canceled due to cost. She has continued to consider and would now like to reschedule and hopes to pursue PRP this spring.     Since her last visit, she reports worsening hair loss, which became especially apparent when she had her hair cut 1 week ago, especially on the top back of the head. She is having itching and inflammation of  the scalp and reports scalp bumps she can feel off and on. She continues to use clobetasol for itching which helps. Not using fluocinolone as often due to its oily effect, likely 1-2 times since last month. Using ketoconazole shampoo occasionally, once or twice since last visit. Dislikes shampoo because it made her hair oily and darker. Interested in Kenalog injections today. Patient is open to restarting Plaquenil and reports a recent weight of 133 lbs. She doesn't recall why she stopped Plaquenil in the past - per prior notes, stopped as she thought it made her scalp more red.    Otherwise feeling well, no additional skin concerns.     Past Medical History:   Patient Active Problem List   Diagnosis     Lichen plano-pilaris     Dermatitis     Pruritus     Allergic dermatitis due to other chemical product     Past Medical History:   Diagnosis Date     Constipation     CHRONIC     Cough     CHRONIC     Migraine, unspecified, without mention of intractable migraine without mention of status migrainosus      Unspecified hypothyroidism      Past Surgical History:   Procedure Laterality Date     BIOPSY      BREAST     COLONOSCOPY  6/6/2013    Procedure: COLONOSCOPY;  COLONOSCOPY;  Surgeon: Caridad Young MD;  Location: [facility]     COSMETIC SURGERY      bleph 2 weeks ago     ENT SURGERY      TONSILLECTOMY     GYN SURGERY      LAPAROSCOPY       Social History:  Patient reports that she has never smoked. She has never used smokeless tobacco. She reports current alcohol use. She reports that she does not use drugs.    Family History:  Family History   Problem Relation Age of Onset     Melanoma No family hx of      Skin Cancer No family hx of        Medications:  Current Outpatient Medications   Medication Sig Dispense Refill     albuterol (ALBUTEROL) 108 (90 BASE) MCG/ACT inhaler Inhale 2 puffs into the lungs as needed. Patient uses 4 days per week.       BuPROPion HCl (WELLBUTRIN PO) Take 50 mg by mouth daily        clobetasol (TEMOVATE) 0.05 % external solution Apply topically as needed 50 mL 3     fexofenadine (ALLEGRA ALLERGY) 180 MG tablet Take 1 tablet (180 mg) by mouth daily 60 tablet 3     Fluocinolone Acetonide Scalp (DERMA-SMOOTHE/FS SCALP) 0.01 % OIL oil One to 2 mil to scalp once to twice a week for a minimum of 4 hours and up to overnight 60 mL 5     Fluocinolone Acetonide Scalp 0.01 % OIL oil Apply topically once a week Use one capful on affected areas of scalp at bedtime once weekly, may increase to twice weekly as needed 118 mL 1     hydroxychloroquine (PLAQUENIL) 200 MG tablet Take 1 tablet (200 mg) by mouth 2 times daily 60 tablet 3     ketoconazole (NIZORAL) 2 % external shampoo Apply to scalp, lather, massage into skin, leave on for 2-3 minutes, then rinse, do up to 5 times per week 120 mL 11     Multiple Vitamin (MULTIVITAMINS PO) Take  by mouth daily.       NONFORMULARY One scalp prosthesis for scalp hair loss, lichen plano pilaris 1 each 5     NONFORMULARY One scalp prosthesis 1 each 1     NONFORMULARY One full scalp cranial prosthesis 1 each 5     NONFORMULARY One low level laser light 1 each 2     SUMAtriptan Succinate (IMITREX PO) Take 100 mg by mouth as needed.       tacrolimus (PROTOPIC) 0.1 % ointment Apply a thin layer to frontal scalp area before applying hair spray 60 g 1     Thyroid (NATURE-THROID) 81.25 MG TABS Take by mouth daily       Allergies   Allergen Reactions     Gold Salts Rash     Mild positive reaction to gold from derm patch testing      Iodine Rash     Mild positive reaction to derm patch testing     Linalool Rash     Mild positive reaction from derm patch testing     Methylisothiazolinone Rash     Mild positive reaction from derm patch testing and also to phenoxy ethanol     Nickel Rash     Derm patch test results= mild positive reaction     No Clinical Screening - See Comments Rash     Results of derm skin patch testing :  Mild Positive reactions = Benzisothiazolinone , disperse  dye mix  And P-methylaminophenol shlphat (metol).                                                              Doubtful reactions ( possible irritations )= Balsam of [place] and Balsam Sherif     Shellac Rash     Doubtful reaction ( possible irritation) from derm patch testing results     Sodium Benzoate Rash     Doubtful reaction ( possible irritation) results from derm patch testing     Sodium Metabisulfite Rash     Mild positive reaction from derm patch testing         Review of Systems:  -As per HPI  -Constitutional: Otherwise feeling well today, in usual state of health.  -Skin: As above in HPI. No additional skin concerns.    Physical exam:  GEN: This is a well developed, well-nourished female in no acute distress, in a pleasant mood.    SKIN: Focused examination of the scalp, eyebrows, eyelashes, and fingernails was performed.  - Vertex scalp with diffuse perifollicular erythema and scaling. Scattered patches of alopecia with loss of follicular ostia.  -No other lesions of concern on areas examined.     Impression/Plan:  1. Lichen planopilaris, with ongoing activity on exam. Given severity, we discussed resuming Plaquenil which she is open to as she does not recall why she stopped in past and prior note reported increased redness with Plaquenil as side effect. Will also continue topicals as below - as she dislikes ketoconazole shampoo, advised could use a shampoo containing pyrithione zinc. Will also schedule for PRP when able. Lastly will repeat ILK today.  - Labs today: CBC, CMP.  - Plan for repeat eye exam.  - Resume Plaquenil total dose of 300 mg daily based on 5 mg/kg weight-based dosing (either 100 mg in AM and 200 mg in PM if can cut tabs otherwise could alternate 200 mg BID with 200 mg daily).  - Continue Fluocinolone 0.01% solution to scalp twice weekly  - Discontinue ketoconazole shampoo as does not like   - Start pyrithione zinc shampoo daily  - Continue Clobetasol solution daily prn for scalp  pruritis  - Schedule PRP for this spring if possible, April is full so patient will need to call for May when schedule opens  - Kenalog intralesional injection procedure note (performed by faculty): After verbal consent and discussion of risks including but not limited to atrophy, pain, and bruising,  time out was performed, 20 total cc of Kenalog 10 mg/cc was injected into 20 sites on the vertex and parietal scalp. The patient tolerated the procedure well and left the Dermatology clinic in good condition.    CC Amy Garcia MD  [place] [employer] [facility]  [address]  [place] [address] on close of this encounter.    Follow-up in 2 months with Dr. Quinn as planned, earlier for new or changing lesions.     Staff Involved:  I, Shante Man, MS4, saw and examined the patient in the presence of Dr. Tucker.    Staff Physician:  I was present with the medical student who participated in the service and in the documentation of the note. I have verified the history and personally performed the physical exam and medical decision making. I agree with the assessment and plan of care as documented in the note.     The procedure(s) was(were) performed by myself.  Kenlog(IL) procedure note: After verbal consent and cleansing with isopropyl alcohol, 2.0 total cc of Kenalog 10 mg/cc was injected into approximately 20 lesions on the scalp.  The patient tolerated the procedure well and left the Dermatology clinic in good condition.    Oliva Tucker MD    Department of Dermatology  [facility]: Phone: [phone], Fax: [phone]  1/31/2020

## 2020-01-29 NOTE — PROGRESS NOTES
[facility] Dermatology Note      Dermatology Problem List:  1. Lichen planopilaris  - Current tx: resumed Plaquenil Jan 2020, ILK 10mg/cc q 4-6 weeks, fluocinolone 0.01% to scalp twice weekly, clobetasol 0.05% solution as needed for pruritus, ketoconazole shampoo occasionally   - Pursuing PRP treatment, hopefully in spring 2020  - Past tx: plaquenil 200 mg daily (d/c July 2018, resumed Jan 2020 as above), LLLT, Benadryl and Allegra for pruritus, fluocinolone oil, gabapentin     2. ACD: patch testing 8/9/2017  - mild positive reaction to nickel, linalool, methylisothiezolinone, sodium metabisulfite, methyldibromoglutaronitrile/phenoxyethanol, benzisothiazolinone, disperse dye mix, gold, p-methylaminophenol sulphate (Metol), and iodine.  - It was determined that many of her products contained these ingredients and recommended that she use products off of the [place] safe list.    Encounter Date: Jan 29, 2020    CC:  Chief Complaint   Patient presents with     Hair Loss     Lichen planopilaris - Chitra notes no change since her last visit.         History of Present Illness:  Ms. Chitra Monet is a 64 year old female who presents as a follow-up for lichen planopilaris. The patient was last seen on 12/13/2019 when PRP treatment was discussed. PRP was planned for 12/14/2019 but patient canceled due to cost. She has continued to consider and would now like to reschedule and hopes to pursue PRP this spring.     Since her last visit, she reports worsening hair loss, which became especially apparent when she had her hair cut 1 week ago, especially on the top back of the head. She is having itching and inflammation of the scalp and reports scalp bumps she can feel off and on. She continues to use clobetasol for itching which helps. Not using fluocinolone as often due to its oily effect, likely 1-2 times since last month. Using ketoconazole shampoo occasionally, once or twice since last visit. Dislikes shampoo  because it made her hair oily and darker. Interested in Kenalog injections today. Patient is open to restarting Plaquenil and reports a recent weight of 133 lbs. She doesn't recall why she stopped Plaquenil in the past - per prior notes, stopped as she thought it made her scalp more red.    Otherwise feeling well, no additional skin concerns.     Past Medical History:   Patient Active Problem List   Diagnosis     Lichen plano-pilaris     Dermatitis     Pruritus     Allergic dermatitis due to other chemical product     Past Medical History:   Diagnosis Date     Constipation     CHRONIC     Cough     CHRONIC     Migraine, unspecified, without mention of intractable migraine without mention of status migrainosus      Unspecified hypothyroidism      Past Surgical History:   Procedure Laterality Date     BIOPSY      BREAST     COLONOSCOPY  6/6/2013    Procedure: COLONOSCOPY;  COLONOSCOPY;  Surgeon: Caridad Young MD;  [facility]: [facility]     COSMETIC SURGERY      bleph 2 weeks ago     ENT SURGERY      TONSILLECTOMY     GYN SURGERY      LAPAROSCOPY       Social History:  Patient reports that she has never smoked. She has never used smokeless tobacco. She reports current alcohol use. She reports that she does not use drugs.    Family History:  Family History   Problem Relation Age of Onset     Melanoma No family hx of      Skin Cancer No family hx of        Medications:  Current Outpatient Medications   Medication Sig Dispense Refill     albuterol (ALBUTEROL) 108 (90 BASE) MCG/ACT inhaler Inhale 2 puffs into the lungs as needed. Patient uses 4 days per week.       BuPROPion HCl (WELLBUTRIN PO) Take 50 mg by mouth daily       clobetasol (TEMOVATE) 0.05 % external solution Apply topically as needed 50 mL 3     fexofenadine (ALLEGRA ALLERGY) 180 MG tablet Take 1 tablet (180 mg) by mouth daily 60 tablet 3     Fluocinolone Acetonide Scalp (DERMA-SMOOTHE/FS SCALP) 0.01 % OIL oil One to 2 mil to scalp once to twice a week for a  minimum of 4 hours and up to overnight 60 mL 5     Fluocinolone Acetonide Scalp 0.01 % OIL oil Apply topically once a week Use one capful on affected areas of scalp at bedtime once weekly, may increase to twice weekly as needed 118 mL 1     hydroxychloroquine (PLAQUENIL) 200 MG tablet Take 1 tablet (200 mg) by mouth 2 times daily 60 tablet 3     ketoconazole (NIZORAL) 2 % external shampoo Apply to scalp, lather, massage into skin, leave on for 2-3 minutes, then rinse, do up to 5 times per week 120 mL 11     Multiple Vitamin (MULTIVITAMINS PO) Take  by mouth daily.       NONFORMULARY One scalp prosthesis for scalp hair loss, lichen plano pilaris 1 each 5     NONFORMULARY One scalp prosthesis 1 each 1     NONFORMULARY One full scalp cranial prosthesis 1 each 5     NONFORMULARY One low level laser light 1 each 2     SUMAtriptan Succinate (IMITREX PO) Take 100 mg by mouth as needed.       tacrolimus (PROTOPIC) 0.1 % ointment Apply a thin layer to frontal scalp area before applying hair spray 60 g 1     Thyroid (NATURE-THROID) 81.25 MG TABS Take by mouth daily       Allergies   Allergen Reactions     Gold Salts Rash     Mild positive reaction to gold from derm patch testing      Iodine Rash     Mild positive reaction to derm patch testing     Linalool Rash     Mild positive reaction from derm patch testing     Methylisothiazolinone Rash     Mild positive reaction from derm patch testing and also to phenoxy ethanol     Nickel Rash     Derm patch test results= mild positive reaction     No Clinical Screening - See Comments Rash     Results of derm skin patch testing :  Mild Positive reactions = Benzisothiazolinone , disperse dye mix  And P-methylaminophenol shlphat (metol).                                                              Doubtful reactions ( possible irritations )= Balsam of [place] and Balsam Sherif     Shellac Rash     Doubtful reaction ( possible irritation) from derm patch testing results     Sodium Benzoate  Rash     Doubtful reaction ( possible irritation) results from derm patch testing     Sodium Metabisulfite Rash     Mild positive reaction from derm patch testing         Review of Systems:  -As per HPI  -Constitutional: Otherwise feeling well today, in usual state of health.  -Skin: As above in HPI. No additional skin concerns.    Physical exam:  GEN: This is a well developed, well-nourished female in no acute distress, in a pleasant mood.    SKIN: Focused examination of the scalp, eyebrows, eyelashes, and fingernails was performed.  - Vertex scalp with diffuse perifollicular erythema and scaling. Scattered patches of alopecia with loss of follicular ostia.  -No other lesions of concern on areas examined.     Impression/Plan:  1. Lichen planopilaris, with ongoing activity on exam. Given severity, we discussed resuming Plaquenil which she is open to as she does not recall why she stopped in past and prior note reported increased redness with Plaquenil as side effect. Will also continue topicals as below - as she dislikes ketoconazole shampoo, advised could use a shampoo containing pyrithione zinc. Will also schedule for PRP when able. Lastly will repeat ILK today.  - Labs today: CBC, CMP.  - Plan for repeat eye exam.  - Resume Plaquenil total dose of 300 mg daily based on 5 mg/kg weight-based dosing (either 100 mg in AM and 200 mg in PM if can cut tabs otherwise could alternate 200 mg BID with 200 mg daily).  - Continue Fluocinolone 0.01% solution to scalp twice weekly  - Discontinue ketoconazole shampoo as does not like   - Start pyrithione zinc shampoo daily  - Continue Clobetasol solution daily prn for scalp pruritis  - Schedule PRP for this spring if possible, April is full so patient will need to call for May when schedule opens  - Kenalog intralesional injection procedure note (performed by faculty): After verbal consent and discussion of risks including but not limited to atrophy, pain, and bruising,  time  out was performed, 20 total cc of Kenalog 10 mg/cc was injected into 20 sites on the vertex and parietal scalp. The patient tolerated the procedure well and left the Dermatology clinic in good condition.    CC Amy Garcia MD  [facility] [employer] [facility]  [address]  [place] [address] on close of this encounter.    Follow-up in 2 months with Dr. Quinn as planned, earlier for new or changing lesions.     Staff Involved:  I, Shante Man, MS4, saw and examined the patient in the presence of Dr. Tucker.    Staff Physician:  I was present with the medical student who participated in the service and in the documentation of the note. I have verified the history and personally performed the physical exam and medical decision making. I agree with the assessment and plan of care as documented in the note.     The procedure(s) was(were) performed by myself.  Kenlog(IL) procedure note: After verbal consent and cleansing with isopropyl alcohol, 2.0 total cc of Kenalog 10 mg/cc was injected into approximately 20 lesions on the scalp.  The patient tolerated the procedure well and left the Dermatology clinic in good condition.    Oliva Tucker MD    Department of Dermatology  [facility]: Phone: [phone], Fax: [phone]  1/31/2020

## 2020-01-29 NOTE — NURSING NOTE
Drug Administration Record    Prior to injection, verified patient identity using patient's name and date of birth.  Due to injection administration, patient instructed to remain in clinic for 15 minutes  afterwards, and to report any adverse reaction to me immediately.    Drug Name: triamcinolone acetonide(kenalog)  Dose: 2mL of triamcinolone 10mg/mL, 20mg dose  Route administered: ID  NDC #: [id number]: Kenalog-10 (8246-5733-01)  Amount of waste(mL):3  Reason for waste: Multi dose vial    LOT #: [id number]  SITE: Scalp  : [employer]  EXPIRATION DATE: 05/2021

## 2020-04-02 ENCOUNTER — TELEPHONE (OUTPATIENT)
Dept: DERMATOLOGY | Facility: CLINIC | Age: 65
End: 2020-04-02

## 2020-04-02 NOTE — TELEPHONE ENCOUNTER
LVM for pt to contact clinic. Please ask Chitra if she is willing to do either a telephone or Preferably a video visit. If she is unable to we are scheduling out into August at this time.

## 2020-04-03 NOTE — TELEPHONE ENCOUNTER
I called and left Chitra morel  asking for them to give us a call back in regards to their upcoming appointment . Due to the COVID-19  virus we are reducing the traffic at the [facility] and asking patients who do not need to be seen in a urgent matter if they can reschedule for 4-6 weeks orto do a telephone visit/ video visit. Clinic number provided and notified that they can respond via [employer] if needed.

## 2020-04-06 ENCOUNTER — VIRTUAL VISIT (OUTPATIENT)
Dept: DERMATOLOGY | Facility: CLINIC | Age: 65
End: 2020-04-06
Payer: COMMERCIAL

## 2020-04-06 DIAGNOSIS — L23.5 ALLERGIC DERMATITIS DUE TO OTHER CHEMICAL PRODUCT: Primary | ICD-10-CM

## 2020-04-06 DIAGNOSIS — L66.10 LICHEN PLANOPILARIS: ICD-10-CM

## 2020-04-06 RX ORDER — HYDROXYCHLOROQUINE SULFATE 200 MG/1
TABLET, FILM COATED ORAL
Qty: 90 TABLET | Refills: 3 | Status: SHIPPED | OUTPATIENT
Start: 2020-04-06 | End: 2020-10-13

## 2020-04-06 ASSESSMENT — PAIN SCALES - GENERAL: PAINLEVEL: NO PAIN (0)

## 2020-04-06 NOTE — NURSING NOTE
Dermatology Rooming Note    Chitra Monet's goals for this visit include:   Chief Complaint   Patient presents with     Hair Loss     Chitra is doing a telephone hair loss follow up      THAO Hunt

## 2020-04-06 NOTE — PROGRESS NOTES
[facility]  [facility]       Impression and Recommendations (Patient Counseled on the Following):  1. Lichen planopilaris  - Current tx: resumed Plaquenil Jan 2020, ILK 10mg/cc q 4-6 weeks, fluocinolone 0.01% to scalp twice weekly, clobetasol 0.05% solution as needed for pruritus, ketoconazole shampoo every other day   - Pursuing PRP treatment, hopefully in spring/summer 2020  - Past tx: plaquenil 200 mg daily (d/c July 2018, resumed Jan 2020 as above), LLLT, Benadryl and Allegra for pruritus, fluocinolone oil, gabapentin    The patient is stable today with notable improvement, LPPAI score is likely around 0.67 (perifollicular scale and erythema). She is considering PRP but will hold off for return visit to discuss further. In the meantime she is going to shower and washer her hair more frequently, get more sleep, and continue 300 mg daily plaquenil. She requests a refill of plaquenil today.  She will also continue to avoid her known allergens.    Follow-up:   June 2020      Staff and resident:    Adry Fischer  PGY-3 Dermatology Resident  [facility] Department of Dermatology     Patient was seen and examined with the dermatology resident. I agree with the history, review of systems, physical examination, assessments and plan.    Rossana Quinn MD  Professor and  Chair  Department of Dermatology  [facility]        _____________________________________________________________________________    Dermatology Problem List:  1. Lichen planopilaris  - Current tx: resumed Plaquenil Jan 2020, ILK 10mg/cc q 4-6 weeks, fluocinolone 0.01% to scalp twice weekly, clobetasol 0.05% solution as needed for pruritus, ketoconazole shampoo every other day   - Pursuing PRP treatment, hopefully in spring/summer 2020  - Past tx: plaquenil 200 mg daily (d/c July 2018, resumed Jan 2020 as above), LLLT, Benadryl and Allegra for pruritus, fluocinolone oil, gabapentin     2. ACD: patch testing  8/9/2017  - mild positive reaction to nickel, linalool, methylisothiezolinone, sodium metabisulfite, methyldibromoglutaronitrile/phenoxyethanol, benzisothiazolinone, disperse dye mix, gold, p-methylaminophenol sulphate (Metol), and iodine.  - It was determined that many of her products contained these ingredients and recommended that she use products off of the [employer] safe list.     Encounter Date: Apr 6, 2020    CC:   Chief Complaint   Patient presents with     Hair Loss     Chitra is doing a telephone hair loss follow up        History of Present Illness:  We have reviewed the teledermatology  information and the nursing intake corresponding to this issue. Chitra Monet is a 64 year old female who presents via teledermatology for follow up of LPP. The patient was last seen 01/2019 when she was experiencing a significant increase in disease activity and we recommended resuming 300 mg daily of hydroxychloroquine (5mg/kg/day dosing, 133 lb) in addition to: Fluocinolone 0.01% solution to scalp twice weekly, a pyrithione zinc shampoo daily (she has been using a medicated free and clear shampoo with pyrithionine zinc),  and clobetasol solution daily prn for scalp pruritus. She also received some scalp injections of ILK at that visit and was contemplating PRP. Since we saw her last, her hair loss and scalp symptoms have improved. Today she has no burning or pain, but does note she had some of this yesterday. She does notice some headaches but does have a history of migraine headaches. She does still need to get a repeat eye exam for this year. She has been using free and clear medicated shampoo and her symptoms seem to be better if she showers more often. She was unable to upload photos for today's visit but does feel elevated bumps and notices some scale of the scalp when she runs her fingers through her hair. She notes almost immediate relief of itching with the clobetasol solution which she uses maybe a couple of times  a week. The patient is well today in their baseline state of health, with no additional skin concerns.        CC Amy Garcia MD  [employer] [facility]  [address]  [place] [address] on close of this encounter.     Follow-up in 2 months with Dr. Quinn as planned, earlier for new or changing lesions.      Staff Involved:  ROS: Patient is generally feeling well today     Physical Examination:  Skin: unable to obtain photos from patient for today's visit    Labs:NA, prior CBC/CMP WNL    Past Medical History:   Patient Active Problem List   Diagnosis     Lichen plano-pilaris     Dermatitis     Pruritus     Allergic dermatitis due to other chemical product     Past Medical History:   Diagnosis Date     Constipation     CHRONIC     Cough     CHRONIC     Migraine, unspecified, without mention of intractable migraine without mention of status migrainosus      Unspecified hypothyroidism      Past Surgical History:   Procedure Laterality Date     BIOPSY      BREAST     COLONOSCOPY  6/6/2013    Procedure: COLONOSCOPY;  COLONOSCOPY;  Surgeon: Caridad Young MD;  Location: [facility]     COSMETIC SURGERY      bleph 2 weeks ago     ENT SURGERY      TONSILLECTOMY     GYN SURGERY      LAPAROSCOPY       Social History:  Lives at home    Family History:  Family History   Problem Relation Age of Onset     Melanoma No family hx of      Skin Cancer No family hx of        Medications:  Current Outpatient Medications   Medication     albuterol (ALBUTEROL) 108 (90 BASE) MCG/ACT inhaler     BuPROPion HCl (WELLBUTRIN PO)     clobetasol (TEMOVATE) 0.05 % external solution     fexofenadine (ALLEGRA ALLERGY) 180 MG tablet     Fluocinolone Acetonide Scalp (DERMA-SMOOTHE/FS SCALP) 0.01 % OIL oil     Fluocinolone Acetonide Scalp 0.01 % OIL oil     hydroxychloroquine (PLAQUENIL) 200 MG tablet     hydroxychloroquine (PLAQUENIL) 200 MG tablet     ketoconazole (NIZORAL) 2 % external shampoo     Multiple Vitamin (MULTIVITAMINS  PO)     NONFORMULARY     NONFORMULARY     NONFORMULARY     NONFORMULARY     SUMAtriptan Succinate (IMITREX PO)     tacrolimus (PROTOPIC) 0.1 % ointment     Thyroid (NATURE-THROID) 81.25 MG TABS     No current facility-administered medications for this visit.           Allergies   Allergen Reactions     Gold Salts Rash     Mild positive reaction to gold from derm patch testing      Iodine Rash     Mild positive reaction to derm patch testing     Linalool Rash     Mild positive reaction from derm patch testing     Methylisothiazolinone Rash     Mild positive reaction from derm patch testing and also to phenoxy ethanol     Nickel Rash     Derm patch test results= mild positive reaction     No Clinical Screening - See Comments Rash     Results of derm skin patch testing :  Mild Positive reactions = Benzisothiazolinone , disperse dye mix  And P-methylaminophenol shlphat (metol).                                                              Doubtful reactions ( possible irritations )= Balsam of [place] and Balsam [place]     Shellac Rash     Doubtful reaction ( possible irritation) from derm patch testing results     Sodium Benzoate Rash     Doubtful reaction ( possible irritation) results from derm patch testing     Sodium Metabisulfite Rash     Mild positive reaction from derm patch testing         _____________________________________________________________________________    Teledermatology information:  - Location of patient: Home  - Patient presented as: return  - Location of teledermatologist:  ([facility] DERMATOLOGY )  - Reason teledermatology is appropriate:  of National Emergency Regarding Coronavirus disease (COVID 19) Outbreak  - Method of transmission:  no photos  - Image quality and interpretability: unable to interpret-please re-submit consult  - Physician has received verbal consent for a Video/Photos Visit from the patient? Yes  - In-person dermatology visit recommendation: no  - Date of images: NA  - Service  start time:14:49   - Service end time:15:30  - Date of report: 04/06/20

## 2020-04-06 NOTE — PATIENT INSTRUCTIONS
[facility] Teledermatology Visit    Thank you for allowing us to participate in your care. Your findings, instructions and follow-up plan are as follows:    Lichen planopilaris and Allergic Contact Dermatitis    The patient is stable today with notable improvement. She is considering PRP but will hold off for return visit to discuss further. In the meantime she is going to shower and washer her hair more frequently, get more sleep, and continue 300 mg daily plaquenil. She requests a refill of plaquenil today.  She will also continue to avoid her known allergens.        Follow-up:   June 2020       When should I call my doctor?    If you are worsening or not improving, please, contact us or seek urgent care as noted below.     Who should I call with questions?    [facility]: [phone]     [facility]: [phone]    If this is a medical emergency and you are unable to reach an ER, Call [phone]

## 2020-06-04 ENCOUNTER — TELEPHONE (OUTPATIENT)
Dept: DERMATOLOGY | Facility: CLINIC | Age: 65
End: 2020-06-04

## 2020-06-04 NOTE — TELEPHONE ENCOUNTER
Chitra was scheduled for an in-person appointment on Tuesday, June 9th.    Informed that [facility] services are temporarily suspended, and no visitors are allowed in the building due to Covid-19.   Patient verbalized understanding.

## 2020-06-09 ENCOUNTER — OFFICE VISIT (OUTPATIENT)
Dept: DERMATOLOGY | Facility: CLINIC | Age: 65
End: 2020-06-09
Payer: COMMERCIAL

## 2020-06-09 DIAGNOSIS — L66.10 LICHEN PLANO-PILARIS: ICD-10-CM

## 2020-06-09 DIAGNOSIS — Z51.81 MEDICATION MONITORING ENCOUNTER: Primary | ICD-10-CM

## 2020-06-09 DIAGNOSIS — L65.9 LOSS OF HAIR: ICD-10-CM

## 2020-06-09 ASSESSMENT — PAIN SCALES - GENERAL: PAINLEVEL: NO PAIN (0)

## 2020-06-09 NOTE — NURSING NOTE
Drug Administration Record    Prior to injection, verified patient identity using patient's name and date of birth.  Due to injection administration, patient instructed to remain in clinic for 15 minutes  afterwards, and to report any adverse reaction to me immediately.    Drug Name: triamcinolone acetonide(kenalog)  Dose: 3mL of triamcinolone 10mg/mL, 30mg dose  Route administered: ID  NDC #: Kenalog-40 (1239-2724-66) or Kenalog-10 (5261-3258-53)  Amount of waste(mL):2  Reason for waste: Multi dose vial    LOT #: [id number]  SITE: Scalp  : [email or web address]  EXPIRATION DATE: 08/2021

## 2020-06-09 NOTE — LETTER
6/9/2020       RE: Chitra Monet  [address]  [address]  [facility] [address]     Dear Colleague,    Thank you for referring your patient, Chitra Monet, to the [facility] DERMATOLOGY at [facility]. Please see a copy of my visit note below.    See completed note.      [facility] Dermatology Note      Dermatology Problem List:  1. 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   - Pursuing PRP treatment, hopefully summer 2020  - s/p ILK 10 mg/cc q4-6wk including today  - Past tx: plaquenil 200 mg daily (d/c July 2018, resumed Jan 2020 as above), LLLT, Benadryl and Allegra for pruritus, fluocinolone oil, gabapentin, ketoconazole shampoo (patient disliked product)  - Plaquenil safety labs ordered 6/9/20: CMP, CBC w/diff. Vitamin D, iron panel, TSH w/free T4 also ordered.   - Photodocumentation     2. ACD: patch testing 8/9/2017  - mild positive reaction to nickel, linalool, methylisothiezolinone, sodium metabisulfite, methyldibromoglutaronitrile/phenoxyethanol, benzisothiazolinone, disperse dye mix, gold, p-methylaminophenol sulphate (Metol), and iodine.  - It was determined that many of her products contained these ingredients and recommended that she use products off of the [employer] safe list.    Encounter Date: Jun 9, 2020    CC:  Chief Complaint   Patient presents with     Hair Loss     Lichen planopilaris - Chitra states her hair has been terrible         History of Present Illness:  Ms. Chitra Monet is a 65 year old female who presents as a follow-up for lichen planopilaris. The patient was last seen via teledermatology 4/6/20 when she continued her current regimen.     Today the patient reports her hair loss is  worsening. At her visit in April, she was stable but her disease recently flared. She reports mild pruritus and denies pain. She also notes her scalp is red and scaly and that her hair has become frizzier on the upper posterior scalp. 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 She shampoos daily with T/Sal shampoo and Free and Clear shampoo. She has continued taking Plaquenil 300 mg daily, fluocinolone acetonide 0.01% oil to scalp twice weekly, and clobetasol 0.05% solution as needed for pruritus.     Otherwise the patient is feeling well without additional skin concerns at this time.     Past Medical History:   Patient Active Problem List   Diagnosis     Lichen plano-pilaris     Dermatitis     Pruritus     Allergic dermatitis due to other chemical product     Past Medical History:   Diagnosis Date     Constipation     CHRONIC     Cough     CHRONIC     Migraine, unspecified, without mention of intractable migraine without mention of status migrainosus      Unspecified hypothyroidism      Past Surgical History:   Procedure Laterality Date     BIOPSY      BREAST     COLONOSCOPY  6/6/2013    Procedure: COLONOSCOPY;  COLONOSCOPY;  Surgeon: Caridad Young MD;  Location: [facility]     COSMETIC SURGERY      bleph 2 weeks ago     ENT SURGERY      TONSILLECTOMY     GYN SURGERY      LAPAROSCOPY       Social History:  Patient reports that she has never smoked. She has never used smokeless tobacco. She reports current alcohol use. She reports that she does not use drugs.    Family History:  Family History   Problem Relation Age of Onset     Melanoma No family hx of      Skin Cancer No family hx of        Medications:  Current Outpatient Medications   Medication Sig Dispense Refill     albuterol (ALBUTEROL) 108 (90 BASE) MCG/ACT inhaler Inhale 2 puffs into the lungs as needed. Patient uses 4 days per week.       BuPROPion HCl (WELLBUTRIN  PO) Take 50 mg by mouth daily       clobetasol (TEMOVATE) 0.05 % external solution Apply topically as needed 50 mL 3     fexofenadine (ALLEGRA ALLERGY) 180 MG tablet Take 1 tablet (180 mg) by mouth daily 60 tablet 3     Fluocinolone Acetonide Scalp (DERMA-SMOOTHE/FS SCALP) 0.01 % OIL oil One to 2 mil to scalp once to twice a week for a minimum of 4 hours and up to overnight 60 mL 5     Fluocinolone Acetonide Scalp 0.01 % OIL oil Apply topically once a week Use one capful on affected areas of scalp at bedtime once weekly, may increase to twice weekly as needed 118 mL 1     hydroxychloroquine (PLAQUENIL) 200 MG tablet Take 100 mg in AM and 200 mg in PM. 90 tablet 3     hydroxychloroquine (PLAQUENIL) 200 MG tablet Take 1 tablet (200 mg) by mouth 2 times daily 60 tablet 3     ketoconazole (NIZORAL) 2 % external shampoo Apply to scalp, lather, massage into skin, leave on for 2-3 minutes, then rinse, do up to 5 times per week 120 mL 11     Multiple Vitamin (MULTIVITAMINS PO) Take  by mouth daily.       NONFORMULARY One scalp prosthesis for scalp hair loss, lichen plano pilaris 1 each 5     NONFORMULARY One scalp prosthesis 1 each 1     NONFORMULARY One full scalp cranial prosthesis 1 each 5     NONFORMULARY One low level laser light 1 each 2     SUMAtriptan Succinate (IMITREX PO) Take 100 mg by mouth as needed.       tacrolimus (PROTOPIC) 0.1 % ointment Apply a thin layer to frontal scalp area before applying hair spray 60 g 1     Thyroid (NATURE-THROID) 81.25 MG TABS Take by mouth daily       Allergies   Allergen Reactions     Gold Salts Rash     Mild positive reaction to gold from derm patch testing      Iodine Rash     Mild positive reaction to derm patch testing     Linalool Rash     Mild positive reaction from derm patch testing     Methylisothiazolinone Rash     Mild positive reaction from derm patch testing and also to phenoxy ethanol     Nickel Rash     Derm patch test results= mild positive reaction     No  Clinical Screening - See Comments Rash     Results of derm skin patch testing :  Mild Positive reactions = Benzisothiazolinone , disperse dye mix  And P-methylaminophenol shlphat (metol).                                                              Doubtful reactions ( possible irritations )= Balsam of [place] and Balsam [place]     Shellac Rash     Doubtful reaction ( possible irritation) from derm patch testing results     Sodium Benzoate Rash     Doubtful reaction ( possible irritation) results from derm patch testing     Sodium Metabisulfite Rash     Mild positive reaction from derm patch testing         Review of Systems:  -As per HPI  -Constitutional: Otherwise feeling well today, in usual state of health.  -Skin: As above in HPI. No additional skin concerns.    Physical exam:  GEN: This is a well developed, well-nourished female in no acute distress, in a pleasant mood.    SKIN: Focused examination of the scalp and face was performed.   - Mild perifollicular erythema and mild perifollicular scale   - Decreased hair density on vertex region  - Spreading of hair loss on scalp  - LPPAI 1.75  -No other lesions of concern on areas examined.     Impression/Plan:  1. Lichen planopilaris - LPPAI 1.75, spreading   - Kenalog intralesional injection procedure note: After verbal consent and discussion of risks including but not limited to atrophy, pain, and bruising, time out was performed, the patient underwent positioning, 3 total cc of Kenalog 10 mg/cc was injected into 30 site(s) on the scalp. The patient tolerated the procedure well and left the Dermatology clinic in good condition.  - Hold fluocinolone acetonide 0.01% oil and clobetasol solution until phone call follow-up in a couple weeks to discuss restarting steroid products.   - Continue alternating T/Sal shampoo and Free and Clear shampoo  - Continue Plaquenil 300 mg daily  - Plaquenil safety labs ordered 6/9/20: CMP, CBC w/diff. Vitamin D, iron panel, TSH w/free T4  also ordered.  - Photodocumentation    Phone call follow-up in a couple weeks to discuss restarting steroid products. Follow-up with dermatology in approximately 3-4 months. Earlier for new or changing lesions or rash.     Staff Involved:  Scribe/Staff    Scribe Disclosure:  I, Keri Carmina, am serving as a scribe to document services personally performed by Dr. Rossana Quinn MD, based on data collection and the provider's statements to me.     Provider Disclosure:   The documentation recorded by the scribe accurately reflects the services I personally performed and the decisions made by me. ILK injections were done by me.    Rossana Quinn MD  Professor and Chair  Department of Dermatology  [facility]: Phone: [phone], Fax:[phone]  [facility]: Phone: [phone], Fax: [phone]                                           Again, thank you for allowing me to participate in the care of your patient.      Sincerely,    Rossana Quinn MD

## 2020-06-09 NOTE — PATIENT INSTRUCTIONS
1. Lichen planopilaris - LPPAI 1.75, spreading   - Kenalog intralesional injection procedure note: After verbal consent and discussion of risks including but not limited to atrophy, pain, and bruising, time out was performed, the patient underwent positioning, 3 total cc of Kenalog 10 mg/cc was injected into 30 site(s) on the scalp. The patient tolerated the procedure well and left the Dermatology clinic in good condition.  - Hold fluocinolone acetonide 0.01% oil and clobetasol solution until phone call follow-up in a couple weeks to discuss restarting steroid products.   - Continue alternating T/Sal shampoo and Free and Clear shampoo  - Continue Plaquenil 300 mg daily  - Plaquenil safety labs ordered 6/9/20: CMP, CBC w/diff. Vitamin D, iron panel, TSH w/free T4 also ordered.  - Photodocumentation    Phone call follow-up in a couple weeks to discuss restarting steroid products. Follow-up with dermatology in approximately 3-4 months. Earlier for new or changing lesions or rash.

## 2020-06-09 NOTE — PROGRESS NOTES
[facility] Dermatology Note      Dermatology Problem List:  1. 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   - Pursuing PRP treatment, hopefully summer 2020  - s/p ILK 10 mg/cc q4-6wk including today  - Past tx: plaquenil 200 mg daily (d/c July 2018, resumed Jan 2020 as above), LLLT, Benadryl and Allegra for pruritus, fluocinolone oil, gabapentin, ketoconazole shampoo (patient disliked product)  - Plaquenil safety labs ordered 6/9/20: CMP, CBC w/diff. Vitamin D, iron panel, TSH w/free T4 also ordered.   - Photodocumentation     2. ACD: patch testing 8/9/2017  - mild positive reaction to nickel, linalool, methylisothiezolinone, sodium metabisulfite, methyldibromoglutaronitrile/phenoxyethanol, benzisothiazolinone, disperse dye mix, gold, p-methylaminophenol sulphate (Metol), and iodine.  - It was determined that many of her products contained these ingredients and recommended that she use products off of the [place] safe list.    Encounter Date: Jun 9, 2020    CC:  Chief Complaint   Patient presents with     Hair Loss     Lichen planopilaris - Chitra states her hair has been terrible         History of Present Illness:  Ms. Chitra Monet is a 65 year old female who presents as a follow-up for lichen planopilaris. The patient was last seen via teledermatology 4/6/20 when she continued her current regimen.     Today the patient reports her hair loss is worsening. At her visit in April, she was stable but her disease recently flared. She reports mild pruritus and denies pain. She also notes her scalp is red and scaly and that her hair has become frizzier on the upper posterior scalp. 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 She shampoos daily with T/Sal shampoo and Free and Clear shampoo. She has continued taking Plaquenil 300 mg daily, fluocinolone acetonide 0.01% oil to scalp twice weekly, and clobetasol 0.05% solution as needed for pruritus.     Otherwise the patient is feeling well without additional skin concerns at this time.     Past Medical History:   Patient Active Problem List   Diagnosis     Lichen plano-pilaris     Dermatitis     Pruritus     Allergic dermatitis due to other chemical product     Past Medical History:   Diagnosis Date     Constipation     CHRONIC     Cough     CHRONIC     Migraine, unspecified, without mention of intractable migraine without mention of status migrainosus      Unspecified hypothyroidism      Past Surgical History:   Procedure Laterality Date     BIOPSY      BREAST     COLONOSCOPY  6/6/2013    Procedure: COLONOSCOPY;  COLONOSCOPY;  Surgeon: Caridad Young MD;  [facility]     COSMETIC SURGERY      bleph 2 weeks ago     ENT SURGERY      TONSILLECTOMY     GYN SURGERY      LAPAROSCOPY       Social History:  Patient reports that she has never smoked. She has never used smokeless tobacco. She reports current alcohol use. She reports that she does not use drugs.    Family History:  Family History   Problem Relation Age of Onset     Melanoma No family hx of      Skin Cancer No family hx of        Medications:  Current Outpatient Medications   Medication Sig Dispense Refill     albuterol (ALBUTEROL) 108 (90 BASE) MCG/ACT inhaler Inhale 2 puffs into the lungs as needed. Patient uses 4 days per week.       BuPROPion HCl (WELLBUTRIN PO) Take 50 mg by mouth daily       clobetasol (TEMOVATE) 0.05 % external solution Apply topically as needed 50 mL 3     fexofenadine (ALLEGRA ALLERGY) 180 MG tablet Take 1 tablet (180 mg) by mouth daily 60 tablet 3     Fluocinolone Acetonide Scalp (DERMA-SMOOTHE/FS SCALP) 0.01 % OIL oil One to 2 mil to scalp once  to twice a week for a minimum of 4 hours and up to overnight 60 mL 5     Fluocinolone Acetonide Scalp 0.01 % OIL oil Apply topically once a week Use one capful on affected areas of scalp at bedtime once weekly, may increase to twice weekly as needed 118 mL 1     hydroxychloroquine (PLAQUENIL) 200 MG tablet Take 100 mg in AM and 200 mg in PM. 90 tablet 3     hydroxychloroquine (PLAQUENIL) 200 MG tablet Take 1 tablet (200 mg) by mouth 2 times daily 60 tablet 3     ketoconazole (NIZORAL) 2 % external shampoo Apply to scalp, lather, massage into skin, leave on for 2-3 minutes, then rinse, do up to 5 times per week 120 mL 11     Multiple Vitamin (MULTIVITAMINS PO) Take  by mouth daily.       NONFORMULARY One scalp prosthesis for scalp hair loss, lichen plano pilaris 1 each 5     NONFORMULARY One scalp prosthesis 1 each 1     NONFORMULARY One full scalp cranial prosthesis 1 each 5     NONFORMULARY One low level laser light 1 each 2     SUMAtriptan Succinate (IMITREX PO) Take 100 mg by mouth as needed.       tacrolimus (PROTOPIC) 0.1 % ointment Apply a thin layer to frontal scalp area before applying hair spray 60 g 1     Thyroid (NATURE-THROID) 81.25 MG TABS Take by mouth daily       Allergies   Allergen Reactions     Gold Salts Rash     Mild positive reaction to gold from derm patch testing      Iodine Rash     Mild positive reaction to derm patch testing     Linalool Rash     Mild positive reaction from derm patch testing     Methylisothiazolinone Rash     Mild positive reaction from derm patch testing and also to phenoxy ethanol     Nickel Rash     Derm patch test results= mild positive reaction     No Clinical Screening - See Comments Rash     Results of derm skin patch testing :  Mild Positive reactions = Benzisothiazolinone , disperse dye mix  And P-methylaminophenol shlphat (metol).                                                              Doubtful reactions ( possible irritations )= Balsam of [place] and Balsam  [place]     Shellac Rash     Doubtful reaction ( possible irritation) from derm patch testing results     Sodium Benzoate Rash     Doubtful reaction ( possible irritation) results from derm patch testing     Sodium Metabisulfite Rash     Mild positive reaction from derm patch testing         Review of Systems:  -As per HPI  -Constitutional: Otherwise feeling well today, in usual state of health.  -Skin: As above in HPI. No additional skin concerns.    Physical exam:  GEN: This is a well developed, well-nourished female in no acute distress, in a pleasant mood.    SKIN: Focused examination of the scalp and face was performed.   - Mild perifollicular erythema and mild perifollicular scale   - Decreased hair density on vertex region  - Spreading of hair loss on scalp  - LPPAI 1.75  -No other lesions of concern on areas examined.     Impression/Plan:  1. Lichen planopilaris - LPPAI 1.75, spreading   - Kenalog intralesional injection procedure note: After verbal consent and discussion of risks including but not limited to atrophy, pain, and bruising, time out was performed, the patient underwent positioning, 3 total cc of Kenalog 10 mg/cc was injected into 30 site(s) on the scalp. The patient tolerated the procedure well and left the Dermatology clinic in good condition.  - Hold fluocinolone acetonide 0.01% oil and clobetasol solution until phone call follow-up in a couple weeks to discuss restarting steroid products.   - Continue alternating T/Sal shampoo and Free and Clear shampoo  - Continue Plaquenil 300 mg daily  - Plaquenil safety labs ordered 6/9/20: CMP, CBC w/diff. Vitamin D, iron panel, TSH w/free T4 also ordered.  - Photodocumentation    Phone call follow-up in a couple weeks to discuss restarting steroid products. Follow-up with dermatology in approximately 3-4 months. Earlier for new or changing lesions or rash.     Staff Involved:  Scribe/Staff    Scribe Disclosure:  Keri MARTINEZ, am serving as a scribe to  document services personally performed by Dr. Rossana Quinn MD, based on data collection and the provider's statements to me.     Provider Disclosure:   The documentation recorded by the scribe accurately reflects the services I personally performed and the decisions made by me. ILK injections were done by me.    Rossana Quinn MD  Professor and Chair  Department of Dermatology  [facility]: Phone: [phone], Fax:[phone]  [facility]: Phone: [phone], Fax: [phone]

## 2020-06-09 NOTE — NURSING NOTE
Dermatology Rooming Note    Chitra Monet's goals for this visit include:   Chief Complaint   Patient presents with     Hair Loss     Lichen planopilaris - Chitra states her hair has been terrible     Nancy Myles, CMA

## 2020-07-06 ENCOUNTER — OFFICE VISIT (OUTPATIENT)
Dept: DERMATOLOGY | Facility: CLINIC | Age: 65
End: 2020-07-06
Payer: COMMERCIAL

## 2020-07-06 DIAGNOSIS — L23.5 ALLERGIC DERMATITIS DUE TO OTHER CHEMICAL PRODUCT: ICD-10-CM

## 2020-07-06 DIAGNOSIS — L30.9 DERMATITIS: ICD-10-CM

## 2020-07-06 DIAGNOSIS — L66.10 LICHEN PLANO-PILARIS: Primary | ICD-10-CM

## 2020-07-06 RX ORDER — GABAPENTIN 300 MG/1
300 CAPSULE ORAL AT BEDTIME
Qty: 30 CAPSULE | Refills: 1 | Status: SHIPPED | OUTPATIENT
Start: 2020-07-06 | End: 2020-08-19

## 2020-07-06 ASSESSMENT — PAIN SCALES - GENERAL: PAINLEVEL: MILD PAIN (2)

## 2020-07-06 NOTE — LETTER
7/6/2020       RE: Chitra Monet  [address]  [facility] [address]     Dear Colleague,    Thank you for referring your patient, Chitra Monet, to the [facility] DERMATOLOGY at [facility]. Please see a copy of my visit note below.    [facility] Dermatology Note    Dermatology Problem List:   1. 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 neurology specialist in the peripheral nervous system  - Ctx: Plaquenil 300 mg daily, alternating T/Sal shampoo and Free and Clear shampoo   - Hold fluocinolone acetonide 0.01% oil and clobetasol solution while discussing restarting steroid products.   - Considering pursuing PRP treatment, on hold currently  - s/p ILK 10 mg/cc q4-6wk including today   - Past tx: plaquenil 200 mg daily (d/c July 2018, resumed Jan 2020 as above), LLLT, Benadryl and Allegra for pruritus, fluocinolone oil, gabapentin, ketoconazole shampoo (patient disliked product) - Plaquenil safety labs ordered 6/9/20: CMP, CBC w/diff. Vitamin D, iron panel, TSH w/free T4 also ordered. - Photodocumentation    2. ACD: patch testing 8/9/2017  - mild positive reaction to nickel, linalool, methylisothiezolinone, sodium metabisulfite, methyldibromoglutaronitrile/phenoxyethanol, benzisothiazolinone, disperse dye mix, gold, p-methylaminophenol sulphate (Metol), and iodine.  - It was determined that many of her products contained these ingredients and recommended that she use products off of the [place] safe list.    Encounter Date: Jul 6, 2020    CC:   Chief Complaint   Patient presents with     Hair Loss     Chitra is being seen for a hair loss follow up          History of Present Illness:  Ms. Chitra Monet is a 65 year old female who presents as a follow-up for lichen planopilaris. The patient was last seen via teledermatology 4/6/20  when she continued her current regimen.      Today the patient reports her hair loss is improved but continues to have pruritus. At her visit in June she had worsening. She reports mild pruritus and denies pain. She also notes her scalp is red and scaly and that her hair has become frizzier on the upper posterior scalp. 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 She shampoos now every three days (previously daily) with T/Sal shampoo and Free and Clear shampoo antidandruff. She has continued taking Plaquenil 300 mg daily.      Otherwise the patient is feeling well without additional skin concerns at this time.     Past Medical History:   Patient Active Problem List   Diagnosis     Lichen plano-pilaris     Dermatitis     Pruritus     Allergic dermatitis due to other chemical product     Past Medical History:   Diagnosis Date     Constipation     CHRONIC     Cough     CHRONIC     Migraine, unspecified, without mention of intractable migraine without mention of status migrainosus      Unspecified hypothyroidism      Past Surgical History:   Procedure Laterality Date     BIOPSY      BREAST     COLONOSCOPY  6/6/2013    Procedure: COLONOSCOPY;  COLONOSCOPY;  Surgeon: Caridad Young MD;  Location: [facility]     COSMETIC SURGERY      bleph 2 weeks ago     ENT SURGERY      TONSILLECTOMY     GYN SURGERY      LAPAROSCOPY       Social History:  Patient reports that she has never smoked. She has never used smokeless tobacco. She reports current alcohol use. She reports that she does not use drugs.    Family History:  Family History   Problem Relation Age of Onset     Melanoma No family hx of      Skin Cancer No family hx of        Medications:  Current Outpatient Medications   Medication Sig Dispense Refill     albuterol (ALBUTEROL) 108 (90 BASE) MCG/ACT inhaler Inhale 2 puffs into the lungs as needed. Patient uses 4 days per week.        BuPROPion HCl (WELLBUTRIN PO) Take 50 mg by mouth daily       clobetasol (TEMOVATE) 0.05 % external solution Apply topically as needed 50 mL 3     fexofenadine (ALLEGRA ALLERGY) 180 MG tablet Take 1 tablet (180 mg) by mouth daily 60 tablet 3     Fluocinolone Acetonide Scalp (DERMA-SMOOTHE/FS SCALP) 0.01 % OIL oil One to 2 mil to scalp once to twice a week for a minimum of 4 hours and up to overnight 60 mL 5     Fluocinolone Acetonide Scalp 0.01 % OIL oil Apply topically once a week Use one capful on affected areas of scalp at bedtime once weekly, may increase to twice weekly as needed 118 mL 1     hydroxychloroquine (PLAQUENIL) 200 MG tablet Take 100 mg in AM and 200 mg in PM. 90 tablet 3     hydroxychloroquine (PLAQUENIL) 200 MG tablet Take 1 tablet (200 mg) by mouth 2 times daily 60 tablet 3     ketoconazole (NIZORAL) 2 % external shampoo Apply to scalp, lather, massage into skin, leave on for 2-3 minutes, then rinse, do up to 5 times per week 120 mL 11     Multiple Vitamin (MULTIVITAMINS PO) Take  by mouth daily.       NONFORMULARY One scalp prosthesis for scalp hair loss, lichen plano pilaris 1 each 5     NONFORMULARY One scalp prosthesis 1 each 1     NONFORMULARY One full scalp cranial prosthesis 1 each 5     NONFORMULARY One low level laser light 1 each 2     SUMAtriptan Succinate (IMITREX PO) Take 100 mg by mouth as needed.       tacrolimus (PROTOPIC) 0.1 % ointment Apply a thin layer to frontal scalp area before applying hair spray 60 g 1     Thyroid (NATURE-THROID) 81.25 MG TABS Take by mouth daily          Allergies   Allergen Reactions     Gold Salts Rash     Mild positive reaction to gold from derm patch testing      Iodine Rash     Mild positive reaction to derm patch testing     Linalool Rash     Mild positive reaction from derm patch testing     Methylisothiazolinone Rash     Mild positive reaction from derm patch testing and also to phenoxy ethanol     Nickel Rash     Derm patch test results= mild  positive reaction     No Clinical Screening - See Comments Rash     Results of derm skin patch testing :  Mild Positive reactions = Benzisothiazolinone , disperse dye mix  And P-methylaminophenol shlphat (metol).                                                              Doubtful reactions ( possible irritations )= Balsam of [place] and Balsam [place]     Shellac Rash     Doubtful reaction ( possible irritation) from derm patch testing results     Sodium Benzoate Rash     Doubtful reaction ( possible irritation) results from derm patch testing     Sodium Metabisulfite Rash     Mild positive reaction from derm patch testing       Review of Systems:  -As per HPI  -Constitutional: Otherwise feeling well today, in usual state of health.  -HEENT: Patient denies nonhealing oral sores.  -Skin: As above in HPI. No additional skin concerns.    Physical exam:  Vitals: There were no vitals taken for this visit.  GEN: This is a well developed, well-nourished female in no acute distress, in a pleasant mood.    SKIN: Focused examination of the scalp and face was performed.   - Mild perifollicular erythema and mild perifollicular scale   - Decreased hair density on vertex region  - No spreading of hair loss on scalp  - LPPAI 0.67  -No other lesions of concern on areas examined.     Impression/Plan:  1. 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   - Considering pursuing PRP treatment, on hold currently  - s/p ILK 10 mg/cc q4-6wk including today   - Past tx: plaquenil 200 mg daily (d/c July 2018, resumed Jan 2020 as above), LLLT, Benadryl and Allegra for pruritus, fluocinolone oil, gabapentin,  ketoconazole shampoo (patient disliked product) - Plaquenil safety labs ordered 6/9/20: CMP, CBC w/diff. Vitamin D, iron panel, TSH w/free T4 also ordered. - Photodocumentation    -- Kenalog intralesional injection procedure note: After verbal consent and discussion of risks including but not limited to atrophy, pain, and bruising, time out was performed, the patient underwent positioning, 3 total cc of Kenalog 10 mg/cc was injected into 30 site(s) on the scalp. The patient tolerated the procedure well and left the Dermatology clinic in good condition.    2. ACD: patch testing 8/9/2017  - mild positive reaction to nickel, linalool, methylisothiezolinone, sodium metabisulfite, methyldibromoglutaronitrile/phenoxyethanol, benzisothiazolinone, disperse dye mix, gold, p-methylaminophenol sulphate (Metol), and iodine.  - It was determined that many of her products contained these ingredients and recommended that she use products off of the [place] safe list.    Follow-up in 6 weeks, earlier for new or changing lesions.     Dr. Quinn staffed the patient. Facundo Donato PGY3.    Patient was seen and examined with the medicine resident. I agree with the history, review of systems, physical examination, assessments and plan. ILK injections were primarily done by me.    Rossana Quinn MD  Professor and  Chair  Department of Dermatology  [facility]      Again, thank you for allowing me to participate in the care of your patient.      Sincerely,    Rossana Quinn MD

## 2020-07-06 NOTE — NURSING NOTE
Dermatology Rooming Note    Chitra Monet's goals for this visit include:   Chief Complaint   Patient presents with     Hair Loss     Chitra is being seen for a hair loss follow up      THAO Hunt

## 2020-07-06 NOTE — PATIENT INSTRUCTIONS
Please try to see:  Eric Hickman MD, MS  EMG, Neurology, Neuromuscular  Locations  [facility]  [address] [place] [id number]

## 2020-07-06 NOTE — PROGRESS NOTES
[facility] Dermatology Note    Dermatology Problem List:   1. 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   - Considering pursuing PRP treatment, on hold currently  - s/p ILK 10 mg/cc q4-6wk including today   - Past tx: plaquenil 200 mg daily (d/c July 2018, resumed Jan 2020 as above), LLLT, Benadryl and Allegra for pruritus, fluocinolone oil, gabapentin, ketoconazole shampoo (patient disliked product) - Plaquenil safety labs ordered 6/9/20: CMP, CBC w/diff. Vitamin D, iron panel, TSH w/free T4 also ordered. - Photodocumentation    2. ACD: patch testing 8/9/2017  - mild positive reaction to nickel, linalool, methylisothiezolinone, sodium metabisulfite, methyldibromoglutaronitrile/phenoxyethanol, benzisothiazolinone, disperse dye mix, gold, p-methylaminophenol sulphate (Metol), and iodine.  - It was determined that many of her products contained these ingredients and recommended that she use products off of the [place] safe list.    Encounter Date: Jul 6, 2020    CC:   Chief Complaint   Patient presents with     Hair Loss     Chitra is being seen for a hair loss follow up          History of Present Illness:  Ms. Chitra Monet is a 65 year old female who presents as a follow-up for lichen planopilaris. The patient was last seen via teledermatology 4/6/20 when she continued her current regimen.      Today the patient reports her hair loss is improved but continues to have pruritus. At her visit in June she had worsening. She reports mild pruritus and denies pain. She also notes her scalp is red and scaly and that her hair has become  frizzier on the upper posterior scalp. 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 She shampoos now every three days (previously daily) with T/Sal shampoo and Free and Clear shampoo antidandruff. She has continued taking Plaquenil 300 mg daily.      Otherwise the patient is feeling well without additional skin concerns at this time.     Past Medical History:   Patient Active Problem List   Diagnosis     Lichen plano-pilaris     Dermatitis     Pruritus     Allergic dermatitis due to other chemical product     Past Medical History:   Diagnosis Date     Constipation     CHRONIC     Cough     CHRONIC     Migraine, unspecified, without mention of intractable migraine without mention of status migrainosus      Unspecified hypothyroidism      Past Surgical History:   Procedure Laterality Date     BIOPSY      BREAST     COLONOSCOPY  6/6/2013    Procedure: COLONOSCOPY;  COLONOSCOPY;  Surgeon: Caridad Young MD;  Location: [facility]     COSMETIC SURGERY      bleph 2 weeks ago     ENT SURGERY      TONSILLECTOMY     GYN SURGERY      LAPAROSCOPY       Social History:  Patient reports that she has never smoked. She has never used smokeless tobacco. She reports current alcohol use. She reports that she does not use drugs.    Family History:  Family History   Problem Relation Age of Onset     Melanoma No family hx of      Skin Cancer No family hx of        Medications:  Current Outpatient Medications   Medication Sig Dispense Refill     albuterol (ALBUTEROL) 108 (90 BASE) MCG/ACT inhaler Inhale 2 puffs into the lungs as needed. Patient uses 4 days per week.       BuPROPion HCl (WELLBUTRIN PO) Take 50 mg by mouth daily       clobetasol (TEMOVATE) 0.05 % external solution Apply topically as needed 50 mL 3     fexofenadine (ALLEGRA ALLERGY) 180 MG tablet Take 1 tablet (180 mg) by mouth daily 60 tablet 3     Fluocinolone Acetonide Scalp  (DERMA-SMOOTHE/FS SCALP) 0.01 % OIL oil One to 2 mil to scalp once to twice a week for a minimum of 4 hours and up to overnight 60 mL 5     Fluocinolone Acetonide Scalp 0.01 % OIL oil Apply topically once a week Use one capful on affected areas of scalp at bedtime once weekly, may increase to twice weekly as needed 118 mL 1     hydroxychloroquine (PLAQUENIL) 200 MG tablet Take 100 mg in AM and 200 mg in PM. 90 tablet 3     hydroxychloroquine (PLAQUENIL) 200 MG tablet Take 1 tablet (200 mg) by mouth 2 times daily 60 tablet 3     ketoconazole (NIZORAL) 2 % external shampoo Apply to scalp, lather, massage into skin, leave on for 2-3 minutes, then rinse, do up to 5 times per week 120 mL 11     Multiple Vitamin (MULTIVITAMINS PO) Take  by mouth daily.       NONFORMULARY One scalp prosthesis for scalp hair loss, lichen plano pilaris 1 each 5     NONFORMULARY One scalp prosthesis 1 each 1     NONFORMULARY One full scalp cranial prosthesis 1 each 5     NONFORMULARY One low level laser light 1 each 2     SUMAtriptan Succinate (IMITREX PO) Take 100 mg by mouth as needed.       tacrolimus (PROTOPIC) 0.1 % ointment Apply a thin layer to frontal scalp area before applying hair spray 60 g 1     Thyroid (NATURE-THROID) 81.25 MG TABS Take by mouth daily          Allergies   Allergen Reactions     Gold Salts Rash     Mild positive reaction to gold from derm patch testing      Iodine Rash     Mild positive reaction to derm patch testing     Linalool Rash     Mild positive reaction from derm patch testing     Methylisothiazolinone Rash     Mild positive reaction from derm patch testing and also to phenoxy ethanol     Nickel Rash     Derm patch test results= mild positive reaction     No Clinical Screening - See Comments Rash     Results of derm skin patch testing :  Mild Positive reactions = Benzisothiazolinone , disperse dye mix  And P-methylaminophenol shlphat  (metol).                                                              Doubtful reactions ( possible irritations )= Balsam of [place] and Balsam [place]     Shellac Rash     Doubtful reaction ( possible irritation) from derm patch testing results     Sodium Benzoate Rash     Doubtful reaction ( possible irritation) results from derm patch testing     Sodium Metabisulfite Rash     Mild positive reaction from derm patch testing       Review of Systems:  -As per HPI  -Constitutional: Otherwise feeling well today, in usual state of health.  -HEENT: Patient denies nonhealing oral sores.  -Skin: As above in HPI. No additional skin concerns.    Physical exam:  Vitals: There were no vitals taken for this visit.  GEN: This is a well developed, well-nourished female in no acute distress, in a pleasant mood.    SKIN: Focused examination of the scalp and face was performed.   - Mild perifollicular erythema and mild perifollicular scale   - Decreased hair density on vertex region  - No spreading of hair loss on scalp  - LPPAI 0.67  -No other lesions of concern on areas examined.     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   - Considering pursuing PRP treatment, on hold currently  - s/p ILK 10 mg/cc q4-6wk including today   - Past tx: plaquenil 200 mg daily (d/c July 2018, resumed Jan 2020 as above), LLLT, Benadryl and Allegra for pruritus, fluocinolone oil, gabapentin, ketoconazole shampoo (patient disliked product) - Plaquenil safety labs ordered 6/9/20: CMP, CBC w/diff. Vitamin D, iron panel, TSH w/free T4 also ordered. - Photodocumentation    -- Kenalog intralesional  injection procedure note: After verbal consent and discussion of risks including but not limited to atrophy, pain, and bruising, time out was performed, the patient underwent positioning, 3 total cc of Kenalog 10 mg/cc was injected into 30 site(s) on the scalp. The patient tolerated the procedure well and left the Dermatology clinic in good condition.    2. ACD: patch testing 8/9/2017  - mild positive reaction to nickel, linalool, methylisothiezolinone, sodium metabisulfite, methyldibromoglutaronitrile/phenoxyethanol, benzisothiazolinone, disperse dye mix, gold, p-methylaminophenol sulphate (Metol), and iodine.  - It was determined that many of her products contained these ingredients and recommended that she use products off of the [place] safe list.    Follow-up in 6 weeks, earlier for new or changing lesions.     Dr. Quinn staffed the patient. Facundo Donato PGY3.    Patient was seen and examined with the medicine resident. I agree with the history, review of systems, physical examination, assessments and plan. ILK injections were primarily done by me.    Rossana Quinn MD  Professor and  Chair  Department of Dermatology  [facility]

## 2020-07-08 DIAGNOSIS — L65.9 LOSS OF HAIR: ICD-10-CM

## 2020-07-08 DIAGNOSIS — Z51.81 MEDICATION MONITORING ENCOUNTER: ICD-10-CM

## 2020-07-08 LAB
BASOPHILS # BLD AUTO: 0 10E9/L (ref 0–0.2)
BASOPHILS NFR BLD AUTO: 0.3 %
DIFFERENTIAL METHOD BLD: NORMAL
EOSINOPHIL # BLD AUTO: 0.1 10E9/L (ref 0–0.7)
EOSINOPHIL NFR BLD AUTO: 1.2 %
ERYTHROCYTE [DISTWIDTH] IN BLOOD BY AUTOMATED COUNT: 13.5 % (ref 10–15)
HCT VFR BLD AUTO: 38 % (ref 35–47)
HGB BLD-MCNC: 12.9 G/DL (ref 11.7–15.7)
LYMPHOCYTES # BLD AUTO: 1.7 10E9/L (ref 0.8–5.3)
LYMPHOCYTES NFR BLD AUTO: 21.7 %
MCH RBC QN AUTO: 32.5 PG (ref 26.5–33)
MCHC RBC AUTO-ENTMCNC: 33.9 G/DL (ref 31.5–36.5)
MCV RBC AUTO: 96 FL (ref 78–100)
MONOCYTES # BLD AUTO: 0.5 10E9/L (ref 0–1.3)
MONOCYTES NFR BLD AUTO: 6.2 %
NEUTROPHILS # BLD AUTO: 5.5 10E9/L (ref 1.6–8.3)
NEUTROPHILS NFR BLD AUTO: 70.6 %
PLATELET # BLD AUTO: 249 10E9/L (ref 150–450)
RBC # BLD AUTO: 3.97 10E12/L (ref 3.8–5.2)
WBC # BLD AUTO: 7.8 10E9/L (ref 4–11)

## 2020-07-08 PROCEDURE — 36415 COLL VENOUS BLD VENIPUNCTURE: CPT | Performed by: DERMATOLOGY

## 2020-07-08 PROCEDURE — 80050 GENERAL HEALTH PANEL: CPT | Performed by: DERMATOLOGY

## 2020-07-08 PROCEDURE — 82306 VITAMIN D 25 HYDROXY: CPT | Performed by: DERMATOLOGY

## 2020-07-08 PROCEDURE — 82728 ASSAY OF FERRITIN: CPT | Performed by: DERMATOLOGY

## 2020-07-08 PROCEDURE — 83540 ASSAY OF IRON: CPT | Performed by: DERMATOLOGY

## 2020-07-08 PROCEDURE — 83550 IRON BINDING TEST: CPT | Performed by: DERMATOLOGY

## 2020-07-09 LAB
ALBUMIN SERPL-MCNC: 3.1 G/DL (ref 3.4–5)
ALP SERPL-CCNC: 51 U/L (ref 40–150)
ALT SERPL W P-5'-P-CCNC: 31 U/L (ref 0–50)
ANION GAP SERPL CALCULATED.3IONS-SCNC: 3 MMOL/L (ref 3–14)
AST SERPL W P-5'-P-CCNC: 23 U/L (ref 0–45)
BILIRUB SERPL-MCNC: 0.2 MG/DL (ref 0.2–1.3)
BUN SERPL-MCNC: 14 MG/DL (ref 7–30)
CALCIUM SERPL-MCNC: 8 MG/DL (ref 8.5–10.1)
CHLORIDE SERPL-SCNC: 103 MMOL/L (ref 94–109)
CO2 SERPL-SCNC: 28 MMOL/L (ref 20–32)
CREAT SERPL-MCNC: 0.63 MG/DL (ref 0.52–1.04)
FERRITIN SERPL-MCNC: 41 NG/ML (ref 8–252)
GFR SERPL CREATININE-BSD FRML MDRD: >90 ML/MIN/{1.73_M2}
GLUCOSE SERPL-MCNC: 81 MG/DL (ref 70–99)
IRON SATN MFR SERPL: 49 % (ref 15–46)
IRON SERPL-MCNC: 132 UG/DL (ref 35–180)
POTASSIUM SERPL-SCNC: 3.8 MMOL/L (ref 3.4–5.3)
PROT SERPL-MCNC: 5.8 G/DL (ref 6.8–8.8)
SODIUM SERPL-SCNC: 134 MMOL/L (ref 133–144)
TIBC SERPL-MCNC: 272 UG/DL (ref 240–430)
TSH SERPL DL<=0.005 MIU/L-ACNC: 1.94 MU/L (ref 0.4–4)

## 2020-07-10 LAB — DEPRECATED CALCIDIOL+CALCIFEROL SERPL-MC: 37 UG/L (ref 20–75)

## 2020-07-13 ENCOUNTER — MYC MEDICAL ADVICE (OUTPATIENT)
Dept: DERMATOLOGY | Facility: CLINIC | Age: 65
End: 2020-07-13

## 2020-08-16 DIAGNOSIS — L66.10 LICHEN PLANO-PILARIS: ICD-10-CM

## 2020-08-19 RX ORDER — GABAPENTIN 300 MG/1
300 CAPSULE ORAL AT BEDTIME
Qty: 30 CAPSULE | Refills: 0 | Status: SHIPPED | OUTPATIENT
Start: 2020-08-19 | End: 2020-10-13

## 2020-08-19 NOTE — TELEPHONE ENCOUNTER
Received refill request for gabapentin. Patient chart reviewed. Refill accepted with no refills as future rx needs to be managed by [facility] neurology (Dr. Hickman). Rx routed to Dr. Cheyenne Barr MD  Dermatology Resident  [facility]

## 2020-08-19 NOTE — TELEPHONE ENCOUNTER
Gabapentin Oral Capsule 300 MG     Last Written Prescription Date:  7/6/20  Last Fill Quantity: 30,   # refills: 1  Last Office Visit : 7/6/2049-[id number]-zd in 6 weeks  Future Office visit:  0    Routing refill request to provider for review/approval because:  Drug not on the [facility], P or [facility] refill protocol     Last note states..- Adding gabapentin 7/6/20 oral 300mg at bedtime and referring to Eric Hickman MD, [facility] neurology specialist in the peripheral nervous system  Could not find a note in care everywhere from Eric Hickman  Not sure she has seen him yet..

## 2020-08-24 DIAGNOSIS — L30.9 DERMATITIS: ICD-10-CM

## 2020-08-28 DIAGNOSIS — L30.9 DERMATITIS: ICD-10-CM

## 2020-08-28 DIAGNOSIS — L66.10 LICHEN PLANO-PILARIS: ICD-10-CM

## 2020-08-28 RX ORDER — KETOCONAZOLE 20 MG/ML
SHAMPOO TOPICAL
Qty: 120 ML | Refills: 10 | Status: SHIPPED | OUTPATIENT
Start: 2020-08-28

## 2020-09-01 ENCOUNTER — TELEPHONE (OUTPATIENT)
Dept: DERMATOLOGY | Facility: CLINIC | Age: 65
End: 2020-09-01

## 2020-09-01 NOTE — TELEPHONE ENCOUNTER
I called and spoke with [facility] and got her scheduled with Dr. Wong for 9/15 @ 3:15 for a hair loss follow up. Dr. Quinn has no openings within the next couple of months.    THAO Hunt

## 2020-09-01 NOTE — TELEPHONE ENCOUNTER
[facility] Call Center    Phone Message    May a detailed message be left on voicemail: yes     Reason for Call: Symptoms or Concerns     If patient has red-flag symptoms, warm transfer to triage line    Current symptom or concern: Pt asks to be seen in clinic immediately due to increased rate of hair loss.  She says if she waits for the next appt on January 4th she will long since have gone bald.  Please have staff call ASAP    Symptoms have been present for:  3+ years(s)    Has patient previously been seen for this? No    By Rossana Quinn MD    Date: 7/28/2020    Are there any new or worsening symptoms? Yes: increased rate of hair loss      Action Taken: Message routed to:  [facility] dermatology    Travel Screening: Not Applicable

## 2020-09-03 RX ORDER — CLOBETASOL PROPIONATE 0.5 MG/ML
SOLUTION TOPICAL
Qty: 50 ML | Refills: 0 | Status: SHIPPED | OUTPATIENT
Start: 2020-09-03 | End: 2020-10-22

## 2020-10-02 DIAGNOSIS — L30.9 DERMATITIS: ICD-10-CM

## 2020-10-02 DIAGNOSIS — L66.10 LICHEN PLANO-PILARIS: ICD-10-CM

## 2020-10-06 DIAGNOSIS — L66.10 LICHEN PLANO-PILARIS: ICD-10-CM

## 2020-10-07 RX ORDER — CLOBETASOL PROPIONATE 0.5 MG/ML
SOLUTION TOPICAL
Qty: 25 ML | Refills: 0 | OUTPATIENT
Start: 2020-10-07

## 2020-10-07 NOTE — TELEPHONE ENCOUNTER
As the dermatology resident on call, I received this refill request. Per last note on 7/6/2020 with Dr. Quinn, patient was to hold her topical steroids (fluocinolone oil and clobetasol solution). Patient has follow up visit on 10/13/2020 at which time the continued use of topical steroids can be discussed and evaluated with Dr. Quinn.     Vincenzo Jenkins MD, PhD  Medicine-Dermatology PGY-5

## 2020-10-07 NOTE — TELEPHONE ENCOUNTER
Clobetasol Propionate External Solution 0.05 %      Last Written Prescription Date:  9-3-20  Last Fill Quantity:  50 ml,   # refills: 0  Last Office Visit : 7-6-20 ( RTC 6 Weeks)  Future Office visit:  10-13-20    Routing refill request to provider for review/approval because:  ? Julia HOOKER or thomas vance RTC    3 cancelled appt   Derm process #3, Clinic note RTC 6 W

## 2020-10-12 DIAGNOSIS — L66.10 LICHEN PLANO-PILARIS: ICD-10-CM

## 2020-10-12 DIAGNOSIS — L30.9 DERMATITIS: ICD-10-CM

## 2020-10-12 NOTE — TELEPHONE ENCOUNTER
Pt says pharmacy is not getting through to us with her 2 refill requests.  I see one here that did get through (clobetasol (TEMOVATE) 0.05 % external solution), but I do not see the gabapentin (NEURONTIN) 300 MG capsule  Pt says she also requested.

## 2020-10-13 ENCOUNTER — OFFICE VISIT (OUTPATIENT)
Dept: DERMATOLOGY | Facility: CLINIC | Age: 65
End: 2020-10-13
Payer: COMMERCIAL

## 2020-10-13 DIAGNOSIS — L29.9 PRURITUS: ICD-10-CM

## 2020-10-13 DIAGNOSIS — L30.9 DERMATITIS: ICD-10-CM

## 2020-10-13 DIAGNOSIS — L66.10 LICHEN PLANO-PILARIS: ICD-10-CM

## 2020-10-13 DIAGNOSIS — L66.10 LICHEN PLANOPILARIS: ICD-10-CM

## 2020-10-13 PROCEDURE — 11901 INJECT SKIN LESIONS >7: CPT | Mod: GC | Performed by: DERMATOLOGY

## 2020-10-13 PROCEDURE — 99213 OFFICE O/P EST LOW 20 MIN: CPT | Mod: 25 | Performed by: DERMATOLOGY

## 2020-10-13 RX ORDER — CLOBETASOL PROPIONATE 0.5 MG/ML
SOLUTION TOPICAL
Qty: 50 ML | Refills: 3 | Status: CANCELLED | OUTPATIENT
Start: 2020-10-13

## 2020-10-13 RX ORDER — HYDROXYCHLOROQUINE SULFATE 200 MG/1
200 TABLET, FILM COATED ORAL DAILY
Qty: 90 TABLET | Refills: 1 | Status: SHIPPED | OUTPATIENT
Start: 2020-10-13

## 2020-10-13 RX ORDER — GABAPENTIN 300 MG/1
600 CAPSULE ORAL AT BEDTIME
Qty: 90 CAPSULE | Refills: 0 | Status: SHIPPED | OUTPATIENT
Start: 2020-10-13

## 2020-10-13 RX ORDER — FEXOFENADINE HCL 180 MG/1
180 TABLET ORAL DAILY
Qty: 60 TABLET | Refills: 3 | Status: SHIPPED | OUTPATIENT
Start: 2020-10-13

## 2020-10-13 RX ORDER — CLOBETASOL PROPIONATE 0.05 G/100ML
SHAMPOO TOPICAL
Qty: 118 ML | Refills: 4 | Status: SHIPPED | OUTPATIENT
Start: 2020-10-13 | End: 2022-02-06

## 2020-10-13 ASSESSMENT — PAIN SCALES - GENERAL: PAINLEVEL: NO PAIN (0)

## 2020-10-13 NOTE — NURSING NOTE
Dermatology Rooming Note    Chitra Monet's goals for this visit include:   Chief Complaint   Patient presents with     Hair Loss     Lichen planopilaris - no change     Keri Delgadillo, CMA

## 2020-10-13 NOTE — PROGRESS NOTES
[facility] Dermatology Note      Dermatology Problem List:  1. Lichen planopilaris - LPPAI worse at 1.42  - Current tx: plaquenil 200 mg daily, gabapentin 600 mg at bedtime, clobetasol shampoo, prn fluocinolone oil, allegra 180 mg daily  - Considering pursuing PRP treatment, on hold currently  - s/p ILK 10 mg/cc q12 weeks  - Past tx: plaquenil 200 mg daily (d/c July 2018, resumed Jan 2020 as above), LLLT, Benadryl and Allegra for pruritus, fluocinolone oil, gabapentin, ketoconazole shampoo (patient disliked product)   - Plaquenil safety labs ordered 6/9/20     2. ACD: patch testing 8/9/2017  - mild positive reaction to nickel, linalool, methylisothiezolinone, sodium metabisulfite, methyldibromoglutaronitrile/phenoxyethanol, benzisothiazolinone, disperse dye mix, gold, p-methylaminophenol sulphate (Metol), and iodine.  - It was determined that many of her products contained these ingredients and recommended that she use products off of the [place] safe list.    Encounter Date: Oct 13, 2020    CC:   Chief Complaint   Patient presents with     Hair Loss     Lichen planopilaris - no change       History of Present Illness:  Ms. Chitra Monet is a 65 year old female who presents as a follow-up for LPP. The patient was last seen 7/6/2020 when she was noted to have worsening symptoms. She was started on oral gabapentin and continued on plaquenil, T/Sal shampoo, and Free & Clear shampoo. She also has been getting intermittent ILK10 injections. She was also referred to Eric Hickman MD, [facility] neurology specialist in the peripheral nervous system for evaluation. She has not seen the neurologist yet, her appointment is next week.    She reports her hair and scalp is about the same. She feels that her LPP is still flaring with multiple itchy red bumps on the vertex scalp She is taking gabapentin at night and thinks it is helping. She uses a cotton pillowcase to help keep her scalp cool at night. She does  not like the ketoconazole shampoo since it turned her hair dark, she stopped using it since the last visit. She uses an organic shampoo without additives currently. She washes her hair 2-3 times per week. She has not noticed much hairfall. She uses the clobetasol solution intermittently. She stopped plaquenil about 1 week after an episode of hives. She continues to have intermittent episodes of hives. After reading the package insert, she was concerned she was having a medication reaction. She has not used fluocinolone oil in a while since it made her hair too greasy. She is having thick scale on the scalp.    The patient is otherwise in their usual state of health, with no other acute skin concerns.  Some redness about her eyes. Recently had eyelash extensions done.    Past Medical History:   Patient Active Problem List   Diagnosis     Lichen plano-pilaris     Dermatitis     Pruritus     Allergic dermatitis due to other chemical product     Past Medical History:   Diagnosis Date     Constipation     CHRONIC     Cough     CHRONIC     Migraine, unspecified, without mention of intractable migraine without mention of status migrainosus      Unspecified hypothyroidism      Past Surgical History:   Procedure Laterality Date     BIOPSY      BREAST     COLONOSCOPY  6/6/2013    Procedure: COLONOSCOPY;  COLONOSCOPY;  Surgeon: Caridad Young MD;  Location: [facility]     COSMETIC SURGERY      bleph 2 weeks ago     ENT SURGERY      TONSILLECTOMY     GYN SURGERY      LAPAROSCOPY       Social History:  Patient reports that she has never smoked. She has never used smokeless tobacco. She reports current alcohol use. She reports that she does not use drugs.    Family History:  Family History   Problem Relation Age of Onset     Melanoma No family hx of      Skin Cancer No family hx of        Medications:  Current Outpatient Medications   Medication Sig Dispense Refill     albuterol (ALBUTEROL) 108 (90 BASE) MCG/ACT inhaler Inhale 2  puffs into the lungs as needed. Patient uses 4 days per week.       BuPROPion HCl (WELLBUTRIN PO) Take 50 mg by mouth daily       clobetasol (TEMOVATE) 0.05 % external solution Apply topically as needed 50 mL 0     fexofenadine (ALLEGRA ALLERGY) 180 MG tablet Take 1 tablet (180 mg) by mouth daily 60 tablet 3     Fluocinolone Acetonide Scalp (DERMA-SMOOTHE/FS SCALP) 0.01 % OIL oil One to 2 mil to scalp once to twice a week for a minimum of 4 hours and up to overnight 60 mL 5     Fluocinolone Acetonide Scalp 0.01 % OIL oil Apply topically once a week Use one capful on affected areas of scalp at bedtime once weekly, may increase to twice weekly as needed 118 mL 1     gabapentin (NEURONTIN) 300 MG capsule Take 1 capsule (300 mg) by mouth At Bedtime 30 capsule 0     hydroxychloroquine (PLAQUENIL) 200 MG tablet Take 100 mg in AM and 200 mg in PM. 90 tablet 3     hydroxychloroquine (PLAQUENIL) 200 MG tablet Take 1 tablet (200 mg) by mouth 2 times daily 60 tablet 3     ketoconazole (NIZORAL) 2 % external shampoo Apply to scalp, lather, massage into skin, leave on for 2-3 minutes, then rinse, do up to 5 times per week 120 mL 10     Multiple Vitamin (MULTIVITAMINS PO) Take  by mouth daily.       NONFORMULARY One scalp prosthesis for scalp hair loss, lichen plano pilaris 1 each 5     NONFORMULARY One scalp prosthesis 1 each 1     NONFORMULARY One full scalp cranial prosthesis 1 each 5     NONFORMULARY One low level laser light 1 each 2     SUMAtriptan Succinate (IMITREX PO) Take 100 mg by mouth as needed.       tacrolimus (PROTOPIC) 0.1 % ointment Apply a thin layer to frontal scalp area before applying hair spray 60 g 1     Thyroid (NATURE-THROID) 81.25 MG TABS Take by mouth daily          Allergies   Allergen Reactions     Gold Salts Rash     Mild positive reaction to gold from derm patch testing      Iodine Rash     Mild positive reaction to derm patch testing     Linalool Rash     Mild positive reaction from derm patch  testing     Methylisothiazolinone Rash     Mild positive reaction from derm patch testing and also to phenoxy ethanol     Nickel Rash     Derm patch test results= mild positive reaction     No Clinical Screening - See Comments Rash     Results of derm skin patch testing :  Mild Positive reactions = Benzisothiazolinone , disperse dye mix  And P-methylaminophenol shlphat (metol).                                                              Doubtful reactions ( possible irritations )= Balsam of [place] and Balsam [place]     Shellac Rash     Doubtful reaction ( possible irritation) from derm patch testing results     Sodium Benzoate Rash     Doubtful reaction ( possible irritation) results from derm patch testing     Sodium Metabisulfite Rash     Mild positive reaction from derm patch testing         Review of Systems:  -Skin Establ Pt: The patient denies any new rash, pruritus, or lesions that are symptomatic, changing or bleeding, except as per HPI.  -Constitutional: Otherwise feeling well today, in usual state of health.  -Skin: As above in HPI. No additional skin concerns.    Physical exam:  Vitals: There were no vitals taken for this visit.  GEN: This is a well developed, well-nourished female in no acute distress, in a pleasant mood.    SKIN: Examination of the face, scalp, and neck was performed.  - Eyebrows within normal limits, Mild eyelid erythema. Eyelash extensions in place  - Notable hair thinning at the vertex scalp with focal alopecic patches  - No nail pitting   - Negative hair pull test  - Perifollicular scale and erythema noted on the vertex scalp with thicker adherent scale        Labs:  none    Impression/Plan:    1. Lichen Planopilaris, slowly improving  LPPAI score 1.42, slightly worse with erythema, scaling, and burning. The patient is currently only using clobetasol solution, and does not use any medicated shampoos or other topicals. She dislikes products that make her hair greasy. She has stopped  plaquenil due to concerns for hives, however this seems unlikely given how long the patient had been on the medication and continuation of hives even with medication discontinuation. Will plan to restart plaquenil and resume topical medications.    - Patient to follow up with Neurology at [facility] in 1 week  - Increase to gabapentin 600 mg at bedtime for 2 weeks, can add 300 mg in the AM to this regimen if tolerating or increase 900 mg at bedtime   - Start clobetasol shampoo 2-3 times per week, instructions provided in AVS  - Continue Allegra 180 mg daily  - Kenalog intralesional injection procedure note: After verbal consent and discussion of risks including but not limited to atrophy, pain, and bruising, time out was performed, the patient underwent positioning and the area was prepped with isopropyl alcohol, 3.0 total cc of Kenalog 10 mg/cc was injected into 20 site(s) on the scalp. The patient tolerated the procedure well and left the Dermatology clinic in good condition.    Follow-up in 3-4 months, earlier for new or changing lesions with Dr. Cheyenne Wong staffed the patient.    Staff Involved:  Resident(Swapna)/Staff    Arianne Barcenas MD  PGY-4 Medicine-Dermatology  Pager [phone]    Staff Physician Comments:   I saw and evaluated the patient with the resident and I agree with the assessment and plan.  I was present for the entire minor procedure and examination.    Jonathan Wong MD  Pronouns: he/him/his    Department of Dermatology  [facility]: Phone: [phone], Fax:[phone]  [facility]: Phone: [phone] Fax: [phone]

## 2020-10-13 NOTE — PATIENT INSTRUCTIONS
Scalp Care Plan    Wash scalp every 2-3 days with clobetasol shampoo. Apply to dry scalp with some water and leave off for 15 minutes. Then rinse off in the shower and follow with regular conditioner.    You can try to rub drops of dermasmoothe oil into scalp in areas of thick scaling at the top of the scalp at night and wash out in the morning    Resume plaquenil 200 mg daily and monitor for recurrence of hives. Unlikely this is related to this medication.    Increase to gabapentin 600 mg at bedtime for 2 weeks, can add 300 mg in the AM to this regimen if tolerating or change to 900 mg at night    Medications Ordered: Fexofenadine, Plaquenil, Gabapentin, Clobetasol shampoo

## 2020-10-13 NOTE — NURSING NOTE
Drug Administration Record    Prior to injection, verified patient identity using patient's name and date of birth.  Due to injection administration, patient instructed to remain in clinic for 15 minutes  afterwards, and to report any adverse reaction to me immediately.    Drug Name: triamcinolone acetonide(kenalog)  Dose: 3mL of triamcinolone 10mg/mL, 30mg dose  Route administered: ID  NDC #: Kenalog-10 (5644-3652-01)  Amount of waste(mL):2mL  Reason for waste: Multi dose vial    LOT #: [id number]  SITE: skin  : [employer]  EXPIRATION DATE: 3/2022    THAO Hackett

## 2020-10-13 NOTE — LETTER
10/13/2020       RE: Chitra Monet  [address]  [facility] [address]     Dear Colleague,    Thank you for referring your patient, Chitra Monet, to the [facility] DERMATOLOGY CLINIC [place] [facility]. Please see a copy of my visit note below.    [facility] Dermatology Note      Dermatology Problem List:  1. Lichen planopilaris - LPPAI worse at 1.42  - Current tx: plaquenil 200 mg daily, gabapentin 600 mg at bedtime, clobetasol shampoo, prn fluocinolone oil, allegra 180 mg daily  - Considering pursuing PRP treatment, on hold currently  - s/p ILK 10 mg/cc q12 weeks  - Past tx: plaquenil 200 mg daily (d/c July 2018, resumed Jan 2020 as above), LLLT, Benadryl and Allegra for pruritus, fluocinolone oil, gabapentin, ketoconazole shampoo (patient disliked product)   - Plaquenil safety labs ordered 6/9/20     2. ACD: patch testing 8/9/2017  - mild positive reaction to nickel, linalool, methylisothiezolinone, sodium metabisulfite, methyldibromoglutaronitrile/phenoxyethanol, benzisothiazolinone, disperse dye mix, gold, p-methylaminophenol sulphate (Metol), and iodine.  - It was determined that many of her products contained these ingredients and recommended that she use products off of the [place] safe list.    Encounter Date: Oct 13, 2020    CC:   Chief Complaint   Patient presents with     Hair Loss     Lichen planopilaris - no change       History of Present Illness:  Ms. Chitra Monet is a 65 year old female who presents as a follow-up for LPP. The patient was last seen 7/6/2020 when she was noted to have worsening symptoms. She was started on oral gabapentin and continued on plaquenil, T/Sal shampoo, and Free & Clear shampoo. She also has been getting intermittent ILK10 injections. She was also referred to rEic Hickman MD, [facility] neurology specialist in the peripheral nervous system for evaluation. She has not seen the  neurologist yet, her appointment is next week.    She reports her hair and scalp is about the same. She feels that her LPP is still flaring with multiple itchy red bumps on the vertex scalp She is taking gabapentin at night and thinks it is helping. She uses a cotton pillowcase to help keep her scalp cool at night. She does not like the ketoconazole shampoo since it turned her hair dark, she stopped using it since the last visit. She uses an organic shampoo without additives currently. She washes her hair 2-3 times per week. She has not noticed much hairfall. She uses the clobetasol solution intermittently. She stopped plaquenil about 1 week after an episode of hives. She continues to have intermittent episodes of hives. After reading the package insert, she was concerned she was having a medication reaction. She has not used fluocinolone oil in a while since it made her hair too greasy. She is having thick scale on the scalp.    The patient is otherwise in their usual state of health, with no other acute skin concerns.  Some redness about her eyes. Recently had eyelash extensions done.    Past Medical History:   Patient Active Problem List   Diagnosis     Lichen plano-pilaris     Dermatitis     Pruritus     Allergic dermatitis due to other chemical product     Past Medical History:   Diagnosis Date     Constipation     CHRONIC     Cough     CHRONIC     Migraine, unspecified, without mention of intractable migraine without mention of status migrainosus      Unspecified hypothyroidism      Past Surgical History:   Procedure Laterality Date     BIOPSY      BREAST     COLONOSCOPY  6/6/2013    Procedure: COLONOSCOPY;  COLONOSCOPY;  Surgeon: Caridad Young MD;  Location: [facility]     COSMETIC SURGERY      bleph 2 weeks ago     ENT SURGERY      TONSILLECTOMY     GYN SURGERY      LAPAROSCOPY       Social History:  Patient reports that she has never smoked. She has never used smokeless tobacco. She reports current  alcohol use. She reports that she does not use drugs.    Family History:  Family History   Problem Relation Age of Onset     Melanoma No family hx of      Skin Cancer No family hx of        Medications:  Current Outpatient Medications   Medication Sig Dispense Refill     albuterol (ALBUTEROL) 108 (90 BASE) MCG/ACT inhaler Inhale 2 puffs into the lungs as needed. Patient uses 4 days per week.       BuPROPion HCl (WELLBUTRIN PO) Take 50 mg by mouth daily       clobetasol (TEMOVATE) 0.05 % external solution Apply topically as needed 50 mL 0     fexofenadine (ALLEGRA ALLERGY) 180 MG tablet Take 1 tablet (180 mg) by mouth daily 60 tablet 3     Fluocinolone Acetonide Scalp (DERMA-SMOOTHE/FS SCALP) 0.01 % OIL oil One to 2 mil to scalp once to twice a week for a minimum of 4 hours and up to overnight 60 mL 5     Fluocinolone Acetonide Scalp 0.01 % OIL oil Apply topically once a week Use one capful on affected areas of scalp at bedtime once weekly, may increase to twice weekly as needed 118 mL 1     gabapentin (NEURONTIN) 300 MG capsule Take 1 capsule (300 mg) by mouth At Bedtime 30 capsule 0     hydroxychloroquine (PLAQUENIL) 200 MG tablet Take 100 mg in AM and 200 mg in PM. 90 tablet 3     hydroxychloroquine (PLAQUENIL) 200 MG tablet Take 1 tablet (200 mg) by mouth 2 times daily 60 tablet 3     ketoconazole (NIZORAL) 2 % external shampoo Apply to scalp, lather, massage into skin, leave on for 2-3 minutes, then rinse, do up to 5 times per week 120 mL 10     Multiple Vitamin (MULTIVITAMINS PO) Take  by mouth daily.       NONFORMULARY One scalp prosthesis for scalp hair loss, lichen plano pilaris 1 each 5     NONFORMULARY One scalp prosthesis 1 each 1     NONFORMULARY One full scalp cranial prosthesis 1 each 5     NONFORMULARY One low level laser light 1 each 2     SUMAtriptan Succinate (IMITREX PO) Take 100 mg by mouth as needed.       tacrolimus (PROTOPIC) 0.1 % ointment Apply a thin layer to frontal scalp area before  applying hair spray 60 g 1     Thyroid (NATURE-THROID) 81.25 MG TABS Take by mouth daily          Allergies   Allergen Reactions     Gold Salts Rash     Mild positive reaction to gold from derm patch testing      Iodine Rash     Mild positive reaction to derm patch testing     Linalool Rash     Mild positive reaction from derm patch testing     Methylisothiazolinone Rash     Mild positive reaction from derm patch testing and also to phenoxy ethanol     Nickel Rash     Derm patch test results= mild positive reaction     No Clinical Screening - See Comments Rash     Results of derm skin patch testing :  Mild Positive reactions = Benzisothiazolinone , disperse dye mix  And P-methylaminophenol shlphat (metol).                                                              Doubtful reactions ( possible irritations )= Balsam of [place] and Balsam [place]     Shellac Rash     Doubtful reaction ( possible irritation) from derm patch testing results     Sodium Benzoate Rash     Doubtful reaction ( possible irritation) results from derm patch testing     Sodium Metabisulfite Rash     Mild positive reaction from derm patch testing         Review of Systems:  -Skin Establ Pt: The patient denies any new rash, pruritus, or lesions that are symptomatic, changing or bleeding, except as per HPI.  -Constitutional: Otherwise feeling well today, in usual state of health.  -Skin: As above in HPI. No additional skin concerns.    Physical exam:  Vitals: There were no vitals taken for this visit.  GEN: This is a well developed, well-nourished female in no acute distress, in a pleasant mood.    SKIN: Examination of the face, scalp, and neck was performed.  - Eyebrows within normal limits, Mild eyelid erythema. Eyelash extensions in place  - Notable hair thinning at the vertex scalp with focal alopecic patches  - No nail pitting   - Negative hair pull test  - Perifollicular scale and erythema noted on the vertex scalp with thicker adherent  scale        Labs:  none    Impression/Plan:    1. Lichen Planopilaris, slowly improving  LPPAI score 1.42, slightly worse with erythema, scaling, and burning. The patient is currently only using clobetasol solution, and does not use any medicated shampoos or other topicals. She dislikes products that make her hair greasy. She has stopped plaquenil due to concerns for hives, however this seems unlikely given how long the patient had been on the medication and continuation of hives even with medication discontinuation. Will plan to restart plaquenil and resume topical medications.    - Patient to follow up with Neurology at [facility] in 1 week  - Increase to gabapentin 600 mg at bedtime for 2 weeks, can add 300 mg in the AM to this regimen if tolerating or increase 900 mg at bedtime   - Start clobetasol shampoo 2-3 times per week, instructions provided in AVS  - Continue Allegra 180 mg daily  - Kenalog intralesional injection procedure note: After verbal consent and discussion of risks including but not limited to atrophy, pain, and bruising, time out was performed, the patient underwent positioning and the area was prepped with isopropyl alcohol, 3.0 total cc of Kenalog 10 mg/cc was injected into 20 site(s) on the scalp. The patient tolerated the procedure well and left the Dermatology clinic in good condition.    Follow-up in 3-4 months, earlier for new or changing lesions with Dr. Cheyenne Wong staffed the patient.    Staff Involved:  Resident(Swapna)/Staff    Arianne Barcenas MD  PGY-4 Medicine-Dermatology  Pager [phone]    Staff Physician Comments:   I saw and evaluated the patient with the resident and I agree with the assessment and plan.  I was present for the entire minor procedure and examination.    Jonathan Wong MD  Pronouns: he/him/his    Department of Dermatology  [facility]: Phone: [phone],  Fax:[phone]  [facility]: Phone: [phone] Fax: [phone]

## 2020-10-14 RX ORDER — GABAPENTIN 300 MG/1
300 CAPSULE ORAL AT BEDTIME
Qty: 30 CAPSULE | Refills: 0 | OUTPATIENT
Start: 2020-10-14

## 2020-10-15 RX ORDER — CLOBETASOL PROPIONATE 0.5 MG/ML
SOLUTION TOPICAL
Qty: 50 ML | Refills: 0 | OUTPATIENT
Start: 2020-10-15

## 2020-10-20 DIAGNOSIS — L30.9 DERMATITIS: ICD-10-CM

## 2020-10-20 DIAGNOSIS — L66.10 LICHEN PLANO-PILARIS: ICD-10-CM

## 2020-10-22 ENCOUNTER — HOSPITAL ENCOUNTER (OUTPATIENT)
Dept: ULTRASOUND IMAGING | Facility: CLINIC | Age: 65
Discharge: HOME | End: 2020-10-22
Attending: INTERNAL MEDICINE | Admitting: INTERNAL MEDICINE
Payer: COMMERCIAL

## 2020-10-22 DIAGNOSIS — R10.11 ABDOMINAL DISCOMFORT IN RIGHT UPPER QUADRANT: ICD-10-CM

## 2020-10-22 PROCEDURE — 76705 ECHO EXAM OF ABDOMEN: CPT

## 2020-10-22 RX ORDER — CLOBETASOL PROPIONATE 0.5 MG/ML
SOLUTION TOPICAL
Qty: 50 ML | Refills: 0 | Status: SHIPPED | OUTPATIENT
Start: 2020-10-22

## 2020-10-23 ENCOUNTER — TELEPHONE (OUTPATIENT)
Dept: DERMATOLOGY | Facility: CLINIC | Age: 65
End: 2020-10-23

## 2020-10-23 NOTE — TELEPHONE ENCOUNTER
Prior Authorization Retail Medication Request    Medication/Dose: clobetasol propionate (CLOBEX) 0.05 % external shampoo  ICD code (if different than what is on RX):  Lichen plano-pilaris [L66.1]   Previously Tried and Failed: See chart  Rationale:      Insurance Name:  [facility]  Insurance ID:  [id number]

## 2020-10-27 NOTE — TELEPHONE ENCOUNTER
Central Prior Authorization Team   Phone: [phone]      PA Initiation    Medication: clobetasol propionate (CLOBEX) 0.05 % external shampoo  Insurance Company: [employer] - Phone [phone] Fax [phone]  Pharmacy Filling the Rx: [id number] - [place], [place] - [address] [place] [address]  Filling Pharmacy Phone: [phone]  Filling Pharmacy Fax:    Start Date: 10/27/2020

## 2020-10-29 NOTE — TELEPHONE ENCOUNTER
Prior Authorization Approval    Authorization Effective Date: 9/29/2020  Authorization Expiration Date: 10/28/2021  Medication: clobetasol propionate (CLOBEX) 0.05 % external shampoo-[place] approved  Approved Dose/Quantity:   Reference #: [id number]   Insurance Company: [employer] - Phone [phone] Fax [phone]  Expected CoPay:       CoPay Card Available:      Foundation Assistance Needed:    Which Pharmacy is filling the prescription (Not needed for infusion/clinic administered): [id number] - [place], [place] - [address] [place] [address]  Pharmacy Notified: Yes  Patient Notified: No-Pharmacy will contact

## 2020-11-22 ENCOUNTER — MYC REFILL (OUTPATIENT)
Dept: DERMATOLOGY | Facility: CLINIC | Age: 65
End: 2020-11-22

## 2020-11-22 DIAGNOSIS — L30.9 DERMATITIS: ICD-10-CM

## 2020-11-23 NOTE — TELEPHONE ENCOUNTER
tacrolimus (PROTOPIC) 0.1 % ointment       Last Written Prescription Date:  11-9-2017  Last Fill Quantity: 60g,   # refills: 1  Last Office Visit : 10-  Future Office visit:  11-    Routing refill request to provider for review/approval because:  Last ordered in 2017.      Kathleen M Doege RN

## 2020-11-27 RX ORDER — TACROLIMUS 1 MG/G
OINTMENT TOPICAL
Qty: 60 G | Refills: 1 | OUTPATIENT
Start: 2020-11-27

## 2020-11-27 NOTE — TELEPHONE ENCOUNTER
Received refill request for topical tacrolimus as resident on call. Reviewed patient chart. Refill is denied on the basis that the medication was last prescribed in 2017. She was seen as recently as 7/2020 and tacrolimus was not addressed to continue in last note, as patient is currently using topical steroids. Cc'ing attending as FYI only

## 2020-11-30 ENCOUNTER — OFFICE VISIT (OUTPATIENT)
Dept: DERMATOLOGY | Facility: CLINIC | Age: 65
End: 2020-11-30
Payer: COMMERCIAL

## 2020-11-30 DIAGNOSIS — L23.5 ALLERGIC DERMATITIS DUE TO OTHER CHEMICAL PRODUCT: ICD-10-CM

## 2020-11-30 DIAGNOSIS — L66.10 LICHEN PLANO-PILARIS: Primary | ICD-10-CM

## 2020-11-30 DIAGNOSIS — L65.9 LOSS OF HAIR: ICD-10-CM

## 2020-11-30 PROCEDURE — 11901 INJECT SKIN LESIONS >7: CPT | Mod: GC | Performed by: DERMATOLOGY

## 2020-11-30 PROCEDURE — 99213 OFFICE O/P EST LOW 20 MIN: CPT | Mod: 25 | Performed by: DERMATOLOGY

## 2020-11-30 ASSESSMENT — PAIN SCALES - GENERAL: PAINLEVEL: NO PAIN (0)

## 2020-11-30 NOTE — PROGRESS NOTES
[facility] Dermatology Note      Dermatology Problem List:  1. 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 ACD: patch testing 8/9/2017  - mild positive reaction to nickel, linalool, methylisothiezolinone, sodium metabisulfite, methyldibromoglutaronitrile/phenoxyethanol, benzisothiazolinone, disperse dye mix, gold, p-methylaminophenol sulphate (Metol), and iodine.  - It was determined that many of her products contained these ingredients and recommended that she use products off of the [place] safe list.    Encounter Date: Nov 30, 2020    CC:   Chief Complaint   Patient presents with     Hair Loss     Chitra is here today for a hair loss follow up        History of Present Illness:  Ms. Chitra Monet is a 65 year old female who presents as a follow-up for LPP. The patient was last seen 10/2020 when she was noted to have worsening symptoms.     Since her last visit, she stopped gabapentin due to balance issues, self tapered off. Did not think gabapentin was helpful for symptoms. Continues to use clobetasol shampoo once weekly. No major changes in symptoms and continues to have some pain and itching. No prior use of topical gabapentin. No longer taking allegra, she is taking Benadryl. Taking plaquenil 200 mg bid which was restarted one month ago. Reports one episode of irritation with clobex shampoo.    She reports her hair and scalp is about the same.    The patient is otherwise in their usual state of health, with no  other acute skin concerns.      Past Medical History:   Patient Active Problem List   Diagnosis     Lichen plano-pilaris     Dermatitis     Pruritus     Allergic dermatitis due to other chemical product     Past Medical History:   Diagnosis Date     Constipation     CHRONIC     Cough     CHRONIC     Migraine, unspecified, without mention of intractable migraine without mention of status migrainosus      Unspecified hypothyroidism      Past Surgical History:   Procedure Laterality Date     BIOPSY      BREAST     COLONOSCOPY  6/6/2013    Procedure: COLONOSCOPY;  COLONOSCOPY;  Surgeon: Caridad Young MD;  Location: [facility]     COSMETIC SURGERY      bleph 2 weeks ago     ENT SURGERY      TONSILLECTOMY     GYN SURGERY      LAPAROSCOPY       Social History:  Patient reports that she has never smoked. She has never used smokeless tobacco. She reports current alcohol use. She reports that she does not use drugs.    Family History:  Family History   Problem Relation Age of Onset     Melanoma No family hx of      Skin Cancer No family hx of        Medications:  Current Outpatient Medications   Medication Sig Dispense Refill     albuterol (ALBUTEROL) 108 (90 BASE) MCG/ACT inhaler Inhale 2 puffs into the lungs as needed. Patient uses 4 days per week.       BuPROPion HCl (WELLBUTRIN PO) Take 50 mg by mouth daily       clobetasol (TEMOVATE) 0.05 % external solution Apply topically as needed 50 mL 0     clobetasol propionate (CLOBEX) 0.05 % external shampoo Use twice a week 118 mL 4     fexofenadine (ALLEGRA ALLERGY) 180 MG tablet Take 1 tablet (180 mg) by mouth daily 60 tablet 3     Fluocinolone Acetonide Scalp (DERMA-SMOOTHE/FS SCALP) 0.01 % OIL oil One to 2 mil to scalp once to twice a week for a minimum of 4 hours and up to overnight 60 mL 5     Fluocinolone Acetonide Scalp 0.01 % OIL oil Apply topically once a week Use one capful on affected areas of scalp at bedtime once weekly, may increase to twice weekly as needed 118  mL 1     gabapentin (NEURONTIN) 300 MG capsule Take 2 capsules (600 mg) by mouth At Bedtime 90 capsule 0     hydroxychloroquine (PLAQUENIL) 200 MG tablet Take 1 tablet (200 mg) by mouth daily Take 100 mg in AM and 200 mg in PM. 90 tablet 1     hydroxychloroquine (PLAQUENIL) 200 MG tablet Take 1 tablet (200 mg) by mouth 2 times daily 60 tablet 3     ketoconazole (NIZORAL) 2 % external shampoo Apply to scalp, lather, massage into skin, leave on for 2-3 minutes, then rinse, do up to 5 times per week 120 mL 10     Multiple Vitamin (MULTIVITAMINS PO) Take  by mouth daily.       NONFORMULARY One scalp prosthesis for scalp hair loss, lichen plano pilaris 1 each 5     NONFORMULARY One scalp prosthesis 1 each 1     NONFORMULARY One full scalp cranial prosthesis 1 each 5     NONFORMULARY One low level laser light 1 each 2     SUMAtriptan Succinate (IMITREX PO) Take 100 mg by mouth as needed.       tacrolimus (PROTOPIC) 0.1 % ointment Apply a thin layer to frontal scalp area before applying hair spray 60 g 1     Thyroid (NATURE-THROID) 81.25 MG TABS Take by mouth daily          Allergies   Allergen Reactions     Gold Salts Rash     Mild positive reaction to gold from derm patch testing      Iodine Rash     Mild positive reaction to derm patch testing     Linalool Rash     Mild positive reaction from derm patch testing     Methylisothiazolinone Rash     Mild positive reaction from derm patch testing and also to phenoxy ethanol     Nickel Rash     Derm patch test results= mild positive reaction     No Clinical Screening - See Comments Rash     Results of derm skin patch testing :  Mild Positive reactions = Benzisothiazolinone , disperse dye mix  And P-methylaminophenol shlphat (metol).                                                              Doubtful reactions ( possible irritations )= Balsam of [place] and Balsam [place]     Shellac Rash     Doubtful reaction ( possible irritation) from derm patch testing results     Sodium  Benzoate Rash     Doubtful reaction ( possible irritation) results from derm patch testing     Sodium Metabisulfite Rash     Mild positive reaction from derm patch testing         Review of Systems:  -Skin Establ Pt: The patient denies any new rash, pruritus, or lesions that are symptomatic, changing or bleeding, except as per HPI.  -Constitutional: Otherwise feeling well today, in usual state of health.  -Skin: As above in HPI. No additional skin concerns.    Physical exam:  Vitals: There were no vitals taken for this visit.  GEN: This is a well developed, well-nourished female in no acute distress, in a pleasant mood.    SKIN: Examination of the face, scalp, and neck was performed.  - Eyebrows within normal limits, Mild eyelid erythema. Eyelash extensions in place  - Notable hair thinning at the vertex scalp with focal alopecic patches  - No nail pitting   - Negative hair pull test  - LPPAI 0.67  - Perifollicular scale and erythema noted on the vertex scalp with thicker adherent scale    Impression/Plan:    1. Lichen Planopilaris, slowly improving  LPPAI score 0.67, slightly improved symptomatically with less erythema, scaling, and burning. The patient is currently only using clobetasol solution, and does not use any medicated shampoos or other topicals. She dislikes products that make her hair greasy.     - Patient to [place] [facility] [place] in extremities, to discuss scalp symptoms as well  - Continue clobetasol shampoo 2-3 times per week. Hx of irritation per patient, recommend self patch test at home with trial on forearm, to send photos if reaction  - Continue plaquenil 200 mg bid  - Kenalog intralesional injection procedure note: After verbal consent and discussion of risks including but not limited to atrophy, pain, and bruising, time out was performed, the patient underwent positioning and the area was prepped with isopropyl alcohol, 1.4 total cc of Kenalog 10 mg/cc was  injected into 20 sites on the scalp. The patient tolerated the procedure well and left the Dermatology clinic in good condition.  - Can consider topical gabapentin in the future, waiting to add additional treatments until evaluated by neurology  - Patient requesting additional hairpiece, to send message on MyChart with specifics       Follow-up in 1 month with phone visit to follow up on neurology visit and 3-4 months in person, earlier for new or changing lesions with Dr. Quinn      Staff Involved:  Bernadine Nguyen PGY3  Dermatology Resident  pager      Patient seen and examined with     Patient was seen and examined with the dermatology resident. I agree with the history, review of systems, physical examination, assessments and plan. ILK injections were done together.    Rossana Quinn MD  Professor and  Chair  Department of Dermatology  [facility]

## 2020-11-30 NOTE — NURSING NOTE
Dermatology Rooming Note    Chitra Monet's goals for this visit include:   Chief Complaint   Patient presents with     Hair Loss     Chitra is here today for a hair loss follow up    THAO Hunt

## 2020-11-30 NOTE — LETTER
11/30/2020       RE: Chitra Monet  [address]  [facility] [address]     Dear Colleague,    Thank you for referring your patient, Chitra Monet, to the [facility] DERMATOLOGY CLINIC [place] at [facility] Please see a copy of my visit note below.    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 ACD: patch testing 8/9/2017  - mild positive reaction to nickel, linalool, methylisothiezolinone, sodium metabisulfite, methyldibromoglutaronitrile/phenoxyethanol, benzisothiazolinone, disperse dye mix, gold, p-methylaminophenol sulphate (Metol), and iodine.  - It was determined that many of her products contained these ingredients and recommended that she use products off of the [place] safe list.    Encounter Date: Nov 30, 2020    CC:   Chief Complaint   Patient presents with     Hair Loss     Chitra is here today for a hair loss follow up        History of Present Illness:  Ms. Chitra Monet is a 65 year old female who presents as a follow-up for LPP. The patient was last seen 10/2020 when she was noted to have worsening symptoms.     Since her last visit, she stopped gabapentin due to balance issues, self tapered off. Did not think gabapentin was helpful for symptoms. Continues to use clobetasol shampoo once weekly. No major changes in symptoms and continues to have some pain and itching. No  prior use of topical gabapentin. No longer taking allegra, she is taking Benadryl. Taking plaquenil 200 mg bid which was restarted one month ago. Reports one episode of irritation with clobex shampoo.    She reports her hair and scalp is about the same.    The patient is otherwise in their usual state of health, with no other acute skin concerns.      Past Medical History:   Patient Active Problem List   Diagnosis     Lichen plano-pilaris     Dermatitis     Pruritus     Allergic dermatitis due to other chemical product     Past Medical History:   Diagnosis Date     Constipation     CHRONIC     Cough     CHRONIC     Migraine, unspecified, without mention of intractable migraine without mention of status migrainosus      Unspecified hypothyroidism      Past Surgical History:   Procedure Laterality Date     BIOPSY      BREAST     COLONOSCOPY  6/6/2013    Procedure: COLONOSCOPY;  COLONOSCOPY;  Surgeon: Caridad Young MD;  Location: [facility]     COSMETIC SURGERY      bleph 2 weeks ago     ENT SURGERY      TONSILLECTOMY     GYN SURGERY      LAPAROSCOPY       Social History:  Patient reports that she has never smoked. She has never used smokeless tobacco. She reports current alcohol use. She reports that she does not use drugs.    Family History:  Family History   Problem Relation Age of Onset     Melanoma No family hx of      Skin Cancer No family hx of        Medications:  Current Outpatient Medications   Medication Sig Dispense Refill     albuterol (ALBUTEROL) 108 (90 BASE) MCG/ACT inhaler Inhale 2 puffs into the lungs as needed. Patient uses 4 days per week.       BuPROPion HCl (WELLBUTRIN PO) Take 50 mg by mouth daily       clobetasol (TEMOVATE) 0.05 % external solution Apply topically as needed 50 mL 0     clobetasol propionate (CLOBEX) 0.05 % external shampoo Use twice a week 118 mL 4     fexofenadine (ALLEGRA ALLERGY) 180 MG tablet Take 1 tablet (180 mg) by mouth daily 60 tablet 3     Fluocinolone Acetonide  Scalp (DERMA-SMOOTHE/FS SCALP) 0.01 % OIL oil One to 2 mil to scalp once to twice a week for a minimum of 4 hours and up to overnight 60 mL 5     Fluocinolone Acetonide Scalp 0.01 % OIL oil Apply topically once a week Use one capful on affected areas of scalp at bedtime once weekly, may increase to twice weekly as needed 118 mL 1     gabapentin (NEURONTIN) 300 MG capsule Take 2 capsules (600 mg) by mouth At Bedtime 90 capsule 0     hydroxychloroquine (PLAQUENIL) 200 MG tablet Take 1 tablet (200 mg) by mouth daily Take 100 mg in AM and 200 mg in PM. 90 tablet 1     hydroxychloroquine (PLAQUENIL) 200 MG tablet Take 1 tablet (200 mg) by mouth 2 times daily 60 tablet 3     ketoconazole (NIZORAL) 2 % external shampoo Apply to scalp, lather, massage into skin, leave on for 2-3 minutes, then rinse, do up to 5 times per week 120 mL 10     Multiple Vitamin (MULTIVITAMINS PO) Take  by mouth daily.       NONFORMULARY One scalp prosthesis for scalp hair loss, lichen plano pilaris 1 each 5     NONFORMULARY One scalp prosthesis 1 each 1     NONFORMULARY One full scalp cranial prosthesis 1 each 5     NONFORMULARY One low level laser light 1 each 2     SUMAtriptan Succinate (IMITREX PO) Take 100 mg by mouth as needed.       tacrolimus (PROTOPIC) 0.1 % ointment Apply a thin layer to frontal scalp area before applying hair spray 60 g 1     Thyroid (NATURE-THROID) 81.25 MG TABS Take by mouth daily          Allergies   Allergen Reactions     Gold Salts Rash     Mild positive reaction to gold from derm patch testing      Iodine Rash     Mild positive reaction to derm patch testing     Linalool Rash     Mild positive reaction from derm patch testing     Methylisothiazolinone Rash     Mild positive reaction from derm patch testing and also to phenoxy ethanol     Nickel Rash     Derm patch test results= mild positive reaction     No Clinical Screening - See Comments Rash     Results of derm skin patch testing :  Mild Positive reactions =  Benzisothiazolinone , disperse dye mix  And P-methylaminophenol shlphat (metol).                                                              Doubtful reactions ( possible irritations )= Balsam of [place] and Balsam [place]     Shellac Rash     Doubtful reaction ( possible irritation) from derm patch testing results     Sodium Benzoate Rash     Doubtful reaction ( possible irritation) results from derm patch testing     Sodium Metabisulfite Rash     Mild positive reaction from derm patch testing         Review of Systems:  -Skin Establ Pt: The patient denies any new rash, pruritus, or lesions that are symptomatic, changing or bleeding, except as per HPI.  -Constitutional: Otherwise feeling well today, in usual state of health.  -Skin: As above in HPI. No additional skin concerns.    Physical exam:  Vitals: There were no vitals taken for this visit.  GEN: This is a well developed, well-nourished female in no acute distress, in a pleasant mood.    SKIN: Examination of the face, scalp, and neck was performed.  - Eyebrows within normal limits, Mild eyelid erythema. Eyelash extensions in place  - Notable hair thinning at the vertex scalp with focal alopecic patches  - No nail pitting   - Negative hair pull test  - LPPAI 0.67  - Perifollicular scale and erythema noted on the vertex scalp with thicker adherent scale    Impression/Plan:    1. Lichen Planopilaris, slowly improving  LPPAI score 0.67, slightly improved symptomatically with less erythema, scaling, and burning. The patient is currently only using clobetasol solution, and does not use any medicated shampoos or other topicals. She dislikes products that make her hair greasy.     - Patient [place] [facility] [place] in [place], to discuss scalp symptoms as well  - Continue clobetasol shampoo 2-3 times per week. Hx of irritation per patient, recommend self patch test at home with trial on forearm, to send photos if reaction  - Continue  plaquenil 200 mg bid  - Kenalog intralesional injection procedure note: After verbal consent and discussion of risks including but not limited to atrophy, pain, and bruising, time out was performed, the patient underwent positioning and the area was prepped with isopropyl alcohol, 1.4 total cc of Kenalog 10 mg/cc was injected into 20 sites on the scalp. The patient tolerated the procedure well and left the Dermatology clinic in good condition.  - Can consider topical gabapentin in the future, waiting to add additional treatments until evaluated by neurology  - Patient requesting additional hairpiece, to send message on [employer] with specifics       Follow-up in 1 month with phone visit to follow up on neurology visit and 3-4 months in person, earlier for new or changing lesions with Dr. Quinn      Staff Involved:  Bernadine Nguyen PGY3  Dermatology Resident  pager      Patient seen and examined with     Patient was seen and examined with the dermatology resident. I agree with the history, review of systems, physical examination, assessments and plan. ILK injections were done together.    Rossana Quinn MD  Professor and  Chair  Department of Dermatology  [facility]               Drug Administration Record    Prior to injection, verified patient identity using patient's name and date of birth.  Due to injection administration, patient instructed to remain in clinic for 15 minutes  afterwards, and to report any adverse reaction to me immediately.    Drug Name: triamcinolone acetonide(kenalog)  Dose: 1.4mL of triamcinolone 10mg/mL, 14mg dose  Route administered: ID  NDC #: Kenalog-10 (4376-9960-45)  Amount of waste(mL):3.6  Reason for waste: Single use vial    LOT #: [id number]  SITE: scalp  : [employer]  EXPIRATION DATE: 03/22    HairMetrix Summary (11/30/2020)    Frontal anterior    Midscalp    Vertex    Occipital    Right temporal    Left  temporal        Again, thank you for allowing me to participate in the care of your patient.      Sincerely,    Rossana Quinn MD

## 2020-11-30 NOTE — PROGRESS NOTES
Drug Administration Record    Prior to injection, verified patient identity using patient's name and date of birth.  Due to injection administration, patient instructed to remain in clinic for 15 minutes  afterwards, and to report any adverse reaction to me immediately.    Drug Name: triamcinolone acetonide(kenalog)  Dose: 1.4mL of triamcinolone 10mg/mL, 14mg dose  Route administered: ID  NDC #: Kenalog-10 (8172-9268-14)  Amount of waste(mL):3.6  Reason for waste: Single use vial    LOT #: [id number]  [facility]ITE[facility]  : [facility]  EXPIRATION DATE: 03/22

## 2020-12-01 NOTE — PROGRESS NOTES
HairMetrix Summary (11/30/2020)    Frontal anterior    Midscalp    Vertex    Occipital    Right temporal    Left temporal

## 2021-01-08 ENCOUNTER — VIRTUAL VISIT (OUTPATIENT)
Dept: DERMATOLOGY | Facility: CLINIC | Age: 66
End: 2021-01-08
Payer: COMMERCIAL

## 2021-01-08 DIAGNOSIS — L66.10 LICHEN PLANO-PILARIS: Primary | ICD-10-CM

## 2021-01-08 DIAGNOSIS — L23.5 ALLERGIC DERMATITIS DUE TO OTHER CHEMICAL PRODUCT: ICD-10-CM

## 2021-01-08 PROCEDURE — 99213 OFFICE O/P EST LOW 20 MIN: CPT | Mod: 95 | Performed by: DERMATOLOGY

## 2021-01-08 ASSESSMENT — PAIN SCALES - GENERAL: PAINLEVEL: EXTREME PAIN (8)

## 2021-01-08 NOTE — PATIENT INSTRUCTIONS
[facility] Dermatology Visit    Thank you for allowing us to participate in your care. Your findings, instructions and follow-up plan are as follows:         When should I call my doctor?    If you are worsening or not improving, please, contact us or seek urgent care as noted below.     Who should I call with questions (adults)?    [facility] (adult and pediatric): [phone]     [facility] (adult): [phone]    For urgent needs outside of business hours call the [facility] at [phone] and ask for the dermatology resident on call    If this is a medical emergency and you are unable to reach an ER, Call 911      Who should I call with questions (pediatric)?  [facility]- Pediatric Dermatology  Dr. Hoa Pederson, Dr. Tammy Stone, Dr. Maliha Pettit, Sarah Mcdaniels, PA  Dr. Mercy Valenzuela, Dr. Rossana Quinn & Dr. Tenzin Hanson  Non Urgent  Nurse Triage Line; [phone]- Stefany and Xochitl RN Care Coordinators   Luisana (/Complex ) [phone]    If you need a prescription refill, please contact your pharmacy. Refills are approved or denied by our Physicians during normal business hours, Monday through Fridays  Per office policy, refills will not be granted if you have not been seen within the past year (or sooner depending on your child's condition)    Scheduling Information:  Pediatric Appointment Scheduling and Call Center [phone]  Radiology Scheduling- [phone]  Sedation Unit Scheduling- [phone]  [place] Scheduling- General [phone]; Pediatric Dermatology [phone]  Main  Services: [phone]  Portuguese: [phone]  Cook Islander: [phone]  Hmong/Italian/Kazakh: [phone]  Preadmission Nursing Department Fax Number: [phone] (Fax all pre-operative paperwork to this number)    For urgent matters arising during evenings,  weekends, or holidays that cannot wait for normal business hours please call [phone] and ask for the Dermatology Resident On-Call to be paged.

## 2021-01-08 NOTE — NURSING NOTE
Dermatology Rooming Note    Chitra Monet's goals for this visit include:   Chief Complaint   Patient presents with     Hair Loss     Lichen plano-pilaris - She wants to know if there are any new treatments     Nancy Myles CMA

## 2021-01-08 NOTE — PROGRESS NOTES
[facility] Dermatology Note:  - Telephone Visit   Encounter Date: Jan 8, 2021  Telephone only  ([phone])    Dermatology Problem List:  1.  Lichen planopilaris   - Current tx: plaquenil 400 mg daily, clobetasol shampoo, ILK 10 at last visit, has been using clobetasol solution; preference is for clobex shampoo as has shown telangiectasias on hair metrix exam  - Considering pursuing PRP treatment, on hold currently  - s/p ILK 10 mg/cc approximately q12 weeks, injections   - Past tx: LLLT, Benadryl and Allegra for pruritus, fluocinolone oil, gabapentin, ketoconazole shampoo (patient disliked product), oral gabapentin  - Hairmetrics 11/30/20     2. ACD: patch testing 8/9/2017  - mild positive reaction to nickel, linalool, methylisothiezolinone, sodium metabisulfite, methyldibromoglutaronitrile/phenoxyethanol, benzisothiazolinone, disperse dye mix, gold, p-methylaminophenol sulphate (Metol), and iodine.  - It was determined that many of her products contained these ingredients and recommended that she use products off of the [place] safe list.       ____________________________________________    Assessment & Plan:  # Lichen planopilaris  with scalp dysesthesia  - Current tx: plaquenil 400 mg daily, clobetasol shampoo, ILK 10 at last visit, has been using clobetasol solution; preference is to transfer to clobex shampoo as is showing telangiectasias on hair metrix exam  - Considering pursuing PRP treatment, on hold currently. To follow up with neurology who is evaluating her extremity symptoms as well as scalp dysesthesia.  # LACD: patch testing 8/9/2017  - mild positive reaction to nickel, linalool, methylisothiezolinone, sodium metabisulfite, methyldibromoglutaronitrile/phenoxyethanol, benzisothiazolinone, disperse dye mix, gold, p-methylaminophenol sulphate (Metol), and iodine; continue to avoid known allergens    Follow-up: mid February, 2021  ____________________________________________    CC:  Hair Loss (Lichen plano-pilaris - She wants to know if there are any new treatments)      HPI:  Ms. Chitra Monet is a 65 year old female who presents for a virtual visit. She is following up at [facility] for dysesthesia in her extremities and will also review her scalp symptoms.  She continues on gabapentin 6 to 7 times per week; Plaquenil 400 mg has been a mainstay of therapy. Hair metrix at last visit revealed significant perifollicular scale and erythema as well as telangiectasia. Clobex shampoo 2 to 3 times per week was recommended. Instructed not to use if irritating and a self patch test at home with trial on forearm was recommended.     Patient is otherwise feeling well, without additional concerns.         Medications:  Current Outpatient Medications   Medication     albuterol (ALBUTEROL) 108 (90 BASE) MCG/ACT inhaler     BuPROPion HCl (WELLBUTRIN PO)     clobetasol (TEMOVATE) 0.05 % external solution     clobetasol propionate (CLOBEX) 0.05 % external shampoo     fexofenadine (ALLEGRA ALLERGY) 180 MG tablet     Fluocinolone Acetonide Scalp (DERMA-SMOOTHE/FS SCALP) 0.01 % OIL oil     Fluocinolone Acetonide Scalp 0.01 % OIL oil     ketoconazole (NIZORAL) 2 % external shampoo     Multiple Vitamin (MULTIVITAMINS PO)     NONFORMULARY     NONFORMULARY     NONFORMULARY     NONFORMULARY     SUMAtriptan Succinate (IMITREX PO)     tacrolimus (PROTOPIC) 0.1 % ointment     Thyroid (NATURE-THROID) 81.25 MG TABS     gabapentin (NEURONTIN) 300 MG capsule     hydroxychloroquine (PLAQUENIL) 200 MG tablet     hydroxychloroquine (PLAQUENIL) 200 MG tablet     Current Facility-Administered Medications   Medication     triamcinolone acetonide (KENALOG-10) injection 14 mg     triamcinolone acetonide (KENALOG-10) injection 30 mg      Past Medical/Surgical History:   Patient Active Problem List   Diagnosis     Lichen plano-pilaris     Dermatitis     Pruritus     Allergic dermatitis due to other chemical product     Past Medical  History:   Diagnosis Date     Constipation     CHRONIC     Cough     CHRONIC     Migraine, unspecified, without mention of intractable migraine without mention of status migrainosus      Unspecified hypothyroidism      Past Surgical History:   Procedure Laterality Date     BIOPSY       COLONOSCOPY  6/6/2013     COSMETIC SURGERY       ENT SURGERY       GYN SURGERY          Rossana Quinn MD  Professor and Chair  Department of Dermatology  [facility]

## 2021-01-08 NOTE — LETTER
1/8/2021       RE: Chitra Monet  [address]  [facility] [address]     Dear Colleague,    Thank you for referring your patient, Chitra Monet, to the [facility] DERMATOLOGY CLINIC [place] at [facility]. Please see a copy of my visit note below.    [facility] Dermatology Note:  - Telephone Visit   Encounter Date: Jan 8, 2021  Telephone only  ([phone])    Dermatology Problem List:  1.  Lichen planopilaris   - Current tx: plaquenil 400 mg daily, clobetasol shampoo, ILK 10 at last visit, has been using clobetasol solution; preference is for clobex shampoo as has shown telangiectasias on hair metrix exam  - Considering pursuing PRP treatment, on hold currently  - s/p ILK 10 mg/cc approximately q12 weeks, injections   - Past tx: LLLT, Benadryl and Allegra for pruritus, fluocinolone oil, gabapentin, ketoconazole shampoo (patient disliked product), oral gabapentin  - Hairmetrics 11/30/20     2. ACD: patch testing 8/9/2017  - mild positive reaction to nickel, linalool, methylisothiezolinone, sodium metabisulfite, methyldibromoglutaronitrile/phenoxyethanol, benzisothiazolinone, disperse dye mix, gold, p-methylaminophenol sulphate (Metol), and iodine.  - It was determined that many of her products contained these ingredients and recommended that she use products off of the [employer] safe list.       ____________________________________________    Assessment & Plan:  # Lichen planopilaris  with scalp dysesthesia  - Current tx: plaquenil 400 mg daily, clobetasol shampoo, ILK 10 at last visit, has been using clobetasol solution; preference is to transfer to clobex shampoo as is showing telangiectasias on hair metrix exam  - Considering pursuing PRP treatment, on hold currently. To follow up with neurology who is evaluating her extremity symptoms as well as scalp dysesthesia.  # LACD: patch testing 8/9/2017  - mild positive reaction to  nickel, linalool, methylisothiezolinone, sodium metabisulfite, methyldibromoglutaronitrile/phenoxyethanol, benzisothiazolinone, disperse dye mix, gold, p-methylaminophenol sulphate (Metol), and iodine; continue to avoid known allergens    Follow-up: mid February, 2021  ____________________________________________    CC: Hair Loss (Lichen plano-pilaris - She wants to know if there are any new treatments)      HPI:  Ms. Chitra Monet is a 65 year old female who presents for a virtual visit. She is [place] [facility] [place]esthesia in her extremities and will also review her scalp symptoms.  She continues on gabapentin 6 to 7 times per week; Plaquenil 400 mg has been a mainstay of therapy. Hair metrix at last visit revealed significant perifollicular scale and erythema as well as telangiectasia. Clobex shampoo 2 to 3 times per week was recommended. Instructed not to use if irritating and a self patch test at home with trial on forearm was recommended.     Patient is otherwise feeling well, without additional concerns.         Medications:  Current Outpatient Medications   Medication     albuterol (ALBUTEROL) 108 (90 BASE) MCG/ACT inhaler     BuPROPion HCl (WELLBUTRIN PO)     clobetasol (TEMOVATE) 0.05 % external solution     clobetasol propionate (CLOBEX) 0.05 % external shampoo     fexofenadine (ALLEGRA ALLERGY) 180 MG tablet     Fluocinolone Acetonide Scalp (DERMA-SMOOTHE/FS SCALP) 0.01 % OIL oil     Fluocinolone Acetonide Scalp 0.01 % OIL oil     ketoconazole (NIZORAL) 2 % external shampoo     Multiple Vitamin (MULTIVITAMINS PO)     NONFORMULARY     NONFORMULARY     NONFORMULARY     NONFORMULARY     SUMAtriptan Succinate (IMITREX PO)     tacrolimus (PROTOPIC) 0.1 % ointment     Thyroid (NATURE-THROID) 81.25 MG TABS     gabapentin (NEURONTIN) 300 MG capsule     hydroxychloroquine (PLAQUENIL) 200 MG tablet     hydroxychloroquine (PLAQUENIL) 200 MG tablet     Current Facility-Administered Medications   Medication      triamcinolone acetonide (KENALOG-10) injection 14 mg     triamcinolone acetonide (KENALOG-10) injection 30 mg      Past Medical/Surgical History:   Patient Active Problem List   Diagnosis     Lichen plano-pilaris     Dermatitis     Pruritus     Allergic dermatitis due to other chemical product     Past Medical History:   Diagnosis Date     Constipation     CHRONIC     Cough     CHRONIC     Migraine, unspecified, without mention of intractable migraine without mention of status migrainosus      Unspecified hypothyroidism      Past Surgical History:   Procedure Laterality Date     BIOPSY       COLONOSCOPY  6/6/2013     COSMETIC SURGERY       ENT SURGERY       GYN SURGERY          Rossana Quinn MD  Professor and Chair  Department of Dermatology  [facility]

## 2021-01-14 ENCOUNTER — HEALTH MAINTENANCE LETTER (OUTPATIENT)
Age: 66
End: 2021-01-14

## 2021-01-14 DIAGNOSIS — L66.10 LICHEN PLANO-PILARIS: ICD-10-CM

## 2021-01-18 RX ORDER — FLUOCINOLONE ACETONIDE 0.11 MG/ML
OIL TOPICAL
Qty: 118.28 ML | Refills: 5 | Status: SHIPPED | OUTPATIENT
Start: 2021-01-18

## 2021-01-18 NOTE — TELEPHONE ENCOUNTER
Last Clinic Visit: 1/8/2021 [facility] Dermatology [facility]    Refilled qty to 6 months from last visit (process #2/Derm protocol).

## 2021-02-16 ENCOUNTER — OFFICE VISIT (OUTPATIENT)
Dept: DERMATOLOGY | Facility: CLINIC | Age: 66
End: 2021-02-16
Payer: COMMERCIAL

## 2021-02-16 DIAGNOSIS — L66.10 LICHEN PLANO-PILARIS: Primary | ICD-10-CM

## 2021-02-16 DIAGNOSIS — I87.2 VENOUS STASIS DERMATITIS, UNSPECIFIED LATERALITY: ICD-10-CM

## 2021-02-16 PROCEDURE — 99214 OFFICE O/P EST MOD 30 MIN: CPT | Mod: 25 | Performed by: DERMATOLOGY

## 2021-02-16 PROCEDURE — 11901 INJECT SKIN LESIONS >7: CPT | Performed by: DERMATOLOGY

## 2021-02-16 RX ORDER — TRIAMCINOLONE ACETONIDE 1 MG/G
CREAM TOPICAL 2 TIMES DAILY
Qty: 45 G | Refills: 3 | Status: SHIPPED | OUTPATIENT
Start: 2021-02-16

## 2021-02-16 ASSESSMENT — PAIN SCALES - GENERAL: PAINLEVEL: NO PAIN (0)

## 2021-02-16 NOTE — LETTER
2/16/2021       RE: Chitra Monet  [address]  [facility] [address]     Dear Colleague,    Thank you for referring your patient, Chitra Monet, to the [facility] DERMATOLOGY CLINIC [place] at [facility]. Please see a copy of my visit note below.    [facility] Dermatology Note  Encounter Date: Feb 16, 2021  Office Visit     Dermatology Problem List:  1.  Lichen planopilaris   - Current tx: clobetasol shampoo twice a week and clobetasol solution PRN; preference for clobex shampoo as hair metrics shows telangiectasias  - Considering pursuing PRP treatment, on hold currently  - s/p ILK 2 ml of 10 mg/cc on 2/16/21  - Past tx: plaquenil, LLLT, Benadryl and Allegra for pruritus, fluocinolone oil, gabapentin, ketoconazole shampoo (patient disliked product), oral gabapentin  - Hairmetrics 11/30/20      2. ACD: patch testing 8/9/2017  - mild positive reaction to nickel, linalool, methylisothiazolinone, sodium metabisulfite, methyldibromoglutaronitrile/phenoxyethanol, benzisothiazolinone, disperse dye mix, gold, p-methylaminophenol sulphate (Metol), and iodine.  - It was determined that many of her products contained these ingredients and recommended that she use products off of the [place] safe list ([id number] and [id number])  - printed out new [place] list on 2/16/21     3. Stasis dermatitis  - triamcinolone 0.1% external cream BID PRN  ____________________________________________    Assessment & Plan:    # Lichen planopilaris with scalp dysesthesia; LPPAI 1.33. Clinically flaring. Not currently taking plaquenil 400mg or gabapentin due to headaches. Was considering pursuing PRP treatment at last visit, on hold currently. Currently seeing neurology who is evaluating her extremity symptoms as well as scalp dysesthesia  - Continue current tx: clobetasol shampoo twice a week and clobetasol solution PRN.  - Kenalog intralesional  injection procedure note: After verbal consent and discussion of risks including but not limited to atrophy, pain, and bruising, time out was performed, the patient underwent positioning and the area was prepped with isopropyl alcohol, 2 total cc of Kenalog 10 mg/cc was injected into 20 sites on the scalp. The patient tolerated the procedure well and left the Dermatology clinic in good condition.  - scalp prosthesis ordered at patient request    # ACD: patch testing 8/9/2017; using shampoo and conditioner suspicious for allergens. Counseled that patient should stop use of these products if not found on CAMP list which was provided.  - mild positive reaction to nickel, linalool, methylisothiazolinone, sodium metabisulfite, methyldibromoglutaronitrile/phenoxyethanol, benzisothiazolinone, disperse dye mix, gold, p-methylaminophenol sulphate (Metol), and iodine; continue to avoid known allergens  - printed out new CAMP list with her codes from records: [id number] and [id number].    # Stasis dermatitis  - triamcinolone 0.1% external cream BID PRN    Follow-up: 1 month(s) virtually (telephone with photos), or earlier for new or changing lesions then follow-up in 4 months.    Staff and Medical Student:     Nikko Parker  Medical Student    Staff Physician:  I was present with the medical student who participated in the service and in the documentation of the note. I have verified the history and personally performed the physical exam and medical decision making. I agree with the assessment and plan of care as documented in the note. ILK injections were primarily done by me.    Rossana Quinn MD  Professor and Chair  Department of Dermatology  [facility]: Phone: [phone], Fax:[phone]  [facility]: Phone: [phone], Fax: [phone]        ____________________________________________    CC: No chief  complaint on file.      HPI:  Ms. Chitra Monet is a 65 year old female who presents as a follow-up patient for hair loss, diagnosed as lichen planopilaris.     Last seen in virtually on 01/08/21 and in person on 11/30/2020.    She has not been taking the plaquenil or gabapentin as she believes these medications were leading to headaches. Her headaches have since resolved since stopping the medications. She will use clobetasol 0.05% solution three times a week and the clobetasol 0.05% shampoo once every two weeks.      She reports she has been experiencing increased hair loss for the past 2-3 weeks. Specifically, she feels the new hair loss is on the posterior scalp. Her scalp has been particularly itchy, and more red than usual. Today, she does not report itching. She is not experiencing any scalp burning or pain. No eyebrow, eyelash, or nail changes. She will take a daily multivitamin, vitamin C, and vitamin E. She is not experiencing any dysesthesia of the scalp. She isn't working right now which she does feel adds some stress to her life. She would like to have a prescription written for scalp prosthesis and was hoping to obtain a copy of the photos taken today.    Her last LPPAI score was 0.67 on 11/30/21.     No other concerns today and in general state of health.     Labs:  None reviewed.    Physical Exam:  Vitals: There were no vitals taken for this visit.  GEN: Well developed, well-nourished, in no acute distress, in a pleasant mood.    SKIN: Examination of the face, scalp, and neck was performed.  - Eyebrows within normal limits.  - Notable hair thinning at the vertex scalp with focal alopecic patches  - No nail pitting   - Negative hair pull test  - LPPAI 1.33  - Perifollicular scale and erythema noted on the vertex scalp with thicker adherent scale  - There is reticular purple hyperpigmentation on her b/l legs  - No other lesions of concern on areas examined.     Medications:  Current Outpatient  Medications   Medication     albuterol (ALBUTEROL) 108 (90 BASE) MCG/ACT inhaler     BuPROPion HCl (WELLBUTRIN PO)     clobetasol (TEMOVATE) 0.05 % external solution     clobetasol propionate (CLOBEX) 0.05 % external shampoo     fexofenadine (ALLEGRA ALLERGY) 180 MG tablet     Fluocinolone Acetonide Scalp 0.01 % OIL oil     Fluocinolone Acetonide Scalp 0.01 % OIL oil     gabapentin (NEURONTIN) 300 MG capsule     hydroxychloroquine (PLAQUENIL) 200 MG tablet     hydroxychloroquine (PLAQUENIL) 200 MG tablet     ketoconazole (NIZORAL) 2 % external shampoo     Multiple Vitamin (MULTIVITAMINS PO)     NONFORMULARY     NONFORMULARY     NONFORMULARY     NONFORMULARY     SUMAtriptan Succinate (IMITREX PO)     tacrolimus (PROTOPIC) 0.1 % ointment     Thyroid (NATURE-THROID) 81.25 MG TABS     Current Facility-Administered Medications   Medication     triamcinolone acetonide (KENALOG-10) injection 14 mg     triamcinolone acetonide (KENALOG-10) injection 30 mg      Past Medical/Surgical History:   Patient Active Problem List   Diagnosis     Lichen plano-pilaris     Dermatitis     Pruritus     Allergic dermatitis due to other chemical product     Past Medical History:   Diagnosis Date     Constipation     CHRONIC     Cough     CHRONIC     Migraine, unspecified, without mention of intractable migraine without mention of status migrainosus      Unspecified hypothyroidism      Past Surgical History:   Procedure Laterality Date     BIOPSY       COLONOSCOPY  6/6/2013     COSMETIC SURGERY       ENT SURGERY       GYN SURGERY       Family History:  Her mother had type 1 diabetes.    Social History:  No current alcohol, tobacco, or drug use.    CC No referring provider defined for this encounter. on close of this encounter.      Drug Administration Record    Prior to injection, verified patient identity using patient's name and date of birth.  Due to injection administration, patient instructed to remain in clinic for 15 minutes   afterwards, and to report any adverse reaction to me immediately.    Drug Name: triamcinolone acetonide(kenalog)  Dose: 2mL of triamcinolone 10mg/mL, 20mg dose  Route administered: ID  NDC #: Kenalog-10 (6450-7204-20)  Amount of waste(mL):3  Reason for waste: Multi dose vial    LOT #: [id number]  SITE: Scalp  : [employer]  EXPIRATION DATE: APR 2022        Again, thank you for allowing me to participate in the care of your patient.      Sincerely,    Rossana Quinn MD

## 2021-02-16 NOTE — PROGRESS NOTES
Drug Administration Record    Prior to injection, verified patient identity using patient's name and date of birth.  Due to injection administration, patient instructed to remain in clinic for 15 minutes  afterwards, and to report any adverse reaction to me immediately.    Drug Name: triamcinolone acetonide(kenalog)  Dose: 2mL of triamcinolone 10mg/mL, 20mg dose  Route administered: ID  NDC #: Kenalog-10 (7273-3778-49)  Amount of waste(mL):3  Reason for waste: Multi dose vial    LOT #: [id number]  SITE: Scalp  : [employer]  EXPIRATION DATE: APR 2022

## 2021-02-16 NOTE — NURSING NOTE
Dermatology Rooming Note    Chitra Monet's goals for this visit include:   Chief Complaint   Patient presents with     Hair Loss     Chitra is here today for a 4 month follow up regarding hair loss. She reports that she has noticed worsening of hair loss over the past 4 months. She further states that she noticing the hair loss primarily on the posterior scalp.      Wilfredo Sotomayor, EMT

## 2021-02-16 NOTE — PROGRESS NOTES
[facility] Dermatology Note  Encounter Date: Feb 16, 2021  Office Visit     Dermatology Problem List:  1.  Lichen planopilaris   - Current tx: clobetasol shampoo twice a week and clobetasol solution PRN; preference for clobex shampoo as hair metrics shows telangiectasias  - Considering pursuing PRP treatment, on hold currently  - s/p ILK 2 ml of 10 mg/cc on 2/16/21  - Past tx: plaquenil, LLLT, Benadryl and Allegra for pruritus, fluocinolone oil, gabapentin, ketoconazole shampoo (patient disliked product), oral gabapentin  - Hairmetrics 11/30/20      2. ACD: patch testing 8/9/2017  - mild positive reaction to nickel, linalool, methylisothiazolinone, sodium metabisulfite, methyldibromoglutaronitrile/phenoxyethanol, benzisothiazolinone, disperse dye mix, gold, p-methylaminophenol sulphate (Metol), and iodine.  - It was determined that many of her products contained these ingredients and recommended that she use products off of the [place] safe list ([id number] and [id number])  - printed out new [place] list on 2/16/21     3. Stasis dermatitis  - triamcinolone 0.1% external cream BID PRN  ____________________________________________    Assessment & Plan:    # Lichen planopilaris with scalp dysesthesia; LPPAI 1.33. Clinically flaring. Not currently taking plaquenil 400mg or gabapentin due to headaches. Was considering pursuing PRP treatment at last visit, on hold currently. Currently seeing neurology who is evaluating her extremity symptoms as well as scalp dysesthesia  - Continue current tx: clobetasol shampoo twice a week and clobetasol solution PRN.  - Kenalog intralesional injection procedure note: After verbal consent and discussion of risks including but not limited to atrophy, pain, and bruising, time out was performed, the patient underwent positioning and the area was prepped with isopropyl alcohol, 2 total cc of Kenalog 10 mg/cc was injected into 20 sites on the scalp. The patient tolerated the  procedure well and left the Dermatology clinic in good condition.  - scalp prosthesis ordered at patient request    # ACD: patch testing 8/9/2017; using shampoo and conditioner suspicious for allergens. Counseled that patient should stop use of these products if not found on CAMP list which was provided.  - mild positive reaction to nickel, linalool, methylisothiazolinone, sodium metabisulfite, methyldibromoglutaronitrile/phenoxyethanol, benzisothiazolinone, disperse dye mix, gold, p-methylaminophenol sulphate (Metol), and iodine; continue to avoid known allergens  - printed out new [place] list with her codes from records: [id number] and [id number].    # Stasis dermatitis  - triamcinolone 0.1% external cream BID PRN    Follow-up: 1 month(s) virtually (telephone with photos), or earlier for new or changing lesions then follow-up in 4 months.    Staff and Medical Student:     Nikko Parker  Medical Student    Staff Physician:  I was present with the medical student who participated in the service and in the documentation of the note. I have verified the history and personally performed the physical exam and medical decision making. I agree with the assessment and plan of care as documented in the note. ILK injections were primarily done by me.    Rossana Quinn MD  Professor and Chair  Department of Dermatology  [facility]: Phone: [phone], Fax:[phone]  [facility]: Phone: [phone], Fax: [phone]        ____________________________________________    CC: No chief complaint on file.      HPI:  Ms. Chitra Monet is a 65 year old female who presents as a follow-up patient for hair loss, diagnosed as lichen planopilaris.     Last seen in virtually on 01/08/21 and in person on 11/30/2020.    She has not been taking the plaquenil or gabapentin as she believes these medications were leading to headaches.  Her headaches have since resolved since stopping the medications. She will use clobetasol 0.05% solution three times a week and the clobetasol 0.05% shampoo once every two weeks.      She reports she has been experiencing increased hair loss for the past 2-3 weeks. Specifically, she feels the new hair loss is on the posterior scalp. Her scalp has been particularly itchy, and more red than usual. Today, she does not report itching. She is not experiencing any scalp burning or pain. No eyebrow, eyelash, or nail changes. She will take a daily multivitamin, vitamin C, and vitamin E. She is not experiencing any dysesthesia of the scalp. She isn't working right now which she does feel adds some stress to her life. She would like to have a prescription written for scalp prosthesis and was hoping to obtain a copy of the photos taken today.    Her last LPPAI score was 0.67 on 11/30/21.     No other concerns today and in general state of health.     Labs:  None reviewed.    Physical Exam:  Vitals: There were no vitals taken for this visit.  GEN: Well developed, well-nourished, in no acute distress, in a pleasant mood.    SKIN: Examination of the face, scalp, and neck was performed.  - Eyebrows within normal limits.  - Notable hair thinning at the vertex scalp with focal alopecic patches  - No nail pitting   - Negative hair pull test  - LPPAI 1.33  - Perifollicular scale and erythema noted on the vertex scalp with thicker adherent scale  - There is reticular purple hyperpigmentation on her b/l legs  - No other lesions of concern on areas examined.     Medications:  Current Outpatient Medications   Medication     albuterol (ALBUTEROL) 108 (90 BASE) MCG/ACT inhaler     BuPROPion HCl (WELLBUTRIN PO)     clobetasol (TEMOVATE) 0.05 % external solution     clobetasol propionate (CLOBEX) 0.05 % external shampoo     fexofenadine (ALLEGRA ALLERGY) 180 MG tablet     Fluocinolone Acetonide Scalp 0.01 % OIL oil     Fluocinolone Acetonide  Scalp 0.01 % OIL oil     gabapentin (NEURONTIN) 300 MG capsule     hydroxychloroquine (PLAQUENIL) 200 MG tablet     hydroxychloroquine (PLAQUENIL) 200 MG tablet     ketoconazole (NIZORAL) 2 % external shampoo     Multiple Vitamin (MULTIVITAMINS PO)     NONFORMULARY     NONFORMULARY     NONFORMULARY     NONFORMULARY     SUMAtriptan Succinate (IMITREX PO)     tacrolimus (PROTOPIC) 0.1 % ointment     Thyroid (NATURE-THROID) 81.25 MG TABS     Current Facility-Administered Medications   Medication     triamcinolone acetonide (KENALOG-10) injection 14 mg     triamcinolone acetonide (KENALOG-10) injection 30 mg      Past Medical/Surgical History:   Patient Active Problem List   Diagnosis     Lichen plano-pilaris     Dermatitis     Pruritus     Allergic dermatitis due to other chemical product     Past Medical History:   Diagnosis Date     Constipation     CHRONIC     Cough     CHRONIC     Migraine, unspecified, without mention of intractable migraine without mention of status migrainosus      Unspecified hypothyroidism      Past Surgical History:   Procedure Laterality Date     BIOPSY       COLONOSCOPY  6/6/2013     COSMETIC SURGERY       ENT SURGERY       GYN SURGERY       Family History:  Her mother had type 1 diabetes.    Social History:  No current alcohol, tobacco, or drug use.    CC No referring provider defined for this encounter. on close of this encounter.

## 2021-03-13 DIAGNOSIS — L30.9 DERMATITIS: ICD-10-CM

## 2021-03-13 DIAGNOSIS — L65.9 LOSS OF HAIR: ICD-10-CM

## 2021-03-13 DIAGNOSIS — L66.10 LICHEN PLANO-PILARIS: Primary | ICD-10-CM

## 2021-05-21 ENCOUNTER — HOSPITAL ENCOUNTER (OUTPATIENT)
Dept: MAMMOGRAPHY | Facility: CLINIC | Age: 66
Discharge: HOME | End: 2021-05-21
Attending: FAMILY MEDICINE | Admitting: FAMILY MEDICINE
Payer: COMMERCIAL

## 2021-05-21 DIAGNOSIS — Z12.31 VISIT FOR SCREENING MAMMOGRAM: ICD-10-CM

## 2021-05-21 PROCEDURE — 77063 BREAST TOMOSYNTHESIS BI: CPT

## 2021-06-21 ENCOUNTER — OFFICE VISIT (OUTPATIENT)
Dept: DERMATOLOGY | Facility: CLINIC | Age: 66
End: 2021-06-21
Payer: COMMERCIAL

## 2021-06-21 DIAGNOSIS — L66.10 LICHEN PLANO-PILARIS: ICD-10-CM

## 2021-06-21 DIAGNOSIS — L30.9 DERMATITIS: Primary | ICD-10-CM

## 2021-06-21 PROCEDURE — 99214 OFFICE O/P EST MOD 30 MIN: CPT | Performed by: DERMATOLOGY

## 2021-06-21 RX ORDER — LEVOTHYROXINE SODIUM 125 UG/1
TABLET ORAL
COMMUNITY
Start: 2021-06-04

## 2021-06-21 ASSESSMENT — PAIN SCALES - GENERAL: PAINLEVEL: NO PAIN (0)

## 2021-06-21 NOTE — LETTER
6/21/2021       RE: Chitra Monet  [address]  [facility] [address]     Dear Colleague,    Thank you for referring your patient, Chitra Monet, to the [facility] DERMATOLOGY CLINIC [facility]. Please see a copy of my visit note below.    No notes on file    Again, thank you for allowing me to participate in the care of your patient.      Sincerely,    Rossana Quinn MD

## 2021-06-21 NOTE — PROGRESS NOTES
"Dermatology Rooming Note    Chitra Monet's goals for this visit include:   Chief Complaint   Patient presents with     Hair Loss     Here to follow up on hairloss.  Patient states\" it is not growing in at all\".     Haylie Solares RN    "

## 2021-06-21 NOTE — LETTER
"6/21/2021       RE: Chitra Monet  [address]  [facility] [address]     Dear Colleague,    Thank you for referring your patient, Chitra Monet, to the [facility] [place] at [facility]. Please see a copy of my visit note below.    Dermatology Rooming Note    Chitra Monet's goals for this visit include:   Chief Complaint   Patient presents with     Hair Loss     Here to follow up on hairloss.  Patient states\" it is not growing in at all\".     Haylie Solares, JAYLIN      [facility] Dermatology Note    Dermatology Problem List:  1.  Lichen planopilaris   - Current tx: Unknown prescription shampoo twice a week and clobetasol solution PRN (preference for clobex shampoo as hair metrics shows telangiectasias)  - Considering pursuing PRP treatment, on hold currently  - s/p ILK 2 ml of 10 mg/cc on 2/16/21  - Past tx: plaquenil, LLLT, Benadryl and Allegra for pruritus, fluocinolone oil, gabapentin, ketoconazole shampoo (patient disliked product), oral gabapentin  - HairMetrix: 11/30/20, 6/21/21      2. ACD: patch testing 8/9/2017, referral for a reevaluation today as she persists with scalp inflammation  - mild positive reaction to nickel, linalool, methylisothiazolinone, sodium metabisulfite, methyldibromoglutaronitrile/phenoxyethanol, benzisothiazolinone, disperse dye mix, gold, p-methylaminophenol sulphate (Metol), and iodine.  - It was determined that many of her products contained these ingredients and recommended that she use products off of the [place] safe list ([id number] and [id number])  - printed out new [place] list on 2/16/21      3. Bone spurs, left ankle    4. Stasis dermatitis, resolved  - Past tx: triamcinolone 0.1% external cream BID PRN    Encounter Date: Jun 21, 2021    CC:   Chief Complaint   Patient presents with     Hair Loss     Here to follow up on hairloss.  Patient states\" it is not " "growing in at all\".       History of Present Illness:  Ms. Chitra Monet is a 66 year old female who presents as a follow-up patient for hair loss. Ms. Monet was last seen in clinic on 2/16/21 when she had not been taking plaquenil or gabapentin as she believed these medications caused her to experience headaches. She reported that her headaches have since resolved since stopping the medications. She reported experiencing increased hair loss for the previous 2-3 weeks. She felt new hair loss on her posterior scalp. Her scalp was particularly itchy and more red than usual. At this time she did not report itching. She was not experiencing any scalp burning or pain. No eyebrow, eyelash, or nail changes. She was not experiencing any dysesthesia of the scalp. She was not working and felt that when working this  added some stress to her life. She wanted to have a prescription written for scalp prosthesis and hoped to obtain a copy of the photos taken at the past visit.    Today, Ms. Monet reports being healthy since her previous visit and denies taking any new medications. She denies any scalp itching, burning, tingling or pain sensations. She mentioned noticing a new bump near her scalp vertex. She is unsure of the name of the prescription shampoo she has been using, but reports using it to wash her hair at least twice weekly. She also uses Free & Clear conditioner. She has used a clobetasol solution a couple on times on her itchy scalp areas.     No other concerns today and in general state of health.       Past Medical History:   Patient Active Problem List   Diagnosis     Lichen plano-pilaris     Dermatitis     Pruritus     Allergic dermatitis due to other chemical product     Past Medical History:   Diagnosis Date     Constipation     CHRONIC     Cough     CHRONIC     Migraine, unspecified, without mention of intractable migraine without mention of status migrainosus      Unspecified hypothyroidism      Past " Surgical History:   Procedure Laterality Date     BIOPSY      BREAST     COLONOSCOPY  6/6/2013    Procedure: COLONOSCOPY;  COLONOSCOPY;  Surgeon: Caridad Young MD;  Location: [facility]     COSMETIC SURGERY      bleph 2 weeks ago     ENT SURGERY      TONSILLECTOMY     GYN SURGERY      LAPAROSCOPY       Social History:   reports that she has never smoked. She has never used smokeless tobacco. She reports current alcohol use. She reports that she does not use drugs.    Family History:  Family History   Problem Relation Age of Onset     Melanoma No family hx of      Skin Cancer No family hx of        Medications:  Current Outpatient Medications   Medication Sig Dispense Refill     albuterol (ALBUTEROL) 108 (90 BASE) MCG/ACT inhaler Inhale 2 puffs into the lungs as needed. Patient uses 4 days per week.       BuPROPion HCl (WELLBUTRIN PO) Take 50 mg by mouth daily       clobetasol (TEMOVATE) 0.05 % external solution Apply topically as needed 50 mL 0     clobetasol propionate (CLOBEX) 0.05 % external shampoo Use twice a week 118 mL 4     Fluocinolone Acetonide Scalp 0.01 % OIL oil Apply topically once a week Use one capful on affected areas of scalp at bedtime once weekly, may increase to twice weekly as needed 118 mL 1     gabapentin 6 % SOLN solution Apply 2 ml to scalp daily 60 mL 3     ketoconazole (NIZORAL) 2 % external shampoo Apply to scalp, lather, massage into skin, leave on for 2-3 minutes, then rinse, do up to 5 times per week 120 mL 10     levothyroxine (SYNTHROID/LEVOTHROID) 125 MCG tablet        Multiple Vitamin (MULTIVITAMINS PO) Take  by mouth daily.       SUMAtriptan Succinate (IMITREX PO) Take 100 mg by mouth as needed.       tacrolimus (PROTOPIC) 0.1 % ointment Apply a thin layer to frontal scalp area before applying hair spray 60 g 1     fexofenadine (ALLEGRA ALLERGY) 180 MG tablet Take 1 tablet (180 mg) by mouth daily (Patient not taking: Reported on 6/21/2021) 60 tablet 3     Fluocinolone Acetonide  Scalp 0.01 % OIL oil 1 to 2 ml to scalp once to twice a week for a minimum of 4 hours and up to overnight (Patient not taking: Reported on 2/16/2021) 118.28 mL 5     gabapentin (NEURONTIN) 300 MG capsule Take 2 capsules (600 mg) by mouth At Bedtime (Patient not taking: Reported on 6/21/2021) 90 capsule 0     hydroxychloroquine (PLAQUENIL) 200 MG tablet Take 1 tablet (200 mg) by mouth daily Take 100 mg in AM and 200 mg in PM. (Patient not taking: Reported on 6/21/2021) 90 tablet 1     hydroxychloroquine (PLAQUENIL) 200 MG tablet Take 1 tablet (200 mg) by mouth 2 times daily (Patient not taking: Reported on 6/21/2021) 60 tablet 3     NONFORMULARY One scalp prosthesis for scalp hair loss, lichen plano pilaris (Patient not taking: Reported on 6/21/2021) 1 each 5     NONFORMULARY One scalp prosthesis (Patient not taking: Reported on 6/21/2021) 1 each 1     NONFORMULARY One full scalp cranial prosthesis (Patient not taking: Reported on 6/21/2021) 1 each 5     NONFORMULARY One low level laser light (Patient not taking: Reported on 6/21/2021) 1 each 2     Thyroid (NATURE-THROID) 81.25 MG TABS Take by mouth daily       triamcinolone (KENALOG) 0.1 % external cream Apply topically 2 times daily Apply a thin layer to affected areas of itch 1-2x per day on lower legs (Patient not taking: Reported on 6/21/2021) 45 g 3      Allergies   Allergen Reactions     Gold Salts Rash     Mild positive reaction to gold from derm patch testing      Iodine Rash     Mild positive reaction to derm patch testing     Linalool Rash     Mild positive reaction from derm patch testing     Methylisothiazolinone Rash     Mild positive reaction from derm patch testing and also to phenoxy ethanol     Nickel Rash     Derm patch test results= mild positive reaction     No Clinical Screening - See Comments Rash     Results of derm skin patch testing :  Mild Positive reactions = Benzisothiazolinone , disperse dye mix  And P-methylaminophenol shlphat  (metol).                                                              Doubtful reactions ( possible irritations )= Balsam of [place] and Balsam [place]     Shellac Rash     Doubtful reaction ( possible irritation) from derm patch testing results     Sodium Benzoate Rash     Doubtful reaction ( possible irritation) results from derm patch testing     Sodium Metabisulfite Rash     Mild positive reaction from derm patch testing       Review of Systems:  -Constitutional: Otherwise doing well.   -Skin: As above in HPI. No additional skin concerns.    Physical exam:  Vitals: There were no vitals taken for this visit.  GEN: Well developed, well-nourished, in no acute distress, in a pleasant mood.    SKIN: Focused examination of the scalp and face was performed.  - Atrophic skin, localized at vertex  - Diffuse erythema  - Telangiectasia  - In comparison to prior photographs (2/16/21):   - Left temporal: Improved   - Right temporal: Improved   - Vertex: Improved   - Occipital: Improved  Hair metrix significant for perifollicular and diffuse scale    Impression/Plan:  1.  Lichen planopilaris   - Continue: clobetasol shampoo twice a week   - Continue: clobetasol solution PRN  - Start: Photobiomodulation (PBM) with HairMax laser comb three times per week  - Send a photo of the current prescription shampoo you have been using over Epic  - Consider: pursuing PRP treatment in the future, on hold currently  - Because of the ongoing scalp inflammation, rec. Allergy evaluation - referral placed to Dr. Badillo     2. Bone spurs, left ankle  - Follow up with orthopedics     - Labs: N/A    Follow-up in 2 months, earlier for new or changing lesions.     Dr. Quinn staffed the patient.    Staff Involved:  Scribe/Fellow (Lanie Carmona)/Staff(as above)    Scribe Disclosure  I, Lanie Carmona, am serving as a scribe to document services personally performed by Dr. Rossana Quinn MD, based on data collection and the provider's statements to me.       Provider Disclosure:   The documentation recorded by the scribe accurately reflects the services I personally performed and the decisions made by me.    Rossana Quinn MD  Professor and Chair  Department of Dermatology  [facility]: Phone: [phone], Fax:[phone]  [facility]: Phone: [phone], Fax: [phone]        HairMetrix Summary (6/21/2021)    Frontal anterior    Midscalp    Vertex    Occipital    Right temporal    Left temporal        Again, thank you for allowing me to participate in the care of your patient.      Sincerely,    Rossana Quinn MD

## 2021-06-21 NOTE — PATIENT INSTRUCTIONS
Photobiomodulation (Low Level Laser (Light) Therapy)      What is Photobiomodulation?     Photobiomodulation, also referred to as low level laser therapy,  is an emerging treatment for hair thinning in men and women, also known as pattern hair loss. The devices use light to stimulate the hair follicle.  The exact mechanism is still unknown but there is evidence for improvement with hair growth and density.      What types of devices are available?    Each patient has many different options devices depending on preference for shape, frequency of use and price point.      There is no study comparing these devices. However, most research available is on the Hairmax devices.    Below is a sample of some devices currently available. All are FDA cleared for androgenetic alopecia.    In general, the more laser lights the more expensive the device. However, this does not necessarily mean the device works better.      Vquence Lasercomb and Band   Shape Comb or Band   Garcia Comb ($199-$399), Band ($499-$799)   Contact Information www.[employer]  [phone]  [phone]              LaserWebTeb Pro Capillus 82 iGrow Theradome   Shape Hat Baseball Cap Helmet Helmet   Garcia $3,000 $799 $549 $895   Contact Information [employer]  [phone]  [email or web address]  [phone]  [email or web address]  [phone]  [email or web address]   [phone]         If you plan to buy a hair max device, please consider using the following coupon code as this will give you a discount and also result in a donation from [employer] to our medical students.     e      Plan to use the laser comb.     Go to web site -   [employer] (CARF)      Allergic contact dermatitis -: patch testing 8/9/2017  - mild positive reaction to nickel, linalool, methylisothiazolinone, sodium metabisulfite, methyldibromoglutaronitrile/phenoxyethanol, benzisothiazolinone, disperse dye mix,  gold, p-methylaminophenol sulphate (Metol), and iodine.  - It was determined that many of her products contained these ingredients and recommended that she use products off of the [place] safe list ([id number] and [id number]    Check with the following practices for scalp cleansing with hydrofacial devices -   Dr. Timbo Monroy or Dr. Fransisca Arreola

## 2021-06-22 NOTE — PROGRESS NOTES
HairMetrix Summary (6/21/2021)    Frontal anterior    Midscalp    Vertex    Occipital    Right temporal    Left temporal

## 2021-06-22 NOTE — PROGRESS NOTES
"[facility] Dermatology Note    Dermatology Problem List:  1.  Lichen planopilaris   - Current tx: Unknown prescription shampoo twice a week and clobetasol solution PRN (preference for clobex shampoo as hair metrics shows telangiectasias)  - Considering pursuing PRP treatment, on hold currently  - s/p ILK 2 ml of 10 mg/cc on 2/16/21  - Past tx: plaquenil, LLLT, Benadryl and Allegra for pruritus, fluocinolone oil, gabapentin, ketoconazole shampoo (patient disliked product), oral gabapentin  - HairMetrix: 11/30/20, 6/21/21      2. ACD: patch testing 8/9/2017, referral for a reevaluation today as she persists with scalp inflammation  - mild positive reaction to nickel, linalool, methylisothiazolinone, sodium metabisulfite, methyldibromoglutaronitrile/phenoxyethanol, benzisothiazolinone, disperse dye mix, gold, p-methylaminophenol sulphate (Metol), and iodine.  - It was determined that many of her products contained these ingredients and recommended that she use products off of the [place] safe list ([id number] and [id number])  - printed out new [place] list on 2/16/21      3. Bone spurs, left ankle    4. Stasis dermatitis, resolved  - Past tx: triamcinolone 0.1% external cream BID PRN    Encounter Date: Jun 21, 2021    CC:   Chief Complaint   Patient presents with     Hair Loss     Here to follow up on hairloss.  Patient states\" it is not growing in at all\".       History of Present Illness:  Ms. Chitra Monet is a 66 year old female who presents as a follow-up patient for hair loss. Ms. Monet was last seen in clinic on 2/16/21 when she had not been taking plaquenil or gabapentin as she believed these medications caused her to experience headaches. She reported that her headaches have since resolved since stopping the medications. She reported experiencing increased hair loss for the previous 2-3 weeks. She felt new hair loss on her posterior scalp. Her scalp was particularly itchy and more red than " usual. At this time she did not report itching. She was not experiencing any scalp burning or pain. No eyebrow, eyelash, or nail changes. She was not experiencing any dysesthesia of the scalp. She was not working and felt that when working this  added some stress to her life. She wanted to have a prescription written for scalp prosthesis and hoped to obtain a copy of the photos taken at the past visit.    Today, Ms. Monet reports being healthy since her previous visit and denies taking any new medications. She denies any scalp itching, burning, tingling or pain sensations. She mentioned noticing a new bump near her scalp vertex. She is unsure of the name of the prescription shampoo she has been using, but reports using it to wash her hair at least twice weekly. She also uses Free & Clear conditioner. She has used a clobetasol solution a couple on times on her itchy scalp areas.     No other concerns today and in general state of health.       Past Medical History:   Patient Active Problem List   Diagnosis     Lichen plano-pilaris     Dermatitis     Pruritus     Allergic dermatitis due to other chemical product     Past Medical History:   Diagnosis Date     Constipation     CHRONIC     Cough     CHRONIC     Migraine, unspecified, without mention of intractable migraine without mention of status migrainosus      Unspecified hypothyroidism      Past Surgical History:   Procedure Laterality Date     BIOPSY      BREAST     COLONOSCOPY  6/6/2013    Procedure: COLONOSCOPY;  COLONOSCOPY;  Surgeon: Caridad Young MD;  [facility]     COSMETIC SURGERY      bleph 2 weeks ago     ENT SURGERY      TONSILLECTOMY     GYN SURGERY      LAPAROSCOPY       Social History:   reports that she has never smoked. She has never used smokeless tobacco. She reports current alcohol use. She reports that she does not use drugs.    Family History:  Family History   Problem Relation Age of Onset     Melanoma No family hx of      Skin  Cancer No family hx of        Medications:  Current Outpatient Medications   Medication Sig Dispense Refill     albuterol (ALBUTEROL) 108 (90 BASE) MCG/ACT inhaler Inhale 2 puffs into the lungs as needed. Patient uses 4 days per week.       BuPROPion HCl (WELLBUTRIN PO) Take 50 mg by mouth daily       clobetasol (TEMOVATE) 0.05 % external solution Apply topically as needed 50 mL 0     clobetasol propionate (CLOBEX) 0.05 % external shampoo Use twice a week 118 mL 4     Fluocinolone Acetonide Scalp 0.01 % OIL oil Apply topically once a week Use one capful on affected areas of scalp at bedtime once weekly, may increase to twice weekly as needed 118 mL 1     gabapentin 6 % SOLN solution Apply 2 ml to scalp daily 60 mL 3     ketoconazole (NIZORAL) 2 % external shampoo Apply to scalp, lather, massage into skin, leave on for 2-3 minutes, then rinse, do up to 5 times per week 120 mL 10     levothyroxine (SYNTHROID/LEVOTHROID) 125 MCG tablet        Multiple Vitamin (MULTIVITAMINS PO) Take  by mouth daily.       SUMAtriptan Succinate (IMITREX PO) Take 100 mg by mouth as needed.       tacrolimus (PROTOPIC) 0.1 % ointment Apply a thin layer to frontal scalp area before applying hair spray 60 g 1     fexofenadine (ALLEGRA ALLERGY) 180 MG tablet Take 1 tablet (180 mg) by mouth daily (Patient not taking: Reported on 6/21/2021) 60 tablet 3     Fluocinolone Acetonide Scalp 0.01 % OIL oil 1 to 2 ml to scalp once to twice a week for a minimum of 4 hours and up to overnight (Patient not taking: Reported on 2/16/2021) 118.28 mL 5     gabapentin (NEURONTIN) 300 MG capsule Take 2 capsules (600 mg) by mouth At Bedtime (Patient not taking: Reported on 6/21/2021) 90 capsule 0     hydroxychloroquine (PLAQUENIL) 200 MG tablet Take 1 tablet (200 mg) by mouth daily Take 100 mg in AM and 200 mg in PM. (Patient not taking: Reported on 6/21/2021) 90 tablet 1     hydroxychloroquine (PLAQUENIL) 200 MG tablet Take 1 tablet (200 mg) by mouth 2 times  daily (Patient not taking: Reported on 6/21/2021) 60 tablet 3     NONFORMULARY One scalp prosthesis for scalp hair loss, lichen plano pilaris (Patient not taking: Reported on 6/21/2021) 1 each 5     NONFORMULARY One scalp prosthesis (Patient not taking: Reported on 6/21/2021) 1 each 1     NONFORMULARY One full scalp cranial prosthesis (Patient not taking: Reported on 6/21/2021) 1 each 5     NONFORMULARY One low level laser light (Patient not taking: Reported on 6/21/2021) 1 each 2     Thyroid (NATURE-THROID) 81.25 MG TABS Take by mouth daily       triamcinolone (KENALOG) 0.1 % external cream Apply topically 2 times daily Apply a thin layer to affected areas of itch 1-2x per day on lower legs (Patient not taking: Reported on 6/21/2021) 45 g 3      Allergies   Allergen Reactions     Gold Salts Rash     Mild positive reaction to gold from derm patch testing      Iodine Rash     Mild positive reaction to derm patch testing     Linalool Rash     Mild positive reaction from derm patch testing     Methylisothiazolinone Rash     Mild positive reaction from derm patch testing and also to phenoxy ethanol     Nickel Rash     Derm patch test results= mild positive reaction     No Clinical Screening - See Comments Rash     Results of derm skin patch testing :  Mild Positive reactions = Benzisothiazolinone , disperse dye mix  And P-methylaminophenol shlphat (metol).                                                              Doubtful reactions ( possible irritations )= Balsam of [place] and Balsam [place]     Shellac Rash     Doubtful reaction ( possible irritation) from derm patch testing results     Sodium Benzoate Rash     Doubtful reaction ( possible irritation) results from derm patch testing     Sodium Metabisulfite Rash     Mild positive reaction from derm patch testing       Review of Systems:  -Constitutional: Otherwise doing well.   -Skin: As above in HPI. No additional skin concerns.    Physical exam:  Vitals: There were no  vitals taken for this visit.  GEN: Well developed, well-nourished, in no acute distress, in a pleasant mood.    SKIN: Focused examination of the scalp and face was performed.  - Atrophic skin, localized at vertex  - Diffuse erythema  - Telangiectasia  - In comparison to prior photographs (2/16/21):   - Left temporal: Improved   - Right temporal: Improved   - Vertex: Improved   - Occipital: Improved  Hair metrix significant for perifollicular and diffuse scale    Impression/Plan:  1.  Lichen planopilaris   - Continue: clobetasol shampoo twice a week   - Continue: clobetasol solution PRN  - Start: Photobiomodulation (PBM) with HairMax laser comb three times per week  - Send a photo of the current prescription shampoo you have been using over Epic  - Consider: pursuing PRP treatment in the future, on hold currently  - Because of the ongoing scalp inflammation, rec. Allergy evaluation - referral placed to Dr. Badillo     2. Bone spurs, left ankle  - Follow up with orthopedics     - Labs: N/A    Follow-up in 2 months, earlier for new or changing lesions.     Dr. Quinn staffed the patient.    Staff Involved:  Scribe/Fellow (Lanie Carmona)/Staff(as above)    Scribe Disclosure  I, Lanie Carmona, am serving as a scribe to document services personally performed by Dr. Rossana Quinn MD, based on data collection and the provider's statements to me.      Provider Disclosure:   The documentation recorded by the scribe accurately reflects the services I personally performed and the decisions made by me.    Rossana Quinn MD  Professor and Chair  Department of Dermatology  [facility]: Phone: [phone], Fax:[phone]  [facility]: Phone: [phone], Fax: [phone]

## 2021-06-23 NOTE — TELEPHONE ENCOUNTER
FUTURE VISIT INFORMATION      FUTURE VISIT INFORMATION:    Date: 9.21.21    Time: 1:00    [place] [facility] [place]:    Referring provider:  Dr. Quinn    Referring providers clinic:  [facility]    Reason for visit/diagnosis  Dermatitis, Patch Testing Consult, Per Pt    RECORDS REQUESTED FROM:       Clinic name Comments Records Status Photos Status   [facility] Derm 6.21.21  Dr. Quinn Epic Epic

## 2021-06-24 ENCOUNTER — TELEPHONE (OUTPATIENT)
Dept: DERMATOLOGY | Facility: CLINIC | Age: 66
End: 2021-06-24

## 2021-06-24 NOTE — TELEPHONE ENCOUNTER
[facility] Call Center    Phone Message    May a detailed message be left on voicemail: yes     Reason for Call: Medication Question or concern regarding medication   Prescription Clarification  Name of Medication:   Pt does not recall    Prescribing Provider:   Dr Quinn   Pharmacy:   [id number] - [place], [place] - [address] [place] [address]     What on the order needs clarification?   Pt states that Dr Quinn said she would send an Rx to pharmacy for Pt to use for hairloss. Pt does not recall name of Rx. Pt states that Rx has not been received by pharmacy.    Please send Rx to pharmacy as soon as possible.    Action Taken: Message routed to:  [facility] Derm    Travel Screening: Not Applicable

## 2021-06-24 NOTE — TELEPHONE ENCOUNTER
Left voicemail with patient that Dr. Quinn wanted her to send in a picture of her RX shampoo through [employer]. Asked her to call back if she had any other questions.

## 2021-09-21 ENCOUNTER — PRE VISIT (OUTPATIENT)
Dept: ALLERGY | Facility: CLINIC | Age: 66
End: 2021-09-21

## 2021-10-23 ENCOUNTER — HEALTH MAINTENANCE LETTER (OUTPATIENT)
Age: 66
End: 2021-10-23

## 2021-12-16 DIAGNOSIS — L30.9 DERMATITIS: ICD-10-CM

## 2021-12-16 DIAGNOSIS — L66.10 LICHEN PLANO-PILARIS: ICD-10-CM

## 2021-12-19 DIAGNOSIS — L66.10 LICHEN PLANO-PILARIS: ICD-10-CM

## 2021-12-19 DIAGNOSIS — L30.9 DERMATITIS: ICD-10-CM

## 2021-12-19 RX ORDER — CLOBETASOL PROPIONATE 0.5 MG/ML
SOLUTION TOPICAL
Qty: 50 ML | Refills: 0 | OUTPATIENT
Start: 2021-12-19

## 2021-12-19 RX ORDER — CLOBETASOL PROPIONATE 0.05 G/100ML
SHAMPOO TOPICAL
Qty: 118 ML | Refills: 4 | OUTPATIENT
Start: 2021-12-19

## 2021-12-19 NOTE — TELEPHONE ENCOUNTER
"   clobetasol (TEMOVATE) 0.05 % external solution   Last Written Prescription Date: 10/22/20  Last Fill Quantity: 50ml,   # refills: 0  Last Office Visit : 6/21/21  Future Office visit: none    Process 3    \" Follow-up in 2 months\"    Scheduling has been notified to contact the pt for appointment.    Refused.  "

## 2021-12-20 RX ORDER — CLOBETASOL PROPIONATE 0.5 MG/ML
SOLUTION TOPICAL
Qty: 50 ML | Refills: 0 | OUTPATIENT
Start: 2021-12-20

## 2021-12-31 ENCOUNTER — TELEPHONE (OUTPATIENT)
Dept: DERMATOLOGY | Facility: CLINIC | Age: 66
End: 2021-12-31

## 2021-12-31 NOTE — TELEPHONE ENCOUNTER
----- Message from Marta Marrero RN sent at 12/19/2021  7:31 AM CST -----  Pt  IMELDA AVENDANO [[id number]]    Please call to schedule.  Rossana Rodriguez MD  lv  6/21. Past due for   2 mth appt.      Thanks    I do not need any follow up on the scheduling of this appointment unless the patient will no longer be receiving care in our clinic.

## 2022-01-01 NOTE — TELEPHONE ENCOUNTER
Khadar 14 hours ago (3:50 PM)     RC       I spoke with the pt and the pt declined to schedule at this time. The [facility] would like  to know that she is doing ok for now.  Khadar Jain on 12/31/2021 at 3:47 PM         Documentation      Khadar Jain Nancy C [phone]  14 hours ago (3:47 PM)     RC    the pt declined to schedule follow.    Outgoing call

## 2022-02-03 DIAGNOSIS — L66.10 LICHEN PLANO-PILARIS: ICD-10-CM

## 2022-02-06 RX ORDER — CLOBETASOL PROPIONATE 0.05 G/100ML
SHAMPOO TOPICAL
Qty: 118 ML | Refills: 0 | Status: SHIPPED | OUTPATIENT
Start: 2022-02-06

## 2022-02-06 NOTE — TELEPHONE ENCOUNTER
"  clobetasol propionate (CLOBEX) 0.05 % external shampoo  Last Written Prescription Date:  10/13/20  Last Fill Quantity: 118ml,   # refills: 4  Last Office Visit : 6/21/21  Future Office visit: none     \"Follow-up in 2 months\"    Scheduling has been notified to contact the pt for appointment.    Process 3    \" - Continue: clobetasol shampoo twice a week         "

## 2022-02-07 ENCOUNTER — TELEPHONE (OUTPATIENT)
Dept: DERMATOLOGY | Facility: CLINIC | Age: 67
End: 2022-02-07
Payer: COMMERCIAL

## 2022-02-07 NOTE — TELEPHONE ENCOUNTER
Prior Authorization Retail Medication Request    Medication/Dose: clobetasol propionate (CLOBEX) 0.05 % external shampoo  ICD code (if different than what is on RX):  Lichen plano-pilaris [L66.1]  Previously Tried and Failed:    Rationale:      Insurance Name:  [employer] medicaid   BIN - [id number] [facility] [phone]  Not showing insurance on file - this is what came over on the reject notification

## 2022-02-08 NOTE — TELEPHONE ENCOUNTER
PA Initiation    Medication: clobetasol propionate (CLOBEX) 0.05 % external shampoo   Insurance Company: [employer] - Phone [phone] Fax [phone]  Pharmacy Filling the Rx: [id number] - [place], [place] - [address] [place] [address]  Filling Pharmacy Phone: [phone]  Filling Pharmacy Fax: [phone]  Start Date: 2/7/2022

## 2022-02-08 NOTE — TELEPHONE ENCOUNTER
Prior Authorization Approval    Authorization Effective Date: 1/8/2022  Authorization Expiration Date: 2/8/2023  Medication: clobetasol propionate (CLOBEX) 0.05 % external shampoo--APPROVED  Approved Dose/Quantity:   Reference #:     Insurance Company: [employer] - Phone [phone] Fax [phone]  Expected CoPay:       CoPay Card Available:      Foundation Assistance Needed:    Which Pharmacy is filling the prescription (Not needed for infusion/clinic administered): [id number] - [place], [place] - [address] [place] [address]  Pharmacy Notified: Yes  Patient Notified: Yes **Instructed pharmacy to notify patient when script is ready to /ship.**

## 2022-03-01 DIAGNOSIS — R23.8 PAPULE: Primary | ICD-10-CM

## 2022-03-01 RX ORDER — ADAPALENE 0.1 %
CREAM (GRAM) TOPICAL
COMMUNITY
Start: 2021-11-05 | End: 2022-03-01

## 2022-03-03 NOTE — TELEPHONE ENCOUNTER
" Centralized Medication Refill Team note:   DIFFERIN 0.1 % external cream  Last Written Prescription Date:  Historical entry  Last Fill Quantity: -,   # refills: -  Last Office Visit : 6/21/21  Future Office visit:  None/ 2 months    Routing refill request to clinic for review/approval because:   \"Prescription needs to be hard copy with hand signature faxed to [facility] pharmacy fax [phone] - please follow up\"        "

## 2022-03-14 NOTE — TELEPHONE ENCOUNTER
Routing to Dr Quinn to see if she would like refill for pt. Writer did not see medication mentioned in LOV.

## 2022-03-14 NOTE — TELEPHONE ENCOUNTER
Chitra does that she does need a new prescription, but was unaware that this was sent to Dr. Quinn.  Please disregard. Chitra is having this filled at her local dermatologist

## 2022-03-17 RX ORDER — ADAPALENE 0.1 %
CREAM (GRAM) TOPICAL
Qty: 45 G | Refills: 3 | Status: SHIPPED | OUTPATIENT
Start: 2022-03-17

## 2022-03-22 DIAGNOSIS — L66.10 LICHEN PLANO-PILARIS: Primary | ICD-10-CM

## 2022-03-23 ENCOUNTER — HOSPITAL ENCOUNTER (OUTPATIENT)
Facility: CLINIC | Age: 67
End: 2022-03-23
Attending: COLON & RECTAL SURGERY | Admitting: COLON & RECTAL SURGERY
Payer: COMMERCIAL

## 2022-03-23 DIAGNOSIS — Z11.59 ENCOUNTER FOR SCREENING FOR OTHER VIRAL DISEASES: Primary | ICD-10-CM

## 2022-03-24 RX ORDER — TRETINOIN 1 MG/G
GEL TOPICAL AT BEDTIME
Qty: 45 G | Refills: 11 | Status: SHIPPED | OUTPATIENT
Start: 2022-03-24

## 2022-03-24 NOTE — TELEPHONE ENCOUNTER
Received refill request for tretinoin as the resident on call.   Reviewed patient's chart and attached communication.   After reviewing the medication list and assessment and plan from last visit, the refill request was approved.    CC'ing Dr. Quinn as SHELBIE.    [place] MD  PGY-2 Dermatology

## 2022-03-24 NOTE — TELEPHONE ENCOUNTER
" tretinoin microspheres (RETIN-A MICRO) 0.1 % external gel  Last Written Prescription Date:  Not noted  Last Fill Quantity: na,   # refills: na   Last Office Visit : 6/21/21  Future Office visit:  None/ 2 months       Routing refill request to provider team for review/approval because:  Drug not active on patient's medication list/  Not seen in Dermatology notes     \"Prescription needs to be hard copy with hand signature faxed to [facility] pharmacy fax [phone] - please follow up\"      "

## 2022-03-25 RX ORDER — LIDOCAINE 40 MG/G
CREAM TOPICAL
Status: CANCELLED | OUTPATIENT
Start: 2022-03-25

## 2022-03-25 RX ORDER — ONDANSETRON 2 MG/ML
4 INJECTION INTRAMUSCULAR; INTRAVENOUS
Status: CANCELLED | OUTPATIENT
Start: 2022-03-25

## 2022-05-27 ENCOUNTER — HOSPITAL ENCOUNTER (OUTPATIENT)
Dept: MAMMOGRAPHY | Facility: CLINIC | Age: 67
Discharge: HOME | End: 2022-05-27
Attending: FAMILY MEDICINE | Admitting: FAMILY MEDICINE
Payer: COMMERCIAL

## 2022-05-27 DIAGNOSIS — Z12.31 VISIT FOR SCREENING MAMMOGRAM: ICD-10-CM

## 2022-05-27 PROCEDURE — 77067 SCR MAMMO BI INCL CAD: CPT

## 2022-06-04 ENCOUNTER — HEALTH MAINTENANCE LETTER (OUTPATIENT)
Age: 67
End: 2022-06-04

## 2022-07-05 ENCOUNTER — MEDICAL CORRESPONDENCE (OUTPATIENT)
Dept: MAMMOGRAPHY | Facility: CLINIC | Age: 67
End: 2022-07-05

## 2022-10-10 ENCOUNTER — HEALTH MAINTENANCE LETTER (OUTPATIENT)
Age: 67
End: 2022-10-10

## 2022-12-08 DIAGNOSIS — L66.10 LICHEN PLANO-PILARIS: ICD-10-CM

## 2022-12-12 RX ORDER — CLOBETASOL PROPIONATE 0.05 G/100ML
SHAMPOO TOPICAL
Qty: 118 ML | Refills: 0 | OUTPATIENT
Start: 2022-12-12

## 2022-12-12 NOTE — TELEPHONE ENCOUNTER
Clobetasol Propionate External Shampoo 0.05 %   Last Written Prescription Date:  2/6/2022  Last Fill Quantity: 118,   # refills: 0  Last Office Visit :  6/21/2021  Future Office visit:  None    Routing refill request to provider for review/approval because:  Second request   Needing updated office visit  Med refused and scheduling notified     Jazmin Hobbs RN  Central Triage Red Flags/Med Refills

## 2022-12-28 ENCOUNTER — TELEPHONE (OUTPATIENT)
Dept: DERMATOLOGY | Facility: CLINIC | Age: 67
End: 2022-12-28

## 2023-08-20 ENCOUNTER — HEALTH MAINTENANCE LETTER (OUTPATIENT)
Age: 68
End: 2023-08-20

## 2023-09-26 ENCOUNTER — OFFICE VISIT (OUTPATIENT)
Dept: DERMATOLOGY | Facility: CLINIC | Age: 68
End: 2023-09-26
Payer: COMMERCIAL

## 2023-09-26 DIAGNOSIS — L71.9 ROSACEA: Primary | ICD-10-CM

## 2023-09-26 DIAGNOSIS — L66.10 LICHEN PLANOPILARIS: ICD-10-CM

## 2023-09-26 PROCEDURE — 99214 OFFICE O/P EST MOD 30 MIN: CPT | Mod: GC | Performed by: DERMATOLOGY

## 2023-09-26 RX ORDER — DUTASTERIDE 0.5 MG/1
CAPSULE, LIQUID FILLED ORAL
COMMUNITY
Start: 2023-09-18

## 2023-09-26 ASSESSMENT — PAIN SCALES - GENERAL: PAINLEVEL: NO PAIN (0)

## 2023-09-26 NOTE — PROGRESS NOTES
[facility] Dermatology Note  Encounter Date: Sep 26, 2023  Office Visit     Dermatology Problem List:  1.  Lichen planopilaris   - Current tx: Unknown prescription shampoo twice a week and clobetasol solution PRN (preference for clobex shampoo as hair metrics shows telangiectasias), dutasteride  - Considering pursuing PRP treatment, on hold currently  - s/p ILK 2 ml of 10 mg/cc on 2/16/21  - Past tx: plaquenil, LLLT, Benadryl and Allegra for pruritus, fluocinolone oil, gabapentin, ketoconazole shampoo (patient disliked product), oral gabapentin  - Hair[employer]rix: 11/30/20, 6/21/21      2. ACD: patch testing 8/9/2017, referral for a reevaluation prev placed as she persists with scalp inflammation  - mild positive reaction to nickel, linalool, methylisothiazolinone, sodium metabisulfite, methyldibromoglutaronitrile/phenoxyethanol, benzisothiazolinone, disperse dye mix, gold, p-methylaminophenol sulphate (Metol), and iodine.  - It was determined that many of her products contained these ingredients and recommended that she use products off of the [employer] safe list ([id number] and [id number])  - printed out new [employer] list on 2/16/21      3. Bone spurs, left ankle     4. Stasis dermatitis, resolved  - Past tx: triamcinolone 0.1% external cream BID PRN    ____________________________________________    Assessment & Plan:     # LPP. Overall stable. Co-managed with Dr. Emigdio Anthony. Currently using clobetasol solution 1-2 times weekly and dutasteride (from Dr. Emigdio Anthony). Offered adding in minoxidil but declines due to facial hair growth concerns. Does not desire ILK or further topicals. Will keep plan as below.  - clobetasol solution 1-2 times weekly  - continue dutasteride per Dr. Emigdio Anthony    # Acne rosacea vs acne vulgaris. Question if related to topical steroids vs rosacea.   - start metronidazole cream daily  - if becomes too bothersome, can switch from clobetasol to topical Christopher inhibitor or  tacrolimus     Procedures Performed:   none    Follow-up: 6 month(s) in-person, or earlier for new or changing lesions    Staff and Resident:    I, Beck Bullard MD, discussed and evaluated the patient with Dr. Tucker.    Staff Physician Comments:   I saw and evaluated the patient with the resident and I agree with the assessment and plan.  I was present for the examination.    Oliva Tucker MD    Department of Dermatology  [facility]: Phone: [phone], Fax: [phone]  10/3/2023     ____________________________________________    CC: Derm Problem (Follow up lichen planopilaris, can't tell if its better or worse. Red spots on face. )    HPI:  Ms. Chitra Monet is a(n) 68 year old female who presents today as a return patient for hair loss. Previously had seen Dr. Quinn.    Saw Dr. Emigdio Anthony who started her on dutasteride recently. Thinks hair loss is fairly stable, no hair loss elsewhere. Itch is well controlled with clobetasol solution 1-2 times weekly.    Patient is otherwise feeling well, without additional skin concerns.    Labs Reviewed:  N/A    Physical Exam:  Vitals: There were no vitals taken for this visit.  SKIN: Focused examination of scalp and face was performed.  - Multiple erythematous papules and pustules of face  - Focused at vertex scalp there is a scarred to atrophic plaque with hair growth evident along with perifollicular scale and erythema  - No hair loss of eyebrows or eyelashes  - No other lesions of concern on areas examined.     Medications:  Current Outpatient Medications   Medication    albuterol (ALBUTEROL) 108 (90 BASE) MCG/ACT inhaler    BuPROPion HCl (WELLBUTRIN PO)    clobetasol (TEMOVATE) 0.05 % external solution    clobetasol propionate (CLOBEX) 0.05 % external shampoo    DIFFERIN 0.1 % external cream    fexofenadine (ALLEGRA ALLERGY) 180 MG tablet    Fluocinolone Acetonide  Scalp 0.01 % OIL oil    Fluocinolone Acetonide Scalp 0.01 % OIL oil    gabapentin (NEURONTIN) 300 MG capsule    gabapentin 6 % SOLN solution    hydroxychloroquine (PLAQUENIL) 200 MG tablet    hydroxychloroquine (PLAQUENIL) 200 MG tablet    ketoconazole (NIZORAL) 2 % external shampoo    levothyroxine (SYNTHROID/LEVOTHROID) 125 MCG tablet    Multiple Vitamin (MULTIVITAMINS PO)    NONFORMULARY    NONFORMULARY    NONFORMULARY    NONFORMULARY    SUMAtriptan Succinate (IMITREX PO)    tacrolimus (PROTOPIC) 0.1 % ointment    Thyroid (NATURE-THROID) 81.25 MG TABS    tretinoin microspheres (RETIN-A MICRO) 0.1 % external gel    triamcinolone (KENALOG) 0.1 % external cream     Current Facility-Administered Medications   Medication    triamcinolone acetonide (KENALOG-10) injection 14 mg    triamcinolone acetonide (KENALOG-10) injection 30 mg      Past Medical History:   Patient Active Problem List   Diagnosis    Lichen plano-pilaris    Dermatitis    Pruritus    Allergic dermatitis due to other chemical product     Past Medical History:   Diagnosis Date    Constipation     CHRONIC    Cough     CHRONIC    Migraine, unspecified, without mention of intractable migraine without mention of status migrainosus     Unspecified hypothyroidism        CC Rossana Quinn MD  [address] [facility] [address]  [place] [address] on close of this encounter.

## 2023-09-26 NOTE — PATIENT INSTRUCTIONS
Continue taking dutasteride daily    Keep using clobetasol solution 1-2 times weekly for itch    Start using metronidazole cream daily to face to decrease acne bumps. Due to either rosacea, perioral dermatitis or side effect from steroid.    In future we can add on topical anti-inflammatory creams or solution as needed if clobetasol is not helping or itch worsens

## 2023-09-26 NOTE — NURSING NOTE
Dermatology Rooming Note    Chitra Monet's goals for this visit include:   Chief Complaint   Patient presents with    Derm Problem     Follow up lichen planopilaris, can't tell if its better or worse. Red spots on face.      Justina Mathew RN

## 2023-09-26 NOTE — LETTER
9/26/2023       RE: Chitra Monet  [address]  [facility] [address]     Dear Colleague,    Thank you for referring your patient, Chitra Monet, to the [facility] [place] [facility]. Please see a copy of my visit note below.    [facility] Dermatology Note  Encounter Date: Sep 26, 2023  Office Visit     Dermatology Problem List:  1.  Lichen planopilaris   - Current tx: Unknown prescription shampoo twice a week and clobetasol solution PRN (preference for clobex shampoo as hair metrics shows telangiectasias), dutasteride  - Considering pursuing PRP treatment, on hold currently  - s/p ILK 2 ml of 10 mg/cc on 2/16/21  - Past tx: plaquenil, LLLT, Benadryl and Allegra for pruritus, fluocinolone oil, gabapentin, ketoconazole shampoo (patient disliked product), oral gabapentin  - HairMetrix: 11/30/20, 6/21/21      2. ACD: patch testing 8/9/2017, referral for a reevaluation prev placed as she persists with scalp inflammation  - mild positive reaction to nickel, linalool, methylisothiazolinone, sodium metabisulfite, methyldibromoglutaronitrile/phenoxyethanol, benzisothiazolinone, disperse dye mix, gold, p-methylaminophenol sulphate (Metol), and iodine.  - It was determined that many of her products contained these ingredients and recommended that she use products off of the [employer] safe list ([id number] and [id number])  - printed out new [employer] list on 2/16/21      3. Bone spurs, left ankle     4. Stasis dermatitis, resolved  - Past tx: triamcinolone 0.1% external cream BID PRN    ____________________________________________    Assessment & Plan:     # LPP. Overall stable. Co-managed with Dr. Emigdio Anthony. Currently using clobetasol solution 1-2 times weekly and dutasteride (from Dr. Emigdio Anthony). Offered adding in minoxidil but declines due to facial hair growth concerns. Does not desire ILK or further  topicals. Will keep plan as below.  - clobetasol solution 1-2 times weekly  - continue dutasteride per Dr. Emigdio Anthony    # Acne rosacea vs acne vulgaris. Question if related to topical steroids vs rosacea.   - start metronidazole cream daily  - if becomes too bothersome, can switch from clobetasol to topical Christopher inhibitor or tacrolimus     Procedures Performed:   none    Follow-up: 6 month(s) in-person, or earlier for new or changing lesions    Staff and Resident:    I, Beck Bullard MD, discussed and evaluated the patient with Dr. Tucker.    Staff Physician Comments:   I saw and evaluated the patient with the resident and I agree with the assessment and plan.  I was present for the examination.    Oliva Tucker MD    Department of Dermatology  [facility]: Phone: [phone], Fax: [phone]  10/3/2023     ____________________________________________    CC: Derm Problem (Follow up lichen planopilaris, can't tell if its better or worse. Red spots on face. )    HPI:  Ms. Chitra Monet is a(n) 68 year old female who presents today as a return patient for hair loss. Previously had seen Dr. Quinn.    Saw Dr. Emigdio Anthony who started her on dutasteride recently. Thinks hair loss is fairly stable, no hair loss elsewhere. Itch is well controlled with clobetasol solution 1-2 times weekly.    Patient is otherwise feeling well, without additional skin concerns.    Labs Reviewed:  N/A    Physical Exam:  Vitals: There were no vitals taken for this visit.  SKIN: Focused examination of scalp and face was performed.  - Multiple erythematous papules and pustules of face  - Focused at vertex scalp there is a scarred to atrophic plaque with hair growth evident along with perifollicular scale and erythema  - No hair loss of eyebrows or eyelashes  - No other lesions of concern on areas examined.     Medications:  Current  Outpatient Medications   Medication    albuterol (ALBUTEROL) 108 (90 BASE) MCG/ACT inhaler    BuPROPion HCl (WELLBUTRIN PO)    clobetasol (TEMOVATE) 0.05 % external solution    clobetasol propionate (CLOBEX) 0.05 % external shampoo    DIFFERIN 0.1 % external cream    fexofenadine (ALLEGRA ALLERGY) 180 MG tablet    Fluocinolone Acetonide Scalp 0.01 % OIL oil    Fluocinolone Acetonide Scalp 0.01 % OIL oil    gabapentin (NEURONTIN) 300 MG capsule    gabapentin 6 % SOLN solution    hydroxychloroquine (PLAQUENIL) 200 MG tablet    hydroxychloroquine (PLAQUENIL) 200 MG tablet    ketoconazole (NIZORAL) 2 % external shampoo    levothyroxine (SYNTHROID/LEVOTHROID) 125 MCG tablet    Multiple Vitamin (MULTIVITAMINS PO)    NONFORMULARY    NONFORMULARY    NONFORMULARY    NONFORMULARY    SUMAtriptan Succinate (IMITREX PO)    tacrolimus (PROTOPIC) 0.1 % ointment    Thyroid (NATURE-THROID) 81.25 MG TABS    tretinoin microspheres (RETIN-A MICRO) 0.1 % external gel    triamcinolone (KENALOG) 0.1 % external cream     Current Facility-Administered Medications   Medication    triamcinolone acetonide (KENALOG-10) injection 14 mg    triamcinolone acetonide (KENALOG-10) injection 30 mg      Past Medical History:   Patient Active Problem List   Diagnosis    Lichen plano-pilaris    Dermatitis    Pruritus    Allergic dermatitis due to other chemical product     Past Medical History:   Diagnosis Date    Constipation     CHRONIC    Cough     CHRONIC    Migraine, unspecified, without mention of intractable migraine without mention of status migrainosus     Unspecified hypothyroidism        CC Rossana Quinn MD  [address] [facility] [address]  [place] [address] on close of this encounter.

## 2023-10-19 NOTE — TELEPHONE ENCOUNTER
patient needs to be seen within 3 months for refill. Derm process # 3.   Mucosal Advancement Flap Text: Given the location of the defect, shape of the defect and the proximity to free margins a mucosal advancement flap was deemed most appropriate. Incisions were made with a 15 blade scalpel in the appropriate fashion along the cutaneous vermilion border and the mucosal lip. The remaining actinically damaged mucosal tissue was excised.  The mucosal advancement flap was then elevated to the gingival sulcus with care taken to preserve the neurovascular structures and advanced into the primary defect. Care was taken to ensure that precise realignment of the vermilion border was achieved.

## 2024-08-04 ENCOUNTER — HEALTH MAINTENANCE LETTER (OUTPATIENT)
Age: 69
End: 2024-08-04

## 2024-10-13 ENCOUNTER — HEALTH MAINTENANCE LETTER (OUTPATIENT)
Age: 69
End: 2024-10-13

## 2025-06-10 ENCOUNTER — HOSPITAL ENCOUNTER (OUTPATIENT)
Dept: MAMMOGRAPHY | Facility: CLINIC | Age: 70
Discharge: HOME | End: 2025-06-10
Attending: FAMILY MEDICINE
Payer: COMMERCIAL

## 2025-06-10 DIAGNOSIS — N64.4 BREAST PAIN: ICD-10-CM

## 2025-06-10 PROCEDURE — 77062 BREAST TOMOSYNTHESIS BI: CPT
